# Patient Record
Sex: MALE | Race: WHITE | NOT HISPANIC OR LATINO | Employment: FULL TIME | URBAN - METROPOLITAN AREA
[De-identification: names, ages, dates, MRNs, and addresses within clinical notes are randomized per-mention and may not be internally consistent; named-entity substitution may affect disease eponyms.]

---

## 2017-03-24 ENCOUNTER — ANESTHESIA (OUTPATIENT)
Dept: SURGERY | Facility: HOSPITAL | Age: 52
End: 2017-03-24
Payer: COMMERCIAL

## 2017-03-24 ENCOUNTER — ANESTHESIA EVENT (OUTPATIENT)
Dept: SURGERY | Facility: HOSPITAL | Age: 52
End: 2017-03-24
Payer: COMMERCIAL

## 2017-03-24 ENCOUNTER — HOSPITAL ENCOUNTER (OUTPATIENT)
Facility: HOSPITAL | Age: 52
Discharge: HOME | End: 2017-03-24
Attending: ORTHOPAEDIC SURGERY | Admitting: ORTHOPAEDIC SURGERY
Payer: COMMERCIAL

## 2017-03-24 ENCOUNTER — SURGERY (OUTPATIENT)
Age: 52
End: 2017-03-24

## 2017-03-24 VITALS
SYSTOLIC BLOOD PRESSURE: 143 MMHG | OXYGEN SATURATION: 97 % | WEIGHT: 160 LBS | HEIGHT: 71 IN | DIASTOLIC BLOOD PRESSURE: 88 MMHG | TEMPERATURE: 98 F | BODY MASS INDEX: 22.4 KG/M2 | HEART RATE: 88 BPM | RESPIRATION RATE: 16 BRPM

## 2017-03-24 DIAGNOSIS — S62.102A LEFT WRIST FRACTURE: ICD-10-CM

## 2017-03-24 PROCEDURE — C1769 GUIDE WIRE: HCPCS | Performed by: ORTHOPAEDIC SURGERY

## 2017-03-24 PROCEDURE — 25000003 PHARM REV CODE 250: Performed by: NURSE ANESTHETIST, CERTIFIED REGISTERED

## 2017-03-24 PROCEDURE — C1713 ANCHOR/SCREW BN/BN,TIS/BN: HCPCS | Performed by: ORTHOPAEDIC SURGERY

## 2017-03-24 PROCEDURE — 71000015 HC POSTOP RECOV 1ST HR: Performed by: ORTHOPAEDIC SURGERY

## 2017-03-24 PROCEDURE — 36000709 HC OR TIME LEV III EA ADD 15 MIN: Performed by: ORTHOPAEDIC SURGERY

## 2017-03-24 PROCEDURE — 71000033 HC RECOVERY, INTIAL HOUR: Performed by: ORTHOPAEDIC SURGERY

## 2017-03-24 PROCEDURE — 25000003 PHARM REV CODE 250: Performed by: ANESTHESIOLOGY

## 2017-03-24 PROCEDURE — 27201423 OPTIME MED/SURG SUP & DEVICES STERILE SUPPLY: Performed by: ORTHOPAEDIC SURGERY

## 2017-03-24 PROCEDURE — 36000708 HC OR TIME LEV III 1ST 15 MIN: Performed by: ORTHOPAEDIC SURGERY

## 2017-03-24 PROCEDURE — 63600175 PHARM REV CODE 636 W HCPCS: Performed by: NURSE ANESTHETIST, CERTIFIED REGISTERED

## 2017-03-24 PROCEDURE — D9220A PRA ANESTHESIA: Mod: ANES,,, | Performed by: ANESTHESIOLOGY

## 2017-03-24 PROCEDURE — 37000009 HC ANESTHESIA EA ADD 15 MINS: Performed by: ORTHOPAEDIC SURGERY

## 2017-03-24 PROCEDURE — 99900104 DSU ONLY-NO CHARGE-EA ADD'L HR (STAT): Performed by: ORTHOPAEDIC SURGERY

## 2017-03-24 PROCEDURE — D9220A PRA ANESTHESIA: Mod: CRNA,,, | Performed by: NURSE ANESTHETIST, CERTIFIED REGISTERED

## 2017-03-24 PROCEDURE — 25000003 PHARM REV CODE 250

## 2017-03-24 PROCEDURE — 63600175 PHARM REV CODE 636 W HCPCS: Performed by: ORTHOPAEDIC SURGERY

## 2017-03-24 PROCEDURE — 99900103 DSU ONLY-NO CHARGE-INITIAL HR (STAT): Performed by: ORTHOPAEDIC SURGERY

## 2017-03-24 PROCEDURE — 71000039 HC RECOVERY, EACH ADD'L HOUR: Performed by: ORTHOPAEDIC SURGERY

## 2017-03-24 PROCEDURE — 63600175 PHARM REV CODE 636 W HCPCS: Performed by: ANESTHESIOLOGY

## 2017-03-24 PROCEDURE — 37000008 HC ANESTHESIA 1ST 15 MINUTES: Performed by: ORTHOPAEDIC SURGERY

## 2017-03-24 PROCEDURE — 25000003 PHARM REV CODE 250: Performed by: ORTHOPAEDIC SURGERY

## 2017-03-24 PROCEDURE — 27200651 HC AIRWAY, LMA: Performed by: NURSE ANESTHETIST, CERTIFIED REGISTERED

## 2017-03-24 DEVICE — SCREW LOCKING 2.4 X 20MM: Type: IMPLANTABLE DEVICE | Site: WRIST | Status: FUNCTIONAL

## 2017-03-24 DEVICE — IMPLANTABLE DEVICE: Type: IMPLANTABLE DEVICE | Site: WRIST | Status: FUNCTIONAL

## 2017-03-24 DEVICE — SCREW BONE LOCKING 2.4X24MM: Type: IMPLANTABLE DEVICE | Site: WRIST | Status: FUNCTIONAL

## 2017-03-24 DEVICE — SCREW LOCKING 2.4 X 14MM: Type: IMPLANTABLE DEVICE | Site: WRIST | Status: FUNCTIONAL

## 2017-03-24 DEVICE — SCREW CORTEX 2.7 X 16MM: Type: IMPLANTABLE DEVICE | Site: WRIST | Status: FUNCTIONAL

## 2017-03-24 DEVICE — SCREW STRDRV REC T8 2.4X24 SS: Type: IMPLANTABLE DEVICE | Site: WRIST | Status: FUNCTIONAL

## 2017-03-24 RX ORDER — MIDAZOLAM HYDROCHLORIDE 1 MG/ML
INJECTION, SOLUTION INTRAMUSCULAR; INTRAVENOUS
Status: DISCONTINUED | OUTPATIENT
Start: 2017-03-24 | End: 2017-03-24

## 2017-03-24 RX ORDER — CEFAZOLIN SODIUM 2 G/50ML
2 SOLUTION INTRAVENOUS
Status: COMPLETED | OUTPATIENT
Start: 2017-03-24 | End: 2017-03-24

## 2017-03-24 RX ORDER — KETAMINE HYDROCHLORIDE 100 MG/ML
INJECTION, SOLUTION INTRAMUSCULAR; INTRAVENOUS
Status: COMPLETED
Start: 2017-03-24 | End: 2017-03-24

## 2017-03-24 RX ORDER — HYDROCODONE BITARTRATE AND ACETAMINOPHEN 5; 325 MG/1; MG/1
1 TABLET ORAL EVERY 6 HOURS PRN
Status: ON HOLD | COMMUNITY
End: 2017-03-24 | Stop reason: HOSPADM

## 2017-03-24 RX ORDER — SODIUM CHLORIDE 0.9 % (FLUSH) 0.9 %
3 SYRINGE (ML) INJECTION
Status: DISCONTINUED | OUTPATIENT
Start: 2017-03-24 | End: 2017-03-24 | Stop reason: HOSPADM

## 2017-03-24 RX ORDER — PROPOFOL 10 MG/ML
VIAL (ML) INTRAVENOUS
Status: DISCONTINUED | OUTPATIENT
Start: 2017-03-24 | End: 2017-03-24

## 2017-03-24 RX ORDER — FENTANYL CITRATE 50 UG/ML
INJECTION, SOLUTION INTRAMUSCULAR; INTRAVENOUS
Status: DISCONTINUED | OUTPATIENT
Start: 2017-03-24 | End: 2017-03-24

## 2017-03-24 RX ORDER — FENTANYL CITRATE 50 UG/ML
25 INJECTION, SOLUTION INTRAMUSCULAR; INTRAVENOUS EVERY 5 MIN PRN
Status: COMPLETED | OUTPATIENT
Start: 2017-03-24 | End: 2017-03-24

## 2017-03-24 RX ORDER — KETOROLAC TROMETHAMINE 30 MG/ML
INJECTION, SOLUTION INTRAMUSCULAR; INTRAVENOUS
Status: DISCONTINUED | OUTPATIENT
Start: 2017-03-24 | End: 2017-03-24

## 2017-03-24 RX ORDER — SODIUM CHLORIDE, SODIUM LACTATE, POTASSIUM CHLORIDE, CALCIUM CHLORIDE 600; 310; 30; 20 MG/100ML; MG/100ML; MG/100ML; MG/100ML
INJECTION, SOLUTION INTRAVENOUS CONTINUOUS
Status: DISCONTINUED | OUTPATIENT
Start: 2017-03-24 | End: 2017-03-24 | Stop reason: HOSPADM

## 2017-03-24 RX ORDER — OXYCODONE AND ACETAMINOPHEN 5; 325 MG/1; MG/1
1-2 TABLET ORAL EVERY 6 HOURS PRN
Qty: 91 TABLET | Refills: 0 | Status: SHIPPED | OUTPATIENT
Start: 2017-03-24 | End: 2017-10-16

## 2017-03-24 RX ORDER — OXYCODONE AND ACETAMINOPHEN 5; 325 MG/1; MG/1
1 TABLET ORAL ONCE
Status: COMPLETED | OUTPATIENT
Start: 2017-03-24 | End: 2017-03-24

## 2017-03-24 RX ORDER — ONDANSETRON HYDROCHLORIDE 2 MG/ML
INJECTION, SOLUTION INTRAMUSCULAR; INTRAVENOUS
Status: DISCONTINUED | OUTPATIENT
Start: 2017-03-24 | End: 2017-03-24

## 2017-03-24 RX ORDER — LIDOCAINE HYDROCHLORIDE 10 MG/ML
1 INJECTION, SOLUTION EPIDURAL; INFILTRATION; INTRACAUDAL; PERINEURAL ONCE
Status: COMPLETED | OUTPATIENT
Start: 2017-03-24 | End: 2017-03-24

## 2017-03-24 RX ORDER — DEXAMETHASONE SODIUM PHOSPHATE 4 MG/ML
INJECTION, SOLUTION INTRA-ARTICULAR; INTRALESIONAL; INTRAMUSCULAR; INTRAVENOUS; SOFT TISSUE
Status: DISCONTINUED | OUTPATIENT
Start: 2017-03-24 | End: 2017-03-24

## 2017-03-24 RX ORDER — LIDOCAINE HCL/PF 100 MG/5ML
SYRINGE (ML) INTRAVENOUS
Status: DISCONTINUED | OUTPATIENT
Start: 2017-03-24 | End: 2017-03-24

## 2017-03-24 RX ORDER — CEFAZOLIN SODIUM 2 G/50ML
SOLUTION INTRAVENOUS
Status: DISCONTINUED
Start: 2017-03-24 | End: 2017-03-24 | Stop reason: HOSPADM

## 2017-03-24 RX ADMIN — MIDAZOLAM 2 MG: 1 INJECTION INTRAMUSCULAR; INTRAVENOUS at 02:03

## 2017-03-24 RX ADMIN — KETOROLAC TROMETHAMINE 30 MG: 30 INJECTION, SOLUTION INTRAMUSCULAR; INTRAVENOUS at 03:03

## 2017-03-24 RX ADMIN — SODIUM CHLORIDE, SODIUM LACTATE, POTASSIUM CHLORIDE, AND CALCIUM CHLORIDE: .6; .31; .03; .02 INJECTION, SOLUTION INTRAVENOUS at 02:03

## 2017-03-24 RX ADMIN — PROPOFOL 200 MG: 10 INJECTION, EMULSION INTRAVENOUS at 02:03

## 2017-03-24 RX ADMIN — OXYCODONE HYDROCHLORIDE AND ACETAMINOPHEN 1 TABLET: 5; 325 TABLET ORAL at 04:03

## 2017-03-24 RX ADMIN — KETAMINE HYDROCHLORIDE 25 MG: 100 INJECTION, SOLUTION, CONCENTRATE INTRAMUSCULAR; INTRAVENOUS at 02:03

## 2017-03-24 RX ADMIN — ONDANSETRON 4 MG: 2 INJECTION, SOLUTION INTRAMUSCULAR; INTRAVENOUS at 02:03

## 2017-03-24 RX ADMIN — FENTANYL CITRATE 100 MCG: 50 INJECTION INTRAMUSCULAR; INTRAVENOUS at 02:03

## 2017-03-24 RX ADMIN — FENTANYL CITRATE 25 MCG: 50 INJECTION INTRAMUSCULAR; INTRAVENOUS at 04:03

## 2017-03-24 RX ADMIN — FENTANYL CITRATE 50 MCG: 50 INJECTION INTRAMUSCULAR; INTRAVENOUS at 03:03

## 2017-03-24 RX ADMIN — LIDOCAINE HYDROCHLORIDE 10 MG: 10 INJECTION, SOLUTION EPIDURAL; INFILTRATION; INTRACAUDAL; PERINEURAL at 02:03

## 2017-03-24 RX ADMIN — DEXAMETHASONE SODIUM PHOSPHATE 4 MG: 4 INJECTION, SOLUTION INTRAMUSCULAR; INTRAVENOUS at 02:03

## 2017-03-24 RX ADMIN — LIDOCAINE HYDROCHLORIDE 100 MG: 20 INJECTION, SOLUTION INTRAVENOUS at 02:03

## 2017-03-24 RX ADMIN — CEFAZOLIN SODIUM 2 G: 2 SOLUTION INTRAVENOUS at 02:03

## 2017-03-24 NOTE — PLAN OF CARE
Informed Dr. Jiménez that patient has a rash in between fingers on left hand.  No new orders given.  Cassy Davis

## 2017-03-24 NOTE — ANESTHESIA PREPROCEDURE EVALUATION
03/24/2017  Anatoly Hatch III is a 51 y.o., male.    [facility] Anesthesia Evaluation    I have reviewed the Patient Summary Reports.    I have reviewed the Nursing Notes.   I have reviewed the Medications.     Review of Systems  Anesthesia Hx:  No previous Anesthesia  Denies Family Hx of Anesthesia complications.    Social:  Smoker, Alcohol Use    Hematology/Oncology:  Hematology Normal   Oncology Normal     EENT/Dental:EENT/Dental Normal   Cardiovascular:  Cardiovascular Normal     Pulmonary:  Pulmonary Normal Chronic smokers cough   Renal/:  Renal/ Normal     Hepatic/GI:  Hepatic/GI Normal    Neurological:  Neurology Normal    Endocrine:  Endocrine Normal        Physical Exam  General:  Well nourished    Airway/Jaw/Neck:  Airway Findings: Mouth Opening: Normal Tongue: Normal  General Airway Assessment: Adult  Mallampati: II  TM Distance: Normal, at least 6 cm       Chest/Lungs:  Chest/Lungs Findings: Rhonchi     Heart/Vascular:  Heart Findings: Rate: Normal  Rhythm: Regular Rhythm             Anesthesia Plan  Type of Anesthesia, risks & benefits discussed:  Anesthesia Type:  general  Patient's Preference:   Intra-op Monitoring Plan:   Intra-op Monitoring Plan Comments:   Post Op Pain Control Plan:   Post Op Pain Control Plan Comments:   Induction:   IV  Beta Blocker:  Patient is not currently on a Beta-Blocker (No further documentation required).       Informed Consent: Patient understands risks and agrees with Anesthesia plan.  Questions answered. Anesthesia consent signed with patient.  ASA Score: 2     Day of Surgery Review of History & Physical:    H&P update referred to the surgeon.         Ready For Surgery From Anesthesia Perspective.

## 2017-03-24 NOTE — OR NURSING
Informed Dr. Temple that patient drank coffee with cream and sugar at 0900 this morning, and that there isn't any current labs or EKG in chart.  No new orders given.  Cassy Davis

## 2017-03-24 NOTE — H&P
"Orthopaedic Surgery History and Physical    History of present illness:   Anatoly Hatch III is a 51 y.o. male who presents with LEFT wrist pain    Allergies:   Review of patient's allergies indicates:  No Known Allergies    Past medical history:   History reviewed. No pertinent past medical history.    Past surgical history:  History reviewed. No pertinent surgical history.    Social history:   Reviewed per EPIC history for tobacco or alcohol use     Medications:    Current Facility-Administered Medications:     cefazolin (ANCEF) 2 gram in dextrose 5% 50 mL IVPB (premix), 2 g, Intravenous, Once Pre-Op, Mika Jiémnez MD    lactated ringers infusion, , Intravenous, Continuous, Meir Temple MD, Last Rate: 10 mL/hr at 03/24/17 1422    sodium chloride 0.9% flush 3 mL, 3 mL, Intravenous, PRN, Meir Temple MD    Review of systems:  Denies chest pain, palpitations, shortness of breath.   Denies excessive thirst, urination or heat or cold intolerance.   Denies nausea, vomiting, melena or hematochezia.    Denies fever, chills, night sweats, weight loss.    Denies dysuria or hematuria.   Denies history of anxiety or depression.   Denies any skin abnormalities or rash.   Denies upper or lower extremity paresthesias or lightheadedness.    Denies cough, shortness of breath or hemoptysis.     Physical Exam:   Vitals:    03/24/17 0953 03/24/17 1425   BP:  (!) 143/89   BP Location:  Right arm   Patient Position:  Lying   BP Method:  Automatic   Pulse:  95   Resp:  18   Temp:  98.1 °F (36.7 °C)   TempSrc:  Oral   Weight: 72.6 kg (160 lb) 72.6 kg (160 lb)   Height: 5' 11" (1.803 m) 5' 11" (1.803 m)     No results for input(s): GLUCOSE, CALCIUM, PROT, NA, K, CO2, CL, BUN, CREATININE in the last 72 hours.    Invalid input(s):  ALBUMIN  No results for input(s): WBC, RBC, HGB, HCT, PLT in the last 72 hours.  No results for input(s): INR, APTT in the last 72 hours.    Invalid input(s): PT    Awake/alert/oriented " x3, No acute distress, Afebrile, Vital signs stable  Normocephalic, Atraumatic  Heart beating at normal rate  Good inspiratory effort with unlaboured breathing  Abdomen soft/nondistended/nontender    Imaging:  Radiographs demonstrate LEFT distal radius fracture    Assessment:   51 y.o. male with LEFT wrist fx      Plan:   To or    Mika Jiménez MD

## 2017-03-24 NOTE — PLAN OF CARE
Discharge instructions given to pt and girlfriend, who voice understanding.  Taking po fluids without nausea.  Pain 5/10 and tolerable per pt.  Dressing/soft cast to LUE, dry and intact

## 2017-03-24 NOTE — TRANSFER OF CARE
"Anesthesia Transfer of Care Note    Patient: Anatoly Hatch III    Procedure(s) Performed: Procedure(s) (LRB):  OPEN REDUCTION INTERNAL FIXATION-WRIST (Left)    Patient location: PACU    Anesthesia Type: general    Transport from OR: Transported from OR on 2-3 L/min O2 by NC with adequate spontaneous ventilation    Post pain: adequate analgesia    Post assessment: no apparent anesthetic complications and tolerated procedure well    Post vital signs: stable    Level of consciousness: awake, alert and oriented    Nausea/Vomiting: no nausea/vomiting    Complications: none          Last vitals:   Visit Vitals    BP (!) 143/89 (BP Location: Right arm, Patient Position: Lying, BP Method: Automatic)    Pulse 95    Temp 36.4 °C (97.6 °F) (Oral)    Resp 18    Ht 5' 11" (1.803 m)    Wt 72.6 kg (160 lb)    BMI 22.32 kg/m2     "

## 2017-03-24 NOTE — OP NOTE
Orthopaedic Surgery Operative Report      DATE OF PROCEDURE: 03/24/2017  PREOPERATIVE DIAGNOSIS: Unspecified fracture of the lower end of left radius, initial encounter for closed fracture [S52.502A]  POSTOPERATIVE DIAGNOSIS: Same.   PROCEDURE PERFORMED: ORIF of the LEFT distal radius  SURGEON: Surgeon(s) and Role:     * Mika Jiménez MD - Primary   ANESTHESIA: General endotracheal.   ESTIMATED BLOOD LOSS: 50cc.  IMPLANTS:     INDICATIONS FOR PROCEDURE: Anatoly Hatch III is a 51 y.o. male who sustained significant trauma to their LEFT wrist.This procedure, as well as, alternatives to this procedure was discussed at length with the patient. Risks and benefits were also discussed. Risks include but are not limited to bleeding, infection, numbness, scarring, damage to major neurovascular structures, limb length/rotation discrepancy, failure of hardware, need for further surgery, loss of function, myocardial infarction, deep venous thrombosis, pulmonary embolism, nonunion, malunion and death. Patient understood these well and consented for the procedure as described.     PROCEDURE IN DETAIL: The patient was identified in the preoperative holding area and site was marked. The patient was wheeled into the Operating Room and general endotracheal anesthesia was induced in the patient's hospital bed. The patient was then moved over to the operative table and placed into a supine position. A well-padded tourniquet was placed on the biceps. The operative extremity were then prepped and draped in the usual sterile fashion. A timeout was taken to confirm the patient, site, surgery, surgeon and administration of preoperative antibiotics. All agreed and we proceeded. Using the volar stephany approach the skin was incised.  Neurovascular structures were protected the.  Deep fascia was incised and the FPL was reflected ulnarly.  The PQ was incised and taken off the bone with a elevator.  A provision reduction with  k-wire fixation was done and verified with fluoro.  An appropriate sized plate was placed on the volar surface proximal to the watershed.  This was then transfixed via cortical fixation both proximally and distally. The remaining opportunities were drilled and filled with locking screws.  A live fluoro investigation showed appropriate lengths of all screws.  Final xrays were taken and saved.  The wound was irrigated with copious normal saline and the wound closed in the normal fashion.  Sterile dressings were applied and the limb was placed in a sugar tong splint.  Patient tolerated the procedure without complication and was wheeled into the PACU in stable condition.      PLAN FOR THE PATIENT: Remain [facility] of the [facility] in splint.  Return to clinic in 2 weeks for removal of sutures and evaluation of post-operative plan    Mika Jiménez M.D.  [facility]

## 2017-03-24 NOTE — DISCHARGE SUMMARY
[facility]  Discharge Summary      Patient Name: Anatoly Hatch III  MRN: [id number]  Admission Date: 3/24/2017  Hospital Length of Stay: 0 days  Discharge Date and Time:  03/24/2017 4:03 PM  Attending Physician: Mika Jiménez, *   Discharging Provider: Mika Jiménez MD  Primary Care Provider: Primary Doctor No     Procedure(s) (LRB):  OPEN REDUCTION INTERNAL FIXATION-WRIST (Left)     Hospital Course: Pt tolerated outpatient procedure without complication    Pending Diagnostic Studies:     Procedure Component Value Units Date/Time    FL Greater Than 1 Hour [[id number]] Resulted:  03/24/17 1420    Order Status:  Sent Lab Status:  In process Updated:  03/24/17 1420    X-Ray Wrist 2 View Left [[id number]] Resulted:  03/24/17 1420    Order Status:  Sent Lab Status:  In process Updated:  03/24/17 1420        Final Active Diagnoses:    Diagnosis Date Noted POA    PRINCIPAL PROBLEM:  Left wrist fracture [S62.102A] 03/24/2017 Yes      Problems Resolved During this Admission:    Diagnosis Date Noted Date Resolved POA      Discharged Condition: stable    Disposition: Home or Self Care    Follow Up:  Follow-up Information     Follow up with Mika Jiménez MD In 2 weeks.    Specialty:  Orthopedic Surgery    Why:  For wound re-check, cast placement    Contact information:    [address] [place] [id number] [phone]          Patient Instructions:     Diet general     Weight bearing restrictions (specify)   Order Comments: NON-weight bearing LUE in splint/sling     Call MD for:  persistent nausea and vomiting or diarrhea     Call MD for:  temperature >100.4     Call MD for:  severe uncontrolled pain     Call MD for:  redness, tenderness, or signs of infection (pain, swelling, redness, odor or green/yellow discharge around incision site)     Call MD for:  difficulty breathing or increased cough       Medications:  Reconciled Home Medications:   Current Discharge Medication  List      START taking these medications    Details   oxycodone-acetaminophen (PERCOCET) 5-325 mg per tablet Take 1-2 tablets by mouth every 6 (six) hours as needed for Pain.  Qty: 91 tablet, Refills: 0         STOP taking these medications       hydrocodone-acetaminophen 5-325mg (NORCO) 5-325 mg per tablet Comments:   Reason for Stopping:               Mika Jiménez MD  [facility]

## 2017-03-24 NOTE — IP AVS SNAPSHOT
[address] [place] [address]  Phone: [phone]           Patient Discharge Instructions     Our goal is to set you up for success. This packet includes information on your condition, medications, and your home care. It will help you to care for yourself so you don't get sicker and need to go back to the hospital.     Please ask your nurse if you have any questions.        There are many details to remember when preparing to leave the hospital. Here is what you will need to do:    1. Take your medicine. If you are prescribed medications, review your Medication List in the following pages. You may have new medications to  at the pharmacy and others that you'll need to stop taking. Review the instructions for how and when to take your medications. Talk with your doctor or nurses if you are unsure of what to do.     2. Go to your follow-up appointments. Specific follow-up information is listed in the following pages. Your may be contacted by a transition nurse or clinical provider about future appointments. Be sure we have all of the phone numbers to reach you, if needed. Please contact your provider's office if you are unable to make an appointment.     3. Watch for warning signs. Your doctor or nurse will give you detailed warning signs to watch for and when to call for assistance. These instructions may also include educational information about your condition. If you experience any of warning signs to your health, call your doctor.               ** Verify the list of medication(s) below is accurate and up to date. Carry this with you in case of emergency. If your medications have changed, please notify your healthcare provider.             Medication List      START taking these medications        Additional Info                      oxycodone-acetaminophen 5-325 mg per tablet   Commonly known as:  PERCOCET   Quantity:  91 tablet   Refills:  0   Dose:  1-2  tablet    Last time this was given:  1 tablet on 3/24/2017  4:25 PM   Instructions:  Take 1-2 tablets by mouth every 6 (six) hours as needed for Pain.     Begin Date    AM    Noon    PM    Bedtime         STOP taking these medications     hydrocodone-acetaminophen 5-325mg 5-325 mg per tablet   Commonly known as:  NORCO            Where to Get Your Medications      You can get these medications from any pharmacy     Bring a paper prescription for each of these medications     oxycodone-acetaminophen 5-325 mg per tablet                  Please bring to all follow up appointments:    1. A copy of your discharge instructions.  2. All medicines you are currently taking in their original bottles.  3. Identification and insurance card.    Please arrive 15 minutes ahead of scheduled appointment time.    Please call 24 hours in advance if you must reschedule your appointment and/or time.        Follow-up Information     Follow up with Mika Jiménez MD In 2 weeks.    Specialty:  Orthopedic Surgery    Why:  For wound re-check, cast placement    Contact information:    [address] [place] [address] [id number] [phone]          Discharge Instructions     Future Orders    Call MD for:  difficulty breathing or increased cough     Call MD for:  persistent nausea and vomiting or diarrhea     Call MD for:  redness, tenderness, or signs of infection (pain, swelling, redness, odor or green/yellow discharge around incision site)     Call MD for:  severe uncontrolled pain     Call MD for:  temperature >100.4     Diet general     Questions:    Total calories:      Fat restriction, if any:      Protein restriction, if any:      Na restriction, if any:      Fluid restriction:      Additional restrictions:      Weight bearing restrictions (specify)     Comments:    NON-weight bearing LUE in splint/sling        Discharge Instructions         Wrist Fracture, General  You have a broken bone (fracture) in your wrist. This may be a small  crack or chip in the bone. Or it may be a major break, with the broken parts pushed out of position. Wrist fractures are treated with a splint or cast. They take about 4 to 6 weeks to heal. Severe injuries may need surgery.    Home care  Follow these guidelines when caring for yourself at home:  · Keep your arm elevated to reduce pain and swelling. When sitting or lying down keep your arm above the level of your heart. You can do this by placing your arm on a pillow that rests on your chest or on a pillow at your side. This is most important during the first 2 days (48 hours) after the injury.  · Put an ice pack on the injured area. Do this for 20 minutes every 1 to 2 hours the first day for pain relief. You can make an ice pack by wrapping a plastic bag of ice cubes in a thin towel. As the ice melts, be careful that the cast or splint doesnt get wet. Continue using the ice pack 3 to 4 times a day for the next 2 days. Then use the ice pack as needed to ease pain and swelling.  · Keep the cast or splint completely dry at all times. Bathe with your cast or splint out of the water. Protect it with a large plastic bag, rubber-banded at the top end. If a fiberglass cast or splint gets wet, you can dry it with a hair dryer.  · You may use acetaminophen or ibuprofen to control pain, unless another pain medicine was prescribed. If you have chronic liver or kidney disease, talk with your health care provider before using these medicines. Also talk with your provider if youve had a stomach ulcer or GI bleeding.  · Dont put creams or objects under the cast if you have itching.  Follow-up care  Follow up with your health care provider in 1 week, or as advised. This is to make sure the bone is healing the way it should. If a splint was put on, it will be changed to a cast during your follow-up visit. A cast may need to be changed at 2 to 3 weeks, as the swelling goes down.  If X-rays were taken, a radiologist will look at  them. You will be told of any new findings that may affect your care.  When to seek medical advice  Call your health care provider right away if any of these occur:  · The plaster cast or splint becomes wet or soft  · The cast cracks  · Bad odor from the cast or wound fluid stains the cast  · The fiberglass cast or splint stays wet for more than 24 hours  · Tightness or pain under the cast or splint gets worse  · Fingers become swollen, cold, blue, numb, or tingly  · You cant move your fingers  · Skin around cast becomes red  Date Last Reviewed: 2/16/2015  © 6642-2609 [employer]. [address] [place] [address], [place] [address]. All rights reserved. This information is not intended as a substitute for professional medical care. Always follow your healthcare professional's instructions.      General Information:    1.  Do not drink alcoholic beverages including beer for 24 hours or as long as you are on pain medication..  2.  Do not drive a motor vehicle, operate machinery or power tools, or signs legal papers for 24 hours or as long as you are on pain medication.   3.  You may experience light-headedness, dizziness, and sleepiness following surgery. Please do not stay alone. A responsible adult should be with you for this 24 hour period.  4.  Go home and rest.    5. Progress slowly to a normal diet unless instructed.  Otherwise, begin with liquids such as soft drinks, then soup and crackers working up to solid foods. Drink plenty of nonalcoholic fluids.  6.  Certain anesthetics and pain medications produce nausea and vomiting in certain       individuals. If nausea becomes a problem at home, call you doctor.    7. A nurse will be calling you sometime after surgery. Do not be alarmed. This is our way of finding out how you are doing.    8. Several times every hour while you are awake, take 2-3 deep breaths and cough. If you had stomach surgery hold a pillow or rolled towel firmly against your stomach before  "you cough. This will help with any pain the cough might cause.  9. Several times every hour while you are awake, pump and flex your feet 5-6 times and do foot circles. This will help prevent blood clots.    10.Call your doctor for severe pain, bleeding, fever, or signs or symptoms of infection (pain, swelling, redness, foul odor, drainage).Discharge Instructions: After Your Surgery/Procedure  Youve just had surgery. During surgery you were given medicine called anesthesia to keep you relaxed and free of pain. After surgery you may have some pain or nausea. This is common. Here are some tips for feeling better and getting well after surgery.     Stay on schedule with your medication.   Going home  Your doctor or nurse will show you how to take care of yourself when you go home. He or she will also answer your questions. Have an adult family member or friend drive you home.      For your safety we recommend these precaution for the first 24 hours after your procedure:  · Do not drive or use heavy equipment.  · Do not make important decisions or sign legal papers.  · Do not drink alcohol.  · Have someone stay with you, if needed. He or she can watch for problems and help keep you safe.  · Your concentration, balance, coordination, and judgement may be impaired for many hours after anesthesia.  Use caution when ambulating or standing up.     · You may feel weak and "washed out" after anesthesia and surgery.      Subtle residual effects of general anesthesia or sedation with regional / local anesthesia can last more than 24 hours.  Rest for the remainder of the day or longer if your Doctor/Surgeon has advised you to do so.  Although you may feel normal within the first 24 hours, your reflexes and mental ability may be impaired without you realizing it.  You may feel dizzy, lightheaded or sleepy for 24 hours or longer.      Be sure to go to all follow-up visits with your doctor. And rest after your surgery for as long as " your doctor tells you to.  Coping with pain  If you have pain after surgery, pain medicine will help you feel better. Take it as told, before pain becomes severe. Also, ask your doctor or pharmacist about other ways to control pain. This might be with heat, ice, or relaxation. And follow any other instructions your surgeon or nurse gives you.  Tips for taking pain medicine  To get the best relief possible, remember these points:  · Pain medicines can upset your stomach. Taking them with a little food may help.  · Most pain relievers taken by mouth need at least 20 to 30 minutes to start to work.  · Taking medicine on a schedule can help you remember to take it. Try to time your medicine so that you can take it before starting an activity. This might be before you get dressed, go for a walk, or sit down for dinner.  · Constipation is a common side effect of pain medicines. Call your doctor before taking any medicines such as laxatives or stool softeners to help ease constipation. Also ask if you should skip any foods. Drinking lots of fluids and eating foods such as fruits and vegetables that are high in fiber can also help. Remember, do not take laxatives unless your surgeon has prescribed them.  · Drinking alcohol and taking pain medicine can cause dizziness and slow your breathing. It can even be deadly. Do not drink alcohol while taking pain medicine.  · Pain medicine can make you react more slowly to things. Do not drive or run machinery while taking pain medicine.  Your health care provider may tell you to take acetaminophen to help ease your pain. Ask him or her how much you are supposed to take each day. Acetaminophen or other pain relievers may interact with your prescription medicines or other over-the-counter (OTC) drugs. Some prescription medicines have acetaminophen and other ingredients. Using both prescription and OTC acetaminophen for pain can cause you to overdose. Read the labels on your OTC  medicines with care. This will help you to clearly know the list of ingredients, how much to take, and any warnings. It may also help you not take too much acetaminophen. If you have questions or do not understand the information, ask your pharmacist or health care provider to explain it to you before you take the OTC medicine.  Managing nausea  Some people have an upset stomach after surgery. This is often because of anesthesia, pain, or pain medicine, or the stress of surgery. These tips will help you handle nausea and eat healthy foods as you get better. If you were on a special food plan before surgery, ask your doctor if you should follow it while you get better. These tips may help:  · Do not push yourself to eat. Your body will tell you when to eat and how much.  · Start off with clear liquids and soup. They are easier to digest.  · Next try semi-solid foods, such as mashed potatoes, applesauce, and gelatin, as you feel ready.  · Slowly move to solid foods. Dont eat fatty, rich, or spicy foods at first.  · Do not force yourself to have 3 large meals a day. Instead eat smaller amounts more often.  · Take pain medicines with a small amount of solid food, such as crackers or toast, to avoid nausea.     Call your surgeon if  · You still have pain an hour after taking medicine. The medicine may not be strong enough.  · You feel too sleepy, dizzy, or groggy. The medicine may be too strong.  · You have side effects like nausea, vomiting, or skin changes, such as rash, itching, or hives.       If you have obstructive sleep apnea  You were given anesthesia medicine during surgery to keep you comfortable and free of pain. After surgery, you may have more apnea spells because of this medicine and other medicines you were given. The spells may last longer than usual.   At home:  · Keep using the continuous positive airway pressure (CPAP) device when you sleep. Unless your health care provider tells you not to, use it  "when you sleep, day or night. CPAP is a common device used to treat obstructive sleep apnea.  · Talk with your provider before taking any pain medicine, muscle relaxants, or sedatives. Your provider will tell you about the possible dangers of taking these medicines.  © 8839-3100 [employer]. [address] [place] [address]. All rights reserved. This information is not intended as a substitute for professional medical care. Always follow your healthcare professional's instructions.        Primary Diagnosis     Your primary diagnosis was:  Broken Wrist      Admission Information     Date & Time Provider Department CSN    3/24/2017  2:12 PM Mika Jiménez MD [facility] [id number]      Care Providers     Provider Role Specialty Primary office phone    Mika Jiménez MD Attending Provider Orthopedic Surgery [phone]    Mika Jiménez MD Surgeon  Orthopedic Surgery [phone]      Your Vitals Were     BP Pulse Temp Resp Height Weight    155/95 92 98.1 °F (36.7 °C) (Oral) 14 5' 11" (1.803 m) 72.6 kg (160 lb)    SpO2 BMI             97% 22.32 kg/m2         Recent Lab Values     No lab values to display.      Allergies as of 3/24/2017     No Known Allergies      [facility]     [facility] Nurse Care Line - 24/7 Assistance  Unless otherwise directed by your provider, please contact [facility], our nurse care line that is available for 24/7 assistance.     Registered nurses in the [facility] provide clinical advisement, health education, appointment booking, and other advisory services.  Call for this free service at [phone].        Advance Directives     An advance directive is a document which, in the event you are no longer able to make decisions for yourself, tells your healthcare team what kind of treatment you do or do not want to receive, or who you would like to make those decisions for you.  If you do not " currently have an advance directive, [facility] encourages you to create one.  For more information call:  [phone]), [phone] ([phone]),  or log on to [email or web address].        Smoking Cessation     If you would like to quit smoking:   You may be eligible for free services if you are a [place] resident and started smoking cigarettes before September 1, 1988.  Call the Smoking Cessation [employer] (SCT) toll free at (517) 936-8096 or (203) 552-5468.   Call 8-896-QUIT-NOW if you do not meet the above criteria.            Language Assistance Services     ATTENTION: Language assistance services are available, free of charge. Please call [phone].      ATENCIÓN: Si habla español, tiene a gonzalez disposición servicios gratuitos de asistencia lingüística. Llame al [phone].     CHÚ Ý: N?u b?n nói Ti?ng Vi?t, có các d?ch v? h? tr? ngôn ng? mi?n phí dành cho b?n. G?i s? [phone].        [facility] Sign-Up     Activating your [facility] account is as easy as 1-2-3!     1) Visit [email or web address], select Sign Up Now, enter this activation code and your date of birth, then select Next.  [email or web address]  Expires: 5/8/2017  5:04 PM      2) Create a username and password to use when you visit [facility] in the future and select a security question in case you lose your password and select Next.    3) Enter your e-mail address and click Sign Up!    Additional Information  If you have questions, please e-mail [email or web address] or call [phone] to talk to our [facility] staff. Remember, [facility] is NOT to be used for urgent needs. For medical emergencies, dial 911.          [facility] complies with applicable Federal civil rights laws and does not discriminate on the basis of race, color, national origin, age, disability, or sex.

## 2017-03-24 NOTE — DISCHARGE INSTRUCTIONS
Wrist Fracture, General  You have a broken bone (fracture) in your wrist. This may be a small crack or chip in the bone. Or it may be a major break, with the broken parts pushed out of position. Wrist fractures are treated with a splint or cast. They take about 4 to 6 weeks to heal. Severe injuries may need surgery.    Home care  Follow these guidelines when caring for yourself at home:  · Keep your arm elevated to reduce pain and swelling. When sitting or lying down keep your arm above the level of your heart. You can do this by placing your arm on a pillow that rests on your chest or on a pillow at your side. This is most important during the first 2 days (48 hours) after the injury.  · Put an ice pack on the injured area. Do this for 20 minutes every 1 to 2 hours the first day for pain relief. You can make an ice pack by wrapping a plastic bag of ice cubes in a thin towel. As the ice melts, be careful that the cast or splint doesnt get wet. Continue using the ice pack 3 to 4 times a day for the next 2 days. Then use the ice pack as needed to ease pain and swelling.  · Keep the cast or splint completely dry at all times. Bathe with your cast or splint out of the water. Protect it with a large plastic bag, rubber-banded at the top end. If a fiberglass cast or splint gets wet, you can dry it with a hair dryer.  · You may use acetaminophen or ibuprofen to control pain, unless another pain medicine was prescribed. If you have chronic liver or kidney disease, talk with your health care provider before using these medicines. Also talk with your provider if youve had a stomach ulcer or GI bleeding.  · Dont put creams or objects under the cast if you have itching.  Follow-up care  Follow up with your health care provider in 1 week, or as advised. This is to make sure the bone is healing the way it should. If a splint was put on, it will be changed to a cast during your follow-up visit. A cast may need to be changed  at 2 to 3 weeks, as the swelling goes down.  If X-rays were taken, a radiologist will look at them. You will be told of any new findings that may affect your care.  When to seek medical advice  Call your health care provider right away if any of these occur:  · The plaster cast or splint becomes wet or soft  · The cast cracks  · Bad odor from the cast or wound fluid stains the cast  · The fiberglass cast or splint stays wet for more than 24 hours  · Tightness or pain under the cast or splint gets worse  · Fingers become swollen, cold, blue, numb, or tingly  · You cant move your fingers  · Skin around cast becomes red  Date Last Reviewed: 2/16/2015  © 3387-2737 [employer]. [address] [place] [address], [place] [address]. All rights reserved. This information is not intended as a substitute for professional medical care. Always follow your healthcare professional's instructions.      General Information:    1.  Do not drink alcoholic beverages including beer for 24 hours or as long as you are on pain medication..  2.  Do not drive a motor vehicle, operate machinery or power tools, or signs legal papers for 24 hours or as long as you are on pain medication.   3.  You may experience light-headedness, dizziness, and sleepiness following surgery. Please do not stay alone. A responsible adult should be with you for this 24 hour period.  4.  Go home and rest.    5. Progress slowly to a normal diet unless instructed.  Otherwise, begin with liquids such as soft drinks, then soup and crackers working up to solid foods. Drink plenty of nonalcoholic fluids.  6.  Certain anesthetics and pain medications produce nausea and vomiting in certain       individuals. If nausea becomes a problem at home, call you doctor.    7. A nurse will be calling you sometime after surgery. Do not be alarmed. This is our way of finding out how you are doing.    8. Several times every hour while you are awake, take 2-3 deep breaths and  "cough. If you had stomach surgery hold a pillow or rolled towel firmly against your stomach before you cough. This will help with any pain the cough might cause.  9. Several times every hour while you are awake, pump and flex your feet 5-6 times and do foot circles. This will help prevent blood clots.    10.Call your doctor for severe pain, bleeding, fever, or signs or symptoms of infection (pain, swelling, redness, foul odor, drainage).Discharge Instructions: After Your Surgery/Procedure  Youve just had surgery. During surgery you were given medicine called anesthesia to keep you relaxed and free of pain. After surgery you may have some pain or nausea. This is common. Here are some tips for feeling better and getting well after surgery.     Stay on schedule with your medication.   Going home  Your doctor or nurse will show you how to take care of yourself when you go home. He or she will also answer your questions. Have an adult family member or friend drive you home.      For your safety we recommend these precaution for the first 24 hours after your procedure:  · Do not drive or use heavy equipment.  · Do not make important decisions or sign legal papers.  · Do not drink alcohol.  · Have someone stay with you, if needed. He or she can watch for problems and help keep you safe.  · Your concentration, balance, coordination, and judgement may be impaired for many hours after anesthesia.  Use caution when ambulating or standing up.     · You may feel weak and "washed out" after anesthesia and surgery.      Subtle residual effects of general anesthesia or sedation with regional / local anesthesia can last more than 24 hours.  Rest for the remainder of the day or longer if your Doctor/Surgeon has advised you to do so.  Although you may feel normal within the first 24 hours, your reflexes and mental ability may be impaired without you realizing it.  You may feel dizzy, lightheaded or sleepy for 24 hours or longer.  "     Be sure to go to all follow-up visits with your doctor. And rest after your surgery for as long as your doctor tells you to.  Coping with pain  If you have pain after surgery, pain medicine will help you feel better. Take it as told, before pain becomes severe. Also, ask your doctor or pharmacist about other ways to control pain. This might be with heat, ice, or relaxation. And follow any other instructions your surgeon or nurse gives you.  Tips for taking pain medicine  To get the best relief possible, remember these points:  · Pain medicines can upset your stomach. Taking them with a little food may help.  · Most pain relievers taken by mouth need at least 20 to 30 minutes to start to work.  · Taking medicine on a schedule can help you remember to take it. Try to time your medicine so that you can take it before starting an activity. This might be before you get dressed, go for a walk, or sit down for dinner.  · Constipation is a common side effect of pain medicines. Call your doctor before taking any medicines such as laxatives or stool softeners to help ease constipation. Also ask if you should skip any foods. Drinking lots of fluids and eating foods such as fruits and vegetables that are high in fiber can also help. Remember, do not take laxatives unless your surgeon has prescribed them.  · Drinking alcohol and taking pain medicine can cause dizziness and slow your breathing. It can even be deadly. Do not drink alcohol while taking pain medicine.  · Pain medicine can make you react more slowly to things. Do not drive or run machinery while taking pain medicine.  Your health care provider may tell you to take acetaminophen to help ease your pain. Ask him or her how much you are supposed to take each day. Acetaminophen or other pain relievers may interact with your prescription medicines or other over-the-counter (OTC) drugs. Some prescription medicines have acetaminophen and other ingredients. Using both  prescription and OTC acetaminophen for pain can cause you to overdose. Read the labels on your OTC medicines with care. This will help you to clearly know the list of ingredients, how much to take, and any warnings. It may also help you not take too much acetaminophen. If you have questions or do not understand the information, ask your pharmacist or health care provider to explain it to you before you take the OTC medicine.  Managing nausea  Some people have an upset stomach after surgery. This is often because of anesthesia, pain, or pain medicine, or the stress of surgery. These tips will help you handle nausea and eat healthy foods as you get better. If you were on a special food plan before surgery, ask your doctor if you should follow it while you get better. These tips may help:  · Do not push yourself to eat. Your body will tell you when to eat and how much.  · Start off with clear liquids and soup. They are easier to digest.  · Next try semi-solid foods, such as mashed potatoes, applesauce, and gelatin, as you feel ready.  · Slowly move to solid foods. Dont eat fatty, rich, or spicy foods at first.  · Do not force yourself to have 3 large meals a day. Instead eat smaller amounts more often.  · Take pain medicines with a small amount of solid food, such as crackers or toast, to avoid nausea.     Call your surgeon if  · You still have pain an hour after taking medicine. The medicine may not be strong enough.  · You feel too sleepy, dizzy, or groggy. The medicine may be too strong.  · You have side effects like nausea, vomiting, or skin changes, such as rash, itching, or hives.       If you have obstructive sleep apnea  You were given anesthesia medicine during surgery to keep you comfortable and free of pain. After surgery, you may have more apnea spells because of this medicine and other medicines you were given. The spells may last longer than usual.   At home:  · Keep using the continuous positive airway  pressure (CPAP) device when you sleep. Unless your health care provider tells you not to, use it when you sleep, day or night. CPAP is a common device used to treat obstructive sleep apnea.  · Talk with your provider before taking any pain medicine, muscle relaxants, or sedatives. Your provider will tell you about the possible dangers of taking these medicines.  © 2741-6884 [employer]. [address] [place] [address], [place] [address]. All rights reserved. This information is not intended as a substitute for professional medical care. Always follow your healthcare professional's instructions.

## 2017-03-25 NOTE — ANESTHESIA POSTPROCEDURE EVALUATION
"Anesthesia Post Evaluation    Patient: Anatoly Hatch III    Procedure(s) Performed: Procedure(s) (LRB):  OPEN REDUCTION INTERNAL FIXATION-WRIST (Left)    Final Anesthesia Type: general  Patient location during evaluation: PACU  Patient participation: Yes- Able to Participate  Level of consciousness: awake and alert  Post-procedure vital signs: reviewed and stable  Pain management: adequate  Airway patency: patent  PONV status at discharge: No PONV  Anesthetic complications: no      Cardiovascular status: blood pressure returned to baseline  Respiratory status: unassisted  Hydration status: euvolemic  Follow-up not needed.        Visit Vitals    BP (!) 143/88    Pulse 88    Temp 36.7 °C (98.1 °F) (Oral)    Resp 16    Ht 5' 11" (1.803 m)    Wt 72.6 kg (160 lb)    SpO2 97%    BMI 22.32 kg/m2       Pain/Andrez Score: Pain Assessment Performed: Yes (3/24/2017  5:45 PM)  Presence of Pain: complains of pain/discomfort (3/24/2017  5:45 PM)  Pain Rating Prior to Med Admin: 6 (3/24/2017  5:10 PM)  Andrez Score: 10 (3/24/2017  5:45 PM)      "

## 2017-09-25 ENCOUNTER — OFFICE VISIT (OUTPATIENT)
Dept: UROLOGY | Facility: CLINIC | Age: 52
End: 2017-09-25
Payer: COMMERCIAL

## 2017-09-25 ENCOUNTER — HOSPITAL ENCOUNTER (OUTPATIENT)
Dept: RADIOLOGY | Facility: HOSPITAL | Age: 52
Discharge: HOME | End: 2017-09-25
Attending: NURSE PRACTITIONER
Payer: COMMERCIAL

## 2017-09-25 VITALS
RESPIRATION RATE: 16 BRPM | BODY MASS INDEX: 20.68 KG/M2 | HEART RATE: 100 BPM | DIASTOLIC BLOOD PRESSURE: 82 MMHG | HEIGHT: 71 IN | WEIGHT: 147.69 LBS | SYSTOLIC BLOOD PRESSURE: 120 MMHG

## 2017-09-25 DIAGNOSIS — N28.89 LEFT RENAL MASS: ICD-10-CM

## 2017-09-25 DIAGNOSIS — N28.89 LEFT RENAL MASS: Primary | ICD-10-CM

## 2017-09-25 PROCEDURE — 99204 OFFICE O/P NEW MOD 45 MIN: CPT | Mod: S$GLB,,, | Performed by: NURSE PRACTITIONER

## 2017-09-25 PROCEDURE — 74178 CT ABD&PLV WO CNTR FLWD CNTR: CPT | Mod: TC

## 2017-09-25 PROCEDURE — 3008F BODY MASS INDEX DOCD: CPT | Mod: S$GLB,,, | Performed by: NURSE PRACTITIONER

## 2017-09-25 PROCEDURE — 25500020 PHARM REV CODE 255

## 2017-09-25 PROCEDURE — 99999 PR PBB SHADOW E&M-EST. PATIENT-LVL III: CPT | Mod: PBBFAC,,, | Performed by: NURSE PRACTITIONER

## 2017-09-25 PROCEDURE — 74178 CT ABD&PLV WO CNTR FLWD CNTR: CPT | Mod: 26,,, | Performed by: RADIOLOGY

## 2017-09-25 RX ADMIN — IOHEXOL 75 ML: 350 INJECTION, SOLUTION INTRAVENOUS at 02:09

## 2017-09-25 NOTE — PROGRESS NOTES
[facility] Note  Staff: BERNIE Denton    Referring provider and please cc:  PCP: None on File    Chief Complaint: [facility] follow-up: Left renal mass    Subjective:        HPI: Anatoly Hatch III is a 51 y.o. male new patient presents today to Urology clinic for follow-up from recent hospital visit to [facility].  Pt went to [facility] on 09/21/17 with c/o acute lower abdominal pain which was related to found ruptured appendix and required an emergency appendectomy.  Upon reviewing imaging that ER MD ordered the following was found on imaging results that needed further Urology evaluation:    Abdomen and Pelvis with contrast CT at [facility]:  IMPRESSION:  Indeterminate left renal cortical hypodensity.  Possibility exists that this could be related to a calyceal diverticulum.  Solid renal neoplasm such as renal cell carcinoma cannot be excluded on basis of this exam alone.      Renal Ultrasound done on 09/22/17 at [facility]:  IMPRESSION:  1.  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  2.  Normal appearing right kidney and bladder.    BUN/Cr done at [facility] on 09/21/17: 7/0.72    *Today pt still recovering from recent appendectomy, was discharged from hospital over the weekend.  He is still doing clear liquid diet due to nausea/vomiting issues and having hard time keeping solids down.  He denies any left flank pain, or problems with his normal urinary habits.  He denies dysuria, hematuria, or incomplete bladder emptying.    REVIEW OF SYSTEMS:  Review of Systems   Constitutional: Negative for chills, diaphoresis, fever and weight loss.   HENT: Negative for congestion, hearing loss, nosebleeds and sore throat.    Eyes: Negative for blurred  vision and pain.   Respiratory: Negative for cough and wheezing.    Cardiovascular: Negative for chest pain, palpitations and leg swelling.   Gastrointestinal: Negative for abdominal pain, heartburn, nausea and vomiting.   Genitourinary: Negative for dysuria, flank pain, frequency, hematuria and urgency.        Left renal mass   Musculoskeletal: Negative for back pain, joint pain, myalgias and neck pain.   Skin: Negative for itching and rash.   Neurological: Negative for dizziness, tremors, sensory change, seizures, loss of consciousness, weakness and headaches.   Endo/Heme/Allergies: Does not bruise/bleed easily.   Psychiatric/Behavioral: Negative for depression and suicidal ideas. The patient is not nervous/anxious.      Physical Exam    PMHx:  History reviewed. No pertinent past medical history.  Kidney stones: No  Cataracts? None    PSHx:  Past Surgical History:   Procedure Laterality Date    APPENDECTOMY  09/21/2017    done at [facility] by Dr. Vasquez     Stents/Valves/Foreign Bodies: No  Cardiac Evaluation: No    Screening Studies  Colonoscopy: Never had one done    Fam Hx:   malignancies: No    kidney stones: No     Soc Hx:  +tobacco, 1ppd x 30 years  +alcohol-4 whiskey drinks daily  Lives in [place]  Single  Occupation: Commercial     Allergies:  Review of patient's allergies indicates no known allergies.    Medications: reviewed   Anticoagulation: No    Objective:     Vitals:    09/25/17 1042   BP: 120/82   Pulse: 100   Resp: 16     General:WDWN in NAD  Eyes: PERRLA, normal conjunctiva  Respiratory: no increased work on breathing, clear to auscultation  Cardiovascular: regular rate and rhythm. No obvious extremity edema.  GI: palpation of masses. No tenderness. No hepatosplenomegaly to palpation.  Musculoskeletal: normal range of motion of bilateral upper extremities. Normal muscle strength and tone.  Skin: no obvious rashes or lesions. No tightening of skin noted.  Neurologic: CN grossly  normal. Normal sensation.   Psychiatric: awake, alert and oriented x 3. Mood and affect normal. Cooperative.    LABS REVIEW:  UA today: Pt was unable to give urine specimen    Assessment:       1. Left renal mass          Plan:     Consulted and discussed with one of Urologists-Dr. Antoine Estrada today in reference to imaging and findings with patient.  Imaging from [facility] reviewed with Dr. Antoine Estrada which we both agree the following needs to be done for further evaluation r/t the left renal mass:    -CT Urogram with and without contrast  -Serum Cr to be done prior to study.    Findings on imaging explained to the patient today and explained to him the need for additional imaging.  Pt verbalized understanding.     F/u with Dr. Estrada after CT Urogram and lab performed.    [facility]: Pending    JEREMIAH FlowersC

## 2017-09-28 ENCOUNTER — OFFICE VISIT (OUTPATIENT)
Dept: SURGERY | Facility: CLINIC | Age: 52
End: 2017-09-28
Payer: COMMERCIAL

## 2017-09-28 VITALS
WEIGHT: 147.69 LBS | HEIGHT: 71 IN | BODY MASS INDEX: 20.68 KG/M2 | DIASTOLIC BLOOD PRESSURE: 82 MMHG | SYSTOLIC BLOOD PRESSURE: 120 MMHG

## 2017-09-28 DIAGNOSIS — K35.80 ACUTE APPENDICITIS, UNSPECIFIED ACUTE APPENDICITIS TYPE: Primary | ICD-10-CM

## 2017-09-28 PROCEDURE — 99024 POSTOP FOLLOW-UP VISIT: CPT | Mod: ,,, | Performed by: SURGERY

## 2017-09-28 RX ORDER — AMOXICILLIN AND CLAVULANATE POTASSIUM 875; 125 MG/1; MG/1
TABLET, FILM COATED ORAL
Refills: 0 | COMMUNITY
Start: 2017-09-23 | End: 2018-01-25

## 2017-09-28 NOTE — PROGRESS NOTES
Subjective:       Patient ID: Anatoly Hatch III is a 51 y.o. male.    Chief Complaint: Post-op Evaluation (FU DOS 9/21/17 L/S Appy)      HPI:  Anatoly Hatch III is here for post-op. Patient has no systemic complaints. Post operative   pain is under control.  Tolerating diet, no nausea/vomiting.  Having normal bowel movements.    FERNANDA - serous      Objective:      Physical Exam   Constitutional: He is oriented to person, place, and time. He is cooperative. No distress.   Abdominal: Soft. He exhibits no distension. There is no tenderness. There is no rebound and no guarding.   Neurological: He is oriented to person, place, and time.   Skin:   Incisions are clean, dry and intact  There is no evidence of infection, hematoma or seroma        Assessment/Plan:   Acute appendicitis, unspecified acute appendicitis type      Path - ok  FERNANDA removed  RTO next week for staple removal by nurse      Return for F/U - As needed.

## 2017-10-03 ENCOUNTER — DOCUMENTATION ONLY (OUTPATIENT)
Dept: SURGERY | Facility: CLINIC | Age: 52
End: 2017-10-03

## 2017-10-03 NOTE — PROGRESS NOTES
Per Dr. Pedro nathan, all staples removed without complication. Incision healing, clean, dry. Patient did not have any further questions or concerns

## 2017-10-14 NOTE — PROGRESS NOTES
[facility] Urology:    Anatoly Hatch III is a 51 y.o. male who presents for evaluation of left renal mass    He recently presented to [facility] on 9/21/17 with acute lower abdominal pain, found to be from a ruptured appendix, and underwent emergent appendectomy and washout of intra-abdominal abscess with Dr. Vasquez (he was recently seen in follow-up on 9/28/17 for FERNANDA drain removal, and subsequently on 10/3/17 for staple removal).    His contrasted CT scan of the abdomen and pelvis at the time of presentation however noted a 19 x 22 mm hypodense lesion in the left mid renal cortex of indeterminate etiology.  Hypodense tract coursing the renal sinuses noted possibly indicating that this could reflect, at least in part, a calyceal diverticulum.  Negative for hydronephrosis.  Dilation of right ureter occurs proximal to the iliac vessel crossing.  Solid renal neoplasm such as RCC cannot include on the basis of this exam alone.  He did have a follow-up renal ultrasound on 9/22/17 demonstrating a 2.4 x 2.3 x 1.8 cm hypoechoic mixed solid and cystic lesion within the interpolar aspect of the left kidney.  There is color flow seen within this lesion concerning for a solid renal neoplasm such as RCC. There is an adjacent 10 x 6 x 6 mm cyst.  No stones or hydronephrosis are seen.  This is most consistent with a Bosniak 3 lesion.  Creatinine on 9/21/17 was 0.81 (eGFR 114).    He saw VAHE De Dios on 9/25/17, who ordered a CT urogram for further evaluation of this lesion, which demonstrated:  Symmetric nephrograms are noted. There is a mass involving the lower pole left renal cortex which measures approximately 2.6 x 2 x 2.3 cm in greatest transaxial and cephalocaudal dimensions. This appears septated on the postcontrast series. It is difficult to appreciate on the precontrast phase secondary to attenuation similar to that of the kidney, measuring approximately 30 Hounsfield units. It generally measures below 40  Hounsfield units on the contrast phase and ranges between 33 and 48 Hounsfield units on the delayed phase. No additional renal mass is identified. No stones are identified. No hydroureteronephrosis is identified.  There is prostatomegaly. Seminal vesicles are unremarkable. The urinary bladder is mildly well-distended. No focal bladder wall thickening is identified.  No abdominal or pelvic lymphadenopathy is seen. The aorta is non-aneurysmal. There is mild atherosclerosis within the aorta.  1.  Approximately 2.6 cm left lower pole renal cortical mass which demonstrates a lobular/septated appearance and no definitive contrast enhancement/solid components, most consistent with a complex cyst. Notably, this was difficult to evaluate secondary to its small size. Recommend a six-month followup examination, preferably a renal mass protocol MRI for surveillance.    On personal review of imaging, left renal lesion appears largely cystic.  Not well-circumscribed.  Initial view has appearance of large irregular calyceal diverticulum, however no excretion of contrast into it on delayed phase imaging.  Excretory phase does distinctly separate collecting system from this lesion which does extend towards the collecting system.  It is mildly heterogeneous with borderline enhancement more consistent with a Bosniak 3 renal cyst    The patient has no family history of genitourinary malignancy.  He denies flank pain, hematuria.  He is a one pack per day lifetime smoker.  He does duct. Work  He has had left wrist surgery  Urinalysis dipstick today is negative.      Past Medical History:   Diagnosis Date    Abdominal abscess 09/21/2017    Appendicitis 09/21/2017    Hepatitis 2005       Past Surgical History:   Procedure Laterality Date    APPENDECTOMY  09/21/2017    done at [facility] by Dr. Vasquez- [place]       History reviewed. No pertinent family history.    Social History     Social History    Marital status: Single     Spouse name: N/A  "   Number of children: N/A    Years of education: N/A     Occupational History    Not on file.     Social History Main Topics    Smoking status: Current Every Day Smoker     Packs/day: 1.00     Years: 30.00     Types: Cigarettes    Smokeless tobacco: Never Used    Alcohol use Yes      Comment: 4 glasses of whiskey daily    Drug use: No    Sexual activity: Not on file     Other Topics Concern    Not on file     Social History Narrative    No narrative on file       Review of patient's allergies indicates:  No Known Allergies    Medications Reviewed: see MAR    ROS:    As noted above in history of present illness, otherwise negative ×10 systems reviewed.     PHYSICAL EXAM:    Vitals:    10/16/17 1042   BP: (!) 128/90   Pulse: 72   Resp: 18     Body mass index is 22.14 kg/m². Weight: 72 kg (158 lb 11.7 oz) Height: 5' 11" (180.3 cm)       General: Alert, cooperative, no distress, appears stated age  Head: Normocephalic, without obvious abnormality, atraumatic  Neck: no masses, no thyromegaly, no lymphadenopathy  Eyes: PERRL, conjunctiva/corneas clear  Lungs: Respirations unlabored, normal effort, no accessory muscle use  CV: Warm and well perfused extremities  Abdomen: Soft, non-tender, no CVA tenderness, no hepatosplenomegaly, no hernia, well healed RLQ scar  Extremities: Extremities normal, atraumatic, no cyanosis or edema  Skin: Normal color, texture, and turgor, no rashes or lesions  Psych: Appropriate, well oriented, normal affect, normal mood  Neuro: Non-focal      Assessment/Diagnosis:    1. Complex renal cyst  MRI Abdomen W WO Contrast    Basic metabolic panel    POCT URINE DIPSTICK WITHOUT MICROSCOPE    left       Plans:   At this time his renal lesion does appear more consistent with a complex renal cyst, likely a Bosniak 3 cyst based on appearance.  We did discuss that on imaging he does not have a truly solid enhancing mass, and that a solid enhancing renal mass represents malignancy in 80% of " cases.  We did discuss that true enhancement is greater than 20 Hounsfield units, and he did have borderline enhancement up to 18 Hounsfield units and otherwise largely cystic appearing lesion, more consistent with a Bosniak 3 cyst.  We did discuss that these have a 40-50% chance of malignancy.  Given irregularity of lesion extension towards collecting system partial surgical resection may be challenging, and also may be necessary at this time.  We did discuss all options, conservative and surgical, for management of renal lesions and small renal masses less than 4 cm.  Given the complex cystic appearance of this lesion at this time, ultimately recommended observation with close follow-up imaging.  At this time, we'll reimage in 6 months with a renal mass protocol MRI.  If findings are consistent, can screen with ultrasound, repeating cross-sectional imaging as indicated or at least every 1-2 years.  All questions the patient had were answered, and he was agreeable to treatment plan.  RTC 6 months with MRI.  Will need BMP prior to MRI in 6 months

## 2017-10-16 ENCOUNTER — OFFICE VISIT (OUTPATIENT)
Dept: UROLOGY | Facility: CLINIC | Age: 52
End: 2017-10-16
Payer: COMMERCIAL

## 2017-10-16 VITALS
DIASTOLIC BLOOD PRESSURE: 90 MMHG | HEART RATE: 72 BPM | RESPIRATION RATE: 18 BRPM | WEIGHT: 158.75 LBS | BODY MASS INDEX: 22.23 KG/M2 | SYSTOLIC BLOOD PRESSURE: 128 MMHG | HEIGHT: 71 IN

## 2017-10-16 DIAGNOSIS — N28.1 COMPLEX RENAL CYST: Primary | ICD-10-CM

## 2017-10-16 PROCEDURE — 99214 OFFICE O/P EST MOD 30 MIN: CPT | Mod: S$GLB,,, | Performed by: UROLOGY

## 2017-10-16 PROCEDURE — 99999 PR PBB SHADOW E&M-EST. PATIENT-LVL III: CPT | Mod: PBBFAC,,, | Performed by: UROLOGY

## 2018-01-11 ENCOUNTER — TELEPHONE (OUTPATIENT)
Dept: SURGERY | Facility: CLINIC | Age: 53
End: 2018-01-11

## 2018-01-11 DIAGNOSIS — K65.1 INTRA-ABDOMINAL ABSCESS: Primary | ICD-10-CM

## 2018-01-25 ENCOUNTER — OFFICE VISIT (OUTPATIENT)
Dept: SURGERY | Facility: CLINIC | Age: 53
End: 2018-01-25
Payer: COMMERCIAL

## 2018-01-25 VITALS
HEIGHT: 71 IN | BODY MASS INDEX: 22.23 KG/M2 | SYSTOLIC BLOOD PRESSURE: 128 MMHG | DIASTOLIC BLOOD PRESSURE: 90 MMHG | WEIGHT: 158.75 LBS

## 2018-01-25 DIAGNOSIS — K65.1 INTRA-ABDOMINAL ABSCESS: Primary | ICD-10-CM

## 2018-01-25 PROCEDURE — 99213 OFFICE O/P EST LOW 20 MIN: CPT | Mod: ,,, | Performed by: SURGERY

## 2018-01-25 NOTE — PROGRESS NOTES
Subjective:       Patient ID: Anatoly Hatch III is a 52 y.o. male.    Chief Complaint: Other ([facility] FU Diverticulitis)      HPI:  Patient completed his course of IV antibiotics. Is PICC was removed.    He feels completely well. The nagging abdominal discomfort he had previously is completely gone. He is eating a normal diet with no nausea or vomiting. His bowels are working normally.    It turns out he did not take any of the oral antibiotics after his initial surgery as prescribed. This is probably why these small abscesses occurred.    F/U CT - thefluid collections are completely resolved with 3 focal small air collections.    Review of Systems   Constitutional: Negative for appetite change, chills, fever and unexpected weight change.   HENT: Negative for hearing loss, rhinorrhea, sore throat and voice change.    Eyes: Negative for photophobia and visual disturbance.   Respiratory: Negative for cough, choking and shortness of breath.    Cardiovascular: Negative for chest pain, palpitations and leg swelling.   Gastrointestinal: Negative for abdominal pain, blood in stool, constipation, diarrhea, nausea and vomiting.   Endocrine: Negative for cold intolerance, heat intolerance, polydipsia and polyuria.   Musculoskeletal: Negative for arthralgias, back pain, joint swelling and neck stiffness.   Skin: Negative for color change, pallor and rash.   Neurological: Negative for dizziness, seizures, syncope and headaches.   Hematological: Negative for adenopathy. Does not bruise/bleed easily.   Psychiatric/Behavioral: Negative for agitation, behavioral problems and confusion.       Objective:      Physical Exam   Constitutional: He appears well-developed and well-nourished.  Non-toxic appearance. No distress.   HENT:   Head: Normocephalic and atraumatic. Head is without abrasion and without laceration.   Right Ear: External ear normal.   Left Ear: External ear normal.   Nose: Nose normal.   Mouth/Throat: Oropharynx  is clear and moist.   Eyes: EOM are normal. Pupils are equal, round, and reactive to light.   Neck: Trachea normal. No tracheal deviation and normal range of motion present. No thyroid mass and no thyromegaly present.   Cardiovascular: Normal rate and regular rhythm.    Pulmonary/Chest: Effort normal. No accessory muscle usage. No tachypnea. No respiratory distress.   Abdominal: Soft. Normal appearance and bowel sounds are normal. He exhibits no distension and no mass. There is no hepatosplenomegaly. There is no tenderness. There is no tenderness at McBurney's point and negative Robbins's sign. No hernia.   Lymphadenopathy:     He has no cervical adenopathy.     He has no axillary adenopathy.        Right: No inguinal adenopathy present.        Left: No inguinal adenopathy present.   Neurological: He is alert. Coordination and gait normal.   Skin: Skin is warm. Laceration (mulitple small superficial abrasions on hands) noted.   Psychiatric: He has a normal mood and affect. His speech is normal and behavior is normal.       Assessment/Plan:   Intra-abdominal abscess      CT reviewed  Process appears to be completely resolved    Follow-up for F/U - As needed.

## 2018-02-25 LAB
BUN SERPL-MCNC: 7 MG/DL (ref 8–20)
CALCIUM SERPL-MCNC: 8.5 MG/DL (ref 7.7–10.4)
CHLORIDE: 100 MMOL/L (ref 98–110)
CO2 SERPL-SCNC: 27.3 MMOL/L (ref 22.8–31.6)
CREATININE: 0.66 MG/DL (ref 0.6–1.4)
GLUCOSE: 108 MG/DL (ref 70–99)
HCT VFR BLD AUTO: 41.5 % (ref 39–55)
HGB BLD-MCNC: 13.9 G/DL (ref 14–16)
MCH RBC QN AUTO: 32 PG (ref 25–35)
MCHC RBC AUTO-ENTMCNC: 33.5 G/DL (ref 31–36)
MCV RBC AUTO: 95.6 FL (ref 80–100)
NUCLEATED RBCS: 0 %
PLATELET # BLD AUTO: 259 K/UL (ref 140–440)
POTASSIUM SERPL-SCNC: 3.9 MMOL/L (ref 3.5–5)
RBC # BLD AUTO: 4.34 M/UL (ref 4.3–5.9)
SODIUM: 134 MMOL/L (ref 134–144)
WBC # BLD AUTO: 14.7 K/UL (ref 5–10)

## 2018-02-26 LAB
BASOPHILS NFR BLD: 0 K/UL (ref 0–0.2)
BASOPHILS NFR BLD: 0.3 %
BUN SERPL-MCNC: 8 MG/DL (ref 8–20)
CALCIUM SERPL-MCNC: 8.7 MG/DL (ref 7.7–10.4)
CHLORIDE: 101 MMOL/L (ref 98–110)
CO2 SERPL-SCNC: 28.3 MMOL/L (ref 22.8–31.6)
CREATININE: 0.73 MG/DL (ref 0.6–1.4)
EOSINOPHIL NFR BLD: 0.1 K/UL (ref 0–0.7)
EOSINOPHIL NFR BLD: 0.9 %
ERYTHROCYTE [DISTWIDTH] IN BLOOD BY AUTOMATED COUNT: 13.2 % (ref 11.7–14.9)
GLUCOSE: 92 MG/DL (ref 70–99)
GRAN #: 6.2 K/UL (ref 1.4–6.5)
GRAN%: 64.4 %
HCT VFR BLD AUTO: 40.5 % (ref 39–55)
HGB BLD-MCNC: 13.5 G/DL (ref 14–16)
IMMATURE GRANS (ABS): 0 K/UL (ref 0–1)
IMMATURE GRANULOCYTES: 0.3 %
LYMPH #: 2.4 K/UL (ref 1.2–3.4)
LYMPH%: 25.2 %
MCH RBC QN AUTO: 32.3 PG (ref 25–35)
MCHC RBC AUTO-ENTMCNC: 33.3 G/DL (ref 31–36)
MCV RBC AUTO: 96.9 FL (ref 80–100)
MONO #: 0.9 K/UL (ref 0.1–0.6)
MONO%: 8.9 %
NUCLEATED RBCS: 0 %
PLATELET # BLD AUTO: 237 K/UL (ref 140–440)
PMV BLD AUTO: 11 FL (ref 8.8–12.7)
POTASSIUM SERPL-SCNC: 3.9 MMOL/L (ref 3.5–5)
RBC # BLD AUTO: 4.18 M/UL (ref 4.3–5.9)
SODIUM: 137 MMOL/L (ref 134–144)
WBC # BLD AUTO: 9.6 K/UL (ref 5–10)

## 2018-02-27 LAB
BASOPHILS NFR BLD: 0 K/UL (ref 0–0.2)
BASOPHILS NFR BLD: 0.3 %
BUN SERPL-MCNC: 9 MG/DL (ref 8–20)
CALCIUM SERPL-MCNC: 8.6 MG/DL (ref 7.7–10.4)
CHLORIDE: 99 MMOL/L (ref 98–110)
CO2 SERPL-SCNC: 25.9 MMOL/L (ref 22.8–31.6)
CREATININE: 0.75 MG/DL (ref 0.6–1.4)
EOSINOPHIL NFR BLD: 0.1 K/UL (ref 0–0.7)
EOSINOPHIL NFR BLD: 1.4 %
ERYTHROCYTE [DISTWIDTH] IN BLOOD BY AUTOMATED COUNT: 12.9 % (ref 11.7–14.9)
GLUCOSE: 99 MG/DL (ref 70–99)
GRAN #: 4 K/UL (ref 1.4–6.5)
GRAN%: 61.1 %
HCT VFR BLD AUTO: 38.8 % (ref 39–55)
HGB BLD-MCNC: 13.4 G/DL (ref 14–16)
IMMATURE GRANS (ABS): 0 K/UL (ref 0–1)
IMMATURE GRANULOCYTES: 0.3 %
LYMPH #: 1.6 K/UL (ref 1.2–3.4)
LYMPH%: 25.1 %
MCH RBC QN AUTO: 32.8 PG (ref 25–35)
MCHC RBC AUTO-ENTMCNC: 34.5 G/DL (ref 31–36)
MCV RBC AUTO: 95.1 FL (ref 80–100)
MONO #: 0.8 K/UL (ref 0.1–0.6)
MONO%: 11.8 %
NUCLEATED RBCS: 0 %
PLATELET # BLD AUTO: 251 K/UL (ref 140–440)
PMV BLD AUTO: 10.9 FL (ref 8.8–12.7)
POTASSIUM SERPL-SCNC: 3.4 MMOL/L (ref 3.5–5)
RBC # BLD AUTO: 4.08 M/UL (ref 4.3–5.9)
SODIUM: 134 MMOL/L (ref 134–144)
WBC # BLD AUTO: 6.5 K/UL (ref 5–10)

## 2018-02-28 LAB
BASOPHILS NFR BLD: 0 K/UL (ref 0–0.2)
BASOPHILS NFR BLD: 0.2 %
BUN SERPL-MCNC: 7 MG/DL (ref 8–20)
CALCIUM SERPL-MCNC: 8.5 MG/DL (ref 7.7–10.4)
CHLORIDE: 101 MMOL/L (ref 98–110)
CO2 SERPL-SCNC: 27.5 MMOL/L (ref 22.8–31.6)
CREATININE: 0.72 MG/DL (ref 0.6–1.4)
EOSINOPHIL NFR BLD: 0.3 K/UL (ref 0–0.7)
EOSINOPHIL NFR BLD: 3.3 %
ERYTHROCYTE [DISTWIDTH] IN BLOOD BY AUTOMATED COUNT: 12.7 % (ref 11.7–14.9)
GLUCOSE: 95 MG/DL (ref 70–99)
GRAN #: 5.2 K/UL (ref 1.4–6.5)
GRAN%: 59.5 %
HCT VFR BLD AUTO: 38.4 % (ref 39–55)
HGB BLD-MCNC: 13.1 G/DL (ref 14–16)
IMMATURE GRANS (ABS): 0 K/UL (ref 0–1)
IMMATURE GRANULOCYTES: 0.2 %
LYMPH #: 2.4 K/UL (ref 1.2–3.4)
LYMPH%: 28 %
MCH RBC QN AUTO: 32.5 PG (ref 25–35)
MCHC RBC AUTO-ENTMCNC: 34.1 G/DL (ref 31–36)
MCV RBC AUTO: 95.3 FL (ref 80–100)
MONO #: 0.8 K/UL (ref 0.1–0.6)
MONO%: 8.8 %
NUCLEATED RBCS: 0 %
PLATELET # BLD AUTO: 248 K/UL (ref 140–440)
PMV BLD AUTO: 10.8 FL (ref 8.8–12.7)
POTASSIUM SERPL-SCNC: 3.3 MMOL/L (ref 3.5–5)
RBC # BLD AUTO: 4.03 M/UL (ref 4.3–5.9)
SODIUM: 135 MMOL/L (ref 134–144)
WBC # BLD AUTO: 8.7 K/UL (ref 5–10)

## 2018-03-01 LAB
BUN SERPL-MCNC: 6 MG/DL (ref 8–20)
CALCIUM SERPL-MCNC: 9.2 MG/DL (ref 7.7–10.4)
CHLORIDE: 103 MMOL/L (ref 98–110)
CO2 SERPL-SCNC: 25.4 MMOL/L (ref 22.8–31.6)
CREATININE: 0.76 MG/DL (ref 0.6–1.4)
GLUCOSE: 143 MG/DL (ref 70–99)
HCT VFR BLD AUTO: 43.8 % (ref 39–55)
HGB BLD-MCNC: 15 G/DL (ref 14–16)
MAGNESIUM SERPL-MCNC: 2 MG/DL (ref 1.5–2.6)
MCH RBC QN AUTO: 32.4 PG (ref 25–35)
MCHC RBC AUTO-ENTMCNC: 34.2 G/DL (ref 31–36)
MCV RBC AUTO: 94.6 FL (ref 80–100)
NUCLEATED RBCS: 0 %
PLATELET # BLD AUTO: 297 K/UL (ref 140–440)
POTASSIUM SERPL-SCNC: 3.5 MMOL/L (ref 3.5–5)
RBC # BLD AUTO: 4.63 M/UL (ref 4.3–5.9)
SODIUM: 137 MMOL/L (ref 134–144)
WBC # BLD AUTO: 8.4 K/UL (ref 5–10)

## 2018-03-08 ENCOUNTER — OFFICE VISIT (OUTPATIENT)
Dept: SURGERY | Facility: CLINIC | Age: 53
End: 2018-03-08
Payer: COMMERCIAL

## 2018-03-08 VITALS
WEIGHT: 158 LBS | DIASTOLIC BLOOD PRESSURE: 90 MMHG | HEIGHT: 71 IN | BODY MASS INDEX: 22.12 KG/M2 | SYSTOLIC BLOOD PRESSURE: 128 MMHG

## 2018-03-08 DIAGNOSIS — R19.8 PERFORATED VISCUS: Primary | ICD-10-CM

## 2018-03-08 PROCEDURE — 99024 POSTOP FOLLOW-UP VISIT: CPT | Mod: ,,, | Performed by: SURGERY

## 2018-03-12 NOTE — PROGRESS NOTES
Subjective:       Patient ID: Anatoly Hatch III is a 52 y.o. male.    Chief Complaint: Post-op Evaluation (Sm. Bowel R/S w/drainage of abscess - FERNANDA Drain - 2/25/18)      HPI:  Patient presents for postop visit. He presented with free intra-abdominal air and small bowel obstruction. He was found to have small bowel perforation due to small bowel obstruction. Surgery included washout of the abdomen and resection of small bowel in 2 places. He is doing well. He is tolerating diet. Pain controlled.    Review of Systems   Constitutional: Negative for appetite change, chills, fever and unexpected weight change.   HENT: Negative for hearing loss, rhinorrhea, sore throat and voice change.    Eyes: Negative for photophobia and visual disturbance.   Respiratory: Negative for cough, choking and shortness of breath.    Cardiovascular: Negative for chest pain, palpitations and leg swelling.   Gastrointestinal: Negative for abdominal pain, blood in stool, constipation, diarrhea, nausea and vomiting.   Endocrine: Negative for cold intolerance, heat intolerance, polydipsia and polyuria.   Musculoskeletal: Negative for arthralgias, back pain, joint swelling and neck stiffness.   Skin: Negative for color change, pallor and rash.   Neurological: Negative for dizziness, seizures, syncope and headaches.   Hematological: Negative for adenopathy. Does not bruise/bleed easily.   Psychiatric/Behavioral: Negative for agitation, behavioral problems and confusion.       Objective:      Physical Exam   Constitutional: He is oriented to person, place, and time. He is cooperative. No distress.   Pulmonary/Chest: Effort normal. No respiratory distress.   Abdominal: Soft. He exhibits no distension. There is no tenderness. There is no rebound and no guarding.   Neurological: He is alert and oriented to person, place, and time.   Skin:   Incision is clean, dry and intact  There is no evidence of infection, hematoma or seroma         Assessment/Plan:   Perforated viscus      Status post laparotomy with small bowel resection. Doing well. Staples removed today. Continue no heavy lifting for 4 more weeks.   Follow-up if symptoms worsen or fail to improve.

## 2018-04-09 ENCOUNTER — HOSPITAL ENCOUNTER (OUTPATIENT)
Dept: RADIOLOGY | Facility: HOSPITAL | Age: 53
Discharge: HOME | End: 2018-04-09
Attending: UROLOGY
Payer: COMMERCIAL

## 2018-04-09 DIAGNOSIS — N28.1 COMPLEX RENAL CYST: ICD-10-CM

## 2018-04-09 PROCEDURE — 74183 MRI ABD W/O CNTR FLWD CNTR: CPT | Mod: TC

## 2018-04-09 PROCEDURE — 25500020 PHARM REV CODE 255: Performed by: UROLOGY

## 2018-04-09 PROCEDURE — A9585 GADOBUTROL INJECTION: HCPCS | Performed by: UROLOGY

## 2018-04-09 PROCEDURE — 74183 MRI ABD W/O CNTR FLWD CNTR: CPT | Mod: 26,,, | Performed by: RADIOLOGY

## 2018-04-09 RX ORDER — SODIUM CHLORIDE 9 MG/ML
INJECTION, SOLUTION INTRAVENOUS
Status: DISPENSED
Start: 2018-04-09 | End: 2018-04-09

## 2018-04-09 RX ORDER — GADOBUTROL 604.72 MG/ML
7 INJECTION INTRAVENOUS
Status: COMPLETED | OUTPATIENT
Start: 2018-04-09 | End: 2018-04-09

## 2018-04-09 RX ORDER — GADOBUTROL 604.72 MG/ML
INJECTION INTRAVENOUS
Status: DISPENSED
Start: 2018-04-09 | End: 2018-04-09

## 2018-04-09 RX ADMIN — GADOBUTROL 7 ML: 604.72 INJECTION INTRAVENOUS at 08:04

## 2018-05-13 NOTE — PROGRESS NOTES
[facility] Urology Progress Note    Anatoly Hatch III is a 52 y.o. male who presents for evaluation of left renal mass    He recently presented to [facility] on 9/21/17 with acute lower abdominal pain, found to be from a ruptured appendix, and underwent emergent appendectomy and washout of intra-abdominal abscess with Dr. Vasquez   His contrasted CT scan of the abdomen and pelvis at the time of presentation however noted a 19 x 22 mm hypodense lesion in the left mid renal cortex of indeterminate etiology.  Hypodense tract coursing the renal sinuses noted possibly indicating that this could reflect, at least in part, a calyceal diverticulum.  Negative for hydronephrosis.  Dilation of right ureter occurs proximal to the iliac vessel crossing.  Solid renal neoplasm such as RCC cannot include on the basis of this exam alone.  He did have a follow-up renal ultrasound on 9/22/17 demonstrating a 2.4 x 2.3 x 1.8 cm hypoechoic mixed solid and cystic lesion within the interpolar aspect of the left kidney.  There is color flow seen within this lesion concerning for a solid renal neoplasm such as RCC. There is an adjacent 10 x 6 x 6 mm cyst.  No stones or hydronephrosis are seen.  This is most consistent with a Bosniak 3 lesion.  Creatinine on 9/21/17 was 0.81 (eGFR 114).     He saw VAHE De Dios on 9/25/17, who ordered a CT urogram for further evaluation of this lesion, which demonstrated:  Symmetric nephrograms are noted. There is a mass involving the lower pole left renal cortex which measures approximately 2.6 x 2 x 2.3 cm in greatest transaxial and cephalocaudal dimensions. This appears septated on the postcontrast series. It is difficult to appreciate on the precontrast phase secondary to attenuation similar to that of the kidney, measuring approximately 30 Hounsfield units. It generally measures below 40 Hounsfield units on the contrast phase and ranges between 33 and 48 Hounsfield units on the delayed  phase. No additional renal mass is identified. No stones are identified. No hydroureteronephrosis is identified.  There is prostatomegaly. Seminal vesicles are unremarkable. The urinary bladder is mildly well-distended. No focal bladder wall thickening is identified.  No abdominal or pelvic lymphadenopathy is seen. The aorta is non-aneurysmal. There is mild atherosclerosis within the aorta.  1.  Approximately 2.6 cm left lower pole renal cortical mass which demonstrates a lobular/septated appearance and no definitive contrast enhancement/solid components, most consistent with a complex cyst. Notably, this was difficult to evaluate secondary to its small size. Recommend a six-month followup examination, preferably a renal mass protocol MRI for surveillance.   On personal review of imaging, left renal lesion appears largely cystic.  Not well-circumscribed.  Initial view has appearance of large irregular calyceal diverticulum, however no excretion of contrast into it on delayed phase imaging.  Excretory phase does distinctly separate collecting system from this lesion which does extend towards the collecting system.  It is mildly heterogeneous with borderline enhancement more consistent with a Bosniak 2-3 renal cyst   The patient has no family history of genitourinary malignancy.  He denies flank pain, hematuria. He is a one pack per day lifetime smoker.    In the interim he had diverticulitis with abscesses in January 2018 treated with PICC Iv abx noted to resolve on f/u with Dr Vasquez 1/25/18  He then presented with free intra-abdominal air and small bowel obstruction. He was found to have small bowel perforation due to small bowel obstruction. Surgery included washout of the abdomen and resection of small bowel in 2 places 2/25/18.  Noted to be doing well in follow up with Dr Schulte on 3/8/18.    He did have renal mass protocol MRI abdomen on 4/9/18 confirming complex cyst, but without any enhancement, more  "consistent with Bosniak 2 cyst  Within the left kidney, there is a T2 hyperintense and T1 hypointense, septated cyst with multiple thin septations.  It measures approximately 2.7 cm in greatest cephalocaudal dimension and approximately 2.8 x 1.8 cm in greatest transaxial dimensions.  This is stable when compared to the prior study allowing for differences in technique/modality.  No enhancing components are identified.  No additional renal mass is present.  Approximately 2.8 cm multi-septated cyst within the lower lateral left kidney demonstrates thin septations and no appreciable enhancement, most consistent with a mildly complex (i.e.  Bosniak 2) cyst.  This is stable in size compared to the 09/25/2017 CT urogram allowing for technical differences.    No interim flank pain, hematuria, dysuria      ROS: A comprehensive 10 system review was performed and is negative except as noted above in HPI    PHYSICAL EXAM:    Vitals:    05/14/18 1055   BP: 116/76   Pulse: 85   Resp: 18     Body mass index is 20.92 kg/m². Weight: 68 kg (150 lb) Height: 5' 11" (180.3 cm)       General: Alert, cooperative, no distress, appears stated age   Head: Normocephalic, without obvious abnormality, atraumatic   Eyes: PERRL, conjunctiva/corneas clear   Lungs: Respirations unlabored   Heart: Warm and well perfused   Abdomen: soft NT ND, well healing midline incision  Extremities: Extremities normal, atraumatic, no cyanosis or edema   Skin: Skin color, texture, turgor normal, no rashes or lesions   Psych: Appropriate   Neurologic: Non-focal       ASSESSMENT   1. Renal cyst, left  US Retroperitoneal Limited   2. Prostate cancer screening  PSA, SCREENING       Plan    Without any noted MRI enhancement, nor previous CT significant enhancement, most likely represents benign complex cyst and can be followed annually with ultrasound and cross sectional imaging can be repeated as dictated by changes on screening ultrasounds if present.  RTC 1 year " with RADAMES  Will also get PSA prior to RTC to start annual CaP screening

## 2018-05-14 ENCOUNTER — OFFICE VISIT (OUTPATIENT)
Dept: UROLOGY | Facility: CLINIC | Age: 53
End: 2018-05-14
Payer: COMMERCIAL

## 2018-05-14 VITALS
RESPIRATION RATE: 18 BRPM | HEIGHT: 71 IN | DIASTOLIC BLOOD PRESSURE: 76 MMHG | BODY MASS INDEX: 21 KG/M2 | WEIGHT: 150 LBS | SYSTOLIC BLOOD PRESSURE: 116 MMHG | HEART RATE: 85 BPM

## 2018-05-14 DIAGNOSIS — N28.1 RENAL CYST, LEFT: Primary | ICD-10-CM

## 2018-05-14 DIAGNOSIS — Z12.5 PROSTATE CANCER SCREENING: ICD-10-CM

## 2018-05-14 PROCEDURE — 99213 OFFICE O/P EST LOW 20 MIN: CPT | Mod: S$GLB,,, | Performed by: UROLOGY

## 2018-05-14 PROCEDURE — 3008F BODY MASS INDEX DOCD: CPT | Mod: CPTII,S$GLB,, | Performed by: UROLOGY

## 2018-05-14 PROCEDURE — 99999 PR PBB SHADOW E&M-EST. PATIENT-LVL III: CPT | Mod: PBBFAC,,, | Performed by: UROLOGY

## 2021-04-05 ENCOUNTER — HOSPITAL ENCOUNTER (OUTPATIENT)
Facility: HOSPITAL | Age: 56
Discharge: HOME | End: 2021-04-06
Attending: EMERGENCY MEDICINE | Admitting: INTERNAL MEDICINE
Payer: COMMERCIAL

## 2021-04-05 DIAGNOSIS — R10.9 ABDOMINAL PAIN, UNSPECIFIED ABDOMINAL LOCATION: Primary | ICD-10-CM

## 2021-04-05 DIAGNOSIS — R07.9 CHEST PAIN: ICD-10-CM

## 2021-04-05 LAB
ALBUMIN SERPL BCP-MCNC: 4 G/DL (ref 3.5–5.2)
ALP SERPL-CCNC: 64 U/L (ref 55–135)
ALT SERPL W/O P-5'-P-CCNC: 139 U/L (ref 10–44)
ANION GAP SERPL CALC-SCNC: 10 MMOL/L (ref 8–16)
AST SERPL-CCNC: 93 U/L (ref 10–40)
BASOPHILS # BLD AUTO: 0.08 K/UL (ref 0–0.2)
BASOPHILS NFR BLD: 0.9 % (ref 0–1.9)
BILIRUB SERPL-MCNC: 1.2 MG/DL (ref 0.1–1)
BILIRUB UR QL STRIP: NEGATIVE
BUN SERPL-MCNC: 17 MG/DL (ref 6–20)
CALCIUM SERPL-MCNC: 9.5 MG/DL (ref 8.7–10.5)
CHLORIDE SERPL-SCNC: 106 MMOL/L (ref 95–110)
CLARITY UR: CLEAR
CO2 SERPL-SCNC: 24 MMOL/L (ref 23–29)
COLOR UR: YELLOW
CREAT SERPL-MCNC: 0.6 MG/DL (ref 0.5–1.4)
CREAT SERPL-MCNC: 0.7 MG/DL (ref 0.5–1.4)
DIFFERENTIAL METHOD: ABNORMAL
EOSINOPHIL # BLD AUTO: 0.1 K/UL (ref 0–0.5)
EOSINOPHIL NFR BLD: 0.9 % (ref 0–8)
ERYTHROCYTE [DISTWIDTH] IN BLOOD BY AUTOMATED COUNT: 12.8 % (ref 11.5–14.5)
EST. GFR  (AFRICAN AMERICAN): >60 ML/MIN/1.73 M^2
EST. GFR  (NON AFRICAN AMERICAN): >60 ML/MIN/1.73 M^2
GLUCOSE SERPL-MCNC: 106 MG/DL (ref 70–110)
GLUCOSE UR QL STRIP: NEGATIVE
HCT VFR BLD AUTO: 44.8 % (ref 40–54)
HGB BLD-MCNC: 15.1 G/DL (ref 14–18)
HGB UR QL STRIP: NEGATIVE
IMM GRANULOCYTES # BLD AUTO: 0.02 K/UL (ref 0–0.04)
IMM GRANULOCYTES NFR BLD AUTO: 0.2 % (ref 0–0.5)
KETONES UR QL STRIP: NEGATIVE
LEUKOCYTE ESTERASE UR QL STRIP: NEGATIVE
LIPASE SERPL-CCNC: 27 U/L (ref 4–60)
LYMPHOCYTES # BLD AUTO: 4.1 K/UL (ref 1–4.8)
LYMPHOCYTES NFR BLD: 44.9 % (ref 18–48)
MCH RBC QN AUTO: 32.3 PG (ref 27–31)
MCHC RBC AUTO-ENTMCNC: 33.7 G/DL (ref 32–36)
MCV RBC AUTO: 96 FL (ref 82–98)
MONOCYTES # BLD AUTO: 0.7 K/UL (ref 0.3–1)
MONOCYTES NFR BLD: 8 % (ref 4–15)
NEUTROPHILS # BLD AUTO: 4.1 K/UL (ref 1.8–7.7)
NEUTROPHILS NFR BLD: 45.1 % (ref 38–73)
NITRITE UR QL STRIP: NEGATIVE
NRBC BLD-RTO: 0 /100 WBC
PH UR STRIP: 6 [PH] (ref 5–8)
PLATELET # BLD AUTO: 233 K/UL (ref 150–450)
PMV BLD AUTO: 10.9 FL (ref 9.2–12.9)
POTASSIUM SERPL-SCNC: 4.2 MMOL/L (ref 3.5–5.1)
PROT SERPL-MCNC: 7.9 G/DL (ref 6–8.4)
PROT UR QL STRIP: NEGATIVE
RBC # BLD AUTO: 4.68 M/UL (ref 4.6–6.2)
SAMPLE: NORMAL
SARS-COV-2 RDRP RESP QL NAA+PROBE: NEGATIVE
SODIUM SERPL-SCNC: 140 MMOL/L (ref 136–145)
SP GR UR STRIP: 1.02 (ref 1–1.03)
TROPONIN I SERPL DL<=0.01 NG/ML-MCNC: <0.03 NG/ML
URN SPEC COLLECT METH UR: ABNORMAL
UROBILINOGEN UR STRIP-ACNC: ABNORMAL EU/DL
WBC # BLD AUTO: 9.05 K/UL (ref 3.9–12.7)

## 2021-04-05 PROCEDURE — 83690 ASSAY OF LIPASE: CPT | Performed by: EMERGENCY MEDICINE

## 2021-04-05 PROCEDURE — 84484 ASSAY OF TROPONIN QUANT: CPT | Performed by: NURSE PRACTITIONER

## 2021-04-05 PROCEDURE — 96365 THER/PROPH/DIAG IV INF INIT: CPT | Mod: 59

## 2021-04-05 PROCEDURE — G0378 HOSPITAL OBSERVATION PER HR: HCPCS

## 2021-04-05 PROCEDURE — 36415 COLL VENOUS BLD VENIPUNCTURE: CPT | Performed by: NURSE PRACTITIONER

## 2021-04-05 PROCEDURE — U0002 COVID-19 LAB TEST NON-CDC: HCPCS | Performed by: EMERGENCY MEDICINE

## 2021-04-05 PROCEDURE — 80053 COMPREHEN METABOLIC PANEL: CPT | Performed by: EMERGENCY MEDICINE

## 2021-04-05 PROCEDURE — 36415 COLL VENOUS BLD VENIPUNCTURE: CPT | Performed by: EMERGENCY MEDICINE

## 2021-04-05 PROCEDURE — 63600175 PHARM REV CODE 636 W HCPCS: Performed by: EMERGENCY MEDICINE

## 2021-04-05 PROCEDURE — 99285 EMERGENCY DEPT VISIT HI MDM: CPT | Mod: 25

## 2021-04-05 PROCEDURE — 85025 COMPLETE CBC W/AUTO DIFF WBC: CPT | Performed by: EMERGENCY MEDICINE

## 2021-04-05 PROCEDURE — 25500020 PHARM REV CODE 255: Performed by: EMERGENCY MEDICINE

## 2021-04-05 PROCEDURE — 81003 URINALYSIS AUTO W/O SCOPE: CPT | Performed by: EMERGENCY MEDICINE

## 2021-04-05 PROCEDURE — 87040 BLOOD CULTURE FOR BACTERIA: CPT | Performed by: EMERGENCY MEDICINE

## 2021-04-05 RX ORDER — HYDROCODONE BITARTRATE AND ACETAMINOPHEN 5; 325 MG/1; MG/1
1 TABLET ORAL EVERY 6 HOURS PRN
Status: DISCONTINUED | OUTPATIENT
Start: 2021-04-05 | End: 2021-04-06 | Stop reason: HOSPADM

## 2021-04-05 RX ORDER — LORAZEPAM 2 MG/ML
2 INJECTION INTRAMUSCULAR
Status: DISCONTINUED | OUTPATIENT
Start: 2021-04-05 | End: 2021-04-06 | Stop reason: HOSPADM

## 2021-04-05 RX ORDER — LANOLIN ALCOHOL/MO/W.PET/CERES
800 CREAM (GRAM) TOPICAL
Status: DISCONTINUED | OUTPATIENT
Start: 2021-04-05 | End: 2021-04-06 | Stop reason: HOSPADM

## 2021-04-05 RX ORDER — LORAZEPAM 2 MG/ML
1 INJECTION INTRAMUSCULAR
Status: DISCONTINUED | OUTPATIENT
Start: 2021-04-05 | End: 2021-04-06 | Stop reason: HOSPADM

## 2021-04-05 RX ORDER — ONDANSETRON 2 MG/ML
4 INJECTION INTRAMUSCULAR; INTRAVENOUS EVERY 6 HOURS PRN
Status: DISCONTINUED | OUTPATIENT
Start: 2021-04-05 | End: 2021-04-06 | Stop reason: HOSPADM

## 2021-04-05 RX ORDER — SODIUM CHLORIDE 0.9 % (FLUSH) 0.9 %
10 SYRINGE (ML) INJECTION
Status: DISCONTINUED | OUTPATIENT
Start: 2021-04-05 | End: 2021-04-06 | Stop reason: HOSPADM

## 2021-04-05 RX ORDER — AMOXICILLIN 250 MG
1 CAPSULE ORAL 2 TIMES DAILY PRN
Status: DISCONTINUED | OUTPATIENT
Start: 2021-04-05 | End: 2021-04-06 | Stop reason: HOSPADM

## 2021-04-05 RX ORDER — MORPHINE SULFATE 2 MG/ML
2 INJECTION, SOLUTION INTRAMUSCULAR; INTRAVENOUS EVERY 4 HOURS PRN
Status: DISCONTINUED | OUTPATIENT
Start: 2021-04-05 | End: 2021-04-06 | Stop reason: HOSPADM

## 2021-04-05 RX ORDER — ACETAMINOPHEN 325 MG/1
650 TABLET ORAL EVERY 4 HOURS PRN
Status: DISCONTINUED | OUTPATIENT
Start: 2021-04-05 | End: 2021-04-06 | Stop reason: HOSPADM

## 2021-04-05 RX ORDER — LORAZEPAM 2 MG/ML
0.5 INJECTION INTRAMUSCULAR
Status: DISCONTINUED | OUTPATIENT
Start: 2021-04-05 | End: 2021-04-06 | Stop reason: HOSPADM

## 2021-04-05 RX ADMIN — IOHEXOL 100 ML: 350 INJECTION, SOLUTION INTRAVENOUS at 02:04

## 2021-04-05 RX ADMIN — PIPERACILLIN AND TAZOBACTAM 4.5 G: 4; .5 INJECTION, POWDER, LYOPHILIZED, FOR SOLUTION INTRAVENOUS; PARENTERAL at 05:04

## 2021-04-06 VITALS
DIASTOLIC BLOOD PRESSURE: 84 MMHG | BODY MASS INDEX: 21.7 KG/M2 | TEMPERATURE: 98 F | WEIGHT: 155 LBS | HEART RATE: 76 BPM | OXYGEN SATURATION: 96 % | SYSTOLIC BLOOD PRESSURE: 123 MMHG | RESPIRATION RATE: 18 BRPM | HEIGHT: 71 IN

## 2021-04-06 PROBLEM — N28.89 KIDNEY MASS: Status: ACTIVE | Noted: 2021-04-06

## 2021-04-06 PROBLEM — R10.9 ABDOMINAL PAIN: Status: RESOLVED | Noted: 2021-04-05 | Resolved: 2021-04-06

## 2021-04-06 PROBLEM — S62.102A LEFT WRIST FRACTURE: Status: RESOLVED | Noted: 2017-03-24 | Resolved: 2021-04-06

## 2021-04-06 LAB
ALBUMIN SERPL BCP-MCNC: 3.7 G/DL (ref 3.5–5.2)
ALP SERPL-CCNC: 63 U/L (ref 55–135)
ALT SERPL W/O P-5'-P-CCNC: 124 U/L (ref 10–44)
ANION GAP SERPL CALC-SCNC: 11 MMOL/L (ref 8–16)
AST SERPL-CCNC: 77 U/L (ref 10–40)
BASOPHILS # BLD AUTO: 0.05 K/UL (ref 0–0.2)
BASOPHILS NFR BLD: 0.6 % (ref 0–1.9)
BILIRUB SERPL-MCNC: 1.7 MG/DL (ref 0.1–1)
BUN SERPL-MCNC: 17 MG/DL (ref 6–20)
CALCIUM SERPL-MCNC: 9.3 MG/DL (ref 8.7–10.5)
CHLORIDE SERPL-SCNC: 105 MMOL/L (ref 95–110)
CO2 SERPL-SCNC: 26 MMOL/L (ref 23–29)
CREAT SERPL-MCNC: 0.8 MG/DL (ref 0.5–1.4)
DIFFERENTIAL METHOD: ABNORMAL
EOSINOPHIL # BLD AUTO: 0.3 K/UL (ref 0–0.5)
EOSINOPHIL NFR BLD: 3.8 % (ref 0–8)
ERYTHROCYTE [DISTWIDTH] IN BLOOD BY AUTOMATED COUNT: 12.6 % (ref 11.5–14.5)
EST. GFR  (AFRICAN AMERICAN): >60 ML/MIN/1.73 M^2
EST. GFR  (NON AFRICAN AMERICAN): >60 ML/MIN/1.73 M^2
GLUCOSE SERPL-MCNC: 104 MG/DL (ref 70–110)
HCT VFR BLD AUTO: 44.5 % (ref 40–54)
HGB BLD-MCNC: 15.2 G/DL (ref 14–18)
IMM GRANULOCYTES # BLD AUTO: 0.02 K/UL (ref 0–0.04)
IMM GRANULOCYTES NFR BLD AUTO: 0.3 % (ref 0–0.5)
LYMPHOCYTES # BLD AUTO: 3.4 K/UL (ref 1–4.8)
LYMPHOCYTES NFR BLD: 42.8 % (ref 18–48)
MAGNESIUM SERPL-MCNC: 1.9 MG/DL (ref 1.6–2.6)
MCH RBC QN AUTO: 32.3 PG (ref 27–31)
MCHC RBC AUTO-ENTMCNC: 34.2 G/DL (ref 32–36)
MCV RBC AUTO: 95 FL (ref 82–98)
MONOCYTES # BLD AUTO: 0.8 K/UL (ref 0.3–1)
MONOCYTES NFR BLD: 9.5 % (ref 4–15)
NEUTROPHILS # BLD AUTO: 3.4 K/UL (ref 1.8–7.7)
NEUTROPHILS NFR BLD: 43 % (ref 38–73)
NRBC BLD-RTO: 0 /100 WBC
PHOSPHATE SERPL-MCNC: 4.2 MG/DL (ref 2.7–4.5)
PLATELET # BLD AUTO: 228 K/UL (ref 150–450)
PMV BLD AUTO: 10.8 FL (ref 9.2–12.9)
POTASSIUM SERPL-SCNC: 3.8 MMOL/L (ref 3.5–5.1)
PROT SERPL-MCNC: 7.5 G/DL (ref 6–8.4)
RBC # BLD AUTO: 4.7 M/UL (ref 4.6–6.2)
SODIUM SERPL-SCNC: 142 MMOL/L (ref 136–145)
TROPONIN I SERPL DL<=0.01 NG/ML-MCNC: <0.03 NG/ML
TROPONIN I SERPL DL<=0.01 NG/ML-MCNC: <0.03 NG/ML
WBC # BLD AUTO: 7.97 K/UL (ref 3.9–12.7)

## 2021-04-06 PROCEDURE — 85025 COMPLETE CBC W/AUTO DIFF WBC: CPT | Performed by: NURSE PRACTITIONER

## 2021-04-06 PROCEDURE — 84484 ASSAY OF TROPONIN QUANT: CPT | Mod: 91 | Performed by: NURSE PRACTITIONER

## 2021-04-06 PROCEDURE — 84100 ASSAY OF PHOSPHORUS: CPT | Performed by: NURSE PRACTITIONER

## 2021-04-06 PROCEDURE — 36415 COLL VENOUS BLD VENIPUNCTURE: CPT | Performed by: NURSE PRACTITIONER

## 2021-04-06 PROCEDURE — 63600175 PHARM REV CODE 636 W HCPCS: Performed by: NURSE PRACTITIONER

## 2021-04-06 PROCEDURE — G0378 HOSPITAL OBSERVATION PER HR: HCPCS

## 2021-04-06 PROCEDURE — 96376 TX/PRO/DX INJ SAME DRUG ADON: CPT

## 2021-04-06 PROCEDURE — 83735 ASSAY OF MAGNESIUM: CPT | Performed by: NURSE PRACTITIONER

## 2021-04-06 PROCEDURE — 80053 COMPREHEN METABOLIC PANEL: CPT | Performed by: NURSE PRACTITIONER

## 2021-04-06 PROCEDURE — 25000003 PHARM REV CODE 250: Performed by: NURSE PRACTITIONER

## 2021-04-06 RX ORDER — AMOXICILLIN AND CLAVULANATE POTASSIUM 875; 125 MG/1; MG/1
1 TABLET, FILM COATED ORAL EVERY 12 HOURS
Qty: 14 TABLET | Refills: 0 | Status: SHIPPED | OUTPATIENT
Start: 2021-04-06 | End: 2021-04-13

## 2021-04-06 RX ADMIN — PIPERACILLIN AND TAZOBACTAM 3.38 G: 3; .375 INJECTION, POWDER, LYOPHILIZED, FOR SOLUTION INTRAVENOUS; PARENTERAL at 01:04

## 2021-04-06 RX ADMIN — PIPERACILLIN AND TAZOBACTAM 3.38 G: 3; .375 INJECTION, POWDER, LYOPHILIZED, FOR SOLUTION INTRAVENOUS; PARENTERAL at 08:04

## 2021-04-10 LAB
BACTERIA BLD CULT: NORMAL
BACTERIA BLD CULT: NORMAL

## 2021-08-04 ENCOUNTER — HOSPITAL ENCOUNTER (INPATIENT)
Facility: HOSPITAL | Age: 56
LOS: 2 days | Discharge: HOME | DRG: 282 | End: 2021-08-06
Attending: EMERGENCY MEDICINE | Admitting: INTERNAL MEDICINE
Payer: COMMERCIAL

## 2021-08-04 DIAGNOSIS — I10 HYPERTENSION, UNSPECIFIED TYPE: ICD-10-CM

## 2021-08-04 DIAGNOSIS — I21.4 NSTEMI (NON-ST ELEVATED MYOCARDIAL INFARCTION): ICD-10-CM

## 2021-08-04 DIAGNOSIS — I20.0 UNSTABLE ANGINA PECTORIS: Primary | ICD-10-CM

## 2021-08-04 DIAGNOSIS — R07.9 CHEST PAIN: ICD-10-CM

## 2021-08-04 DIAGNOSIS — Z01.818 PRE-OP TESTING: ICD-10-CM

## 2021-08-04 DIAGNOSIS — I25.10 CAD (CORONARY ARTERY DISEASE): ICD-10-CM

## 2021-08-04 LAB
ALBUMIN SERPL BCP-MCNC: 4 G/DL (ref 3.5–5.2)
ALP SERPL-CCNC: 63 U/L (ref 55–135)
ALT SERPL W/O P-5'-P-CCNC: 187 U/L (ref 10–44)
ANION GAP SERPL CALC-SCNC: 12 MMOL/L (ref 8–16)
AST SERPL-CCNC: 148 U/L (ref 10–40)
BASOPHILS # BLD AUTO: 0.06 K/UL (ref 0–0.2)
BASOPHILS NFR BLD: 0.8 % (ref 0–1.9)
BILIRUB SERPL-MCNC: 1.4 MG/DL (ref 0.1–1)
BNP SERPL-MCNC: 59 PG/ML (ref 0–99)
BUN SERPL-MCNC: 15 MG/DL (ref 6–20)
CALCIUM SERPL-MCNC: 9.2 MG/DL (ref 8.7–10.5)
CHLORIDE SERPL-SCNC: 102 MMOL/L (ref 95–110)
CO2 SERPL-SCNC: 24 MMOL/L (ref 23–29)
CREAT SERPL-MCNC: 1.1 MG/DL (ref 0.5–1.4)
DIFFERENTIAL METHOD: ABNORMAL
EOSINOPHIL # BLD AUTO: 0.1 K/UL (ref 0–0.5)
EOSINOPHIL NFR BLD: 1.1 % (ref 0–8)
ERYTHROCYTE [DISTWIDTH] IN BLOOD BY AUTOMATED COUNT: 12.7 % (ref 11.5–14.5)
EST. GFR  (AFRICAN AMERICAN): >60 ML/MIN/1.73 M^2
EST. GFR  (NON AFRICAN AMERICAN): >60 ML/MIN/1.73 M^2
GLUCOSE SERPL-MCNC: 117 MG/DL (ref 70–110)
HCT VFR BLD AUTO: 44.4 % (ref 40–54)
HGB BLD-MCNC: 15.3 G/DL (ref 14–18)
IMM GRANULOCYTES # BLD AUTO: 0.01 K/UL (ref 0–0.04)
IMM GRANULOCYTES NFR BLD AUTO: 0.1 % (ref 0–0.5)
INR PPP: 1
LYMPHOCYTES # BLD AUTO: 3.3 K/UL (ref 1–4.8)
LYMPHOCYTES NFR BLD: 46.8 % (ref 18–48)
MCH RBC QN AUTO: 32.2 PG (ref 27–31)
MCHC RBC AUTO-ENTMCNC: 34.5 G/DL (ref 32–36)
MCV RBC AUTO: 94 FL (ref 82–98)
MONOCYTES # BLD AUTO: 1 K/UL (ref 0.3–1)
MONOCYTES NFR BLD: 13.6 % (ref 4–15)
NEUTROPHILS # BLD AUTO: 2.7 K/UL (ref 1.8–7.7)
NEUTROPHILS NFR BLD: 37.6 % (ref 38–73)
NRBC BLD-RTO: 0 /100 WBC
PLATELET # BLD AUTO: 222 K/UL (ref 150–450)
PMV BLD AUTO: 11 FL (ref 9.2–12.9)
POTASSIUM SERPL-SCNC: 3.7 MMOL/L (ref 3.5–5.1)
PROT SERPL-MCNC: 8 G/DL (ref 6–8.4)
PROTHROMBIN TIME: 13 SEC (ref 11.8–14.3)
RBC # BLD AUTO: 4.75 M/UL (ref 4.6–6.2)
SARS-COV-2 RDRP RESP QL NAA+PROBE: NEGATIVE
SODIUM SERPL-SCNC: 138 MMOL/L (ref 136–145)
TROPONIN I SERPL DL<=0.01 NG/ML-MCNC: 0.86 NG/ML
TROPONIN I SERPL DL<=0.01 NG/ML-MCNC: 1.11 NG/ML
WBC # BLD AUTO: 7.08 K/UL (ref 3.9–12.7)

## 2021-08-04 PROCEDURE — 93010 ELECTROCARDIOGRAM REPORT: CPT | Mod: ,,, | Performed by: GENERAL PRACTICE

## 2021-08-04 PROCEDURE — 99291 CRITICAL CARE FIRST HOUR: CPT

## 2021-08-04 PROCEDURE — 99223 PR INITIAL HOSPITAL CARE,LEVL III: ICD-10-PCS | Mod: ,,, | Performed by: INTERNAL MEDICINE

## 2021-08-04 PROCEDURE — 36415 COLL VENOUS BLD VENIPUNCTURE: CPT | Performed by: INTERNAL MEDICINE

## 2021-08-04 PROCEDURE — 25000003 PHARM REV CODE 250: Performed by: EMERGENCY MEDICINE

## 2021-08-04 PROCEDURE — 85610 PROTHROMBIN TIME: CPT | Performed by: NURSE PRACTITIONER

## 2021-08-04 PROCEDURE — 25000003 PHARM REV CODE 250: Performed by: STUDENT IN AN ORGANIZED HEALTH CARE EDUCATION/TRAINING PROGRAM

## 2021-08-04 PROCEDURE — 83880 ASSAY OF NATRIURETIC PEPTIDE: CPT | Performed by: NURSE PRACTITIONER

## 2021-08-04 PROCEDURE — 85025 COMPLETE CBC W/AUTO DIFF WBC: CPT | Performed by: NURSE PRACTITIONER

## 2021-08-04 PROCEDURE — 99223 1ST HOSP IP/OBS HIGH 75: CPT | Mod: ,,, | Performed by: INTERNAL MEDICINE

## 2021-08-04 PROCEDURE — U0002 COVID-19 LAB TEST NON-CDC: HCPCS | Performed by: NURSE PRACTITIONER

## 2021-08-04 PROCEDURE — 84484 ASSAY OF TROPONIN QUANT: CPT | Mod: 91 | Performed by: INTERNAL MEDICINE

## 2021-08-04 PROCEDURE — 94761 N-INVAS EAR/PLS OXIMETRY MLT: CPT

## 2021-08-04 PROCEDURE — 80053 COMPREHEN METABOLIC PANEL: CPT | Performed by: NURSE PRACTITIONER

## 2021-08-04 PROCEDURE — 96374 THER/PROPH/DIAG INJ IV PUSH: CPT

## 2021-08-04 PROCEDURE — 84484 ASSAY OF TROPONIN QUANT: CPT | Performed by: NURSE PRACTITIONER

## 2021-08-04 PROCEDURE — 63600175 PHARM REV CODE 636 W HCPCS: Performed by: EMERGENCY MEDICINE

## 2021-08-04 PROCEDURE — 93010 EKG 12-LEAD: ICD-10-PCS | Mod: ,,, | Performed by: GENERAL PRACTICE

## 2021-08-04 PROCEDURE — 93005 ELECTROCARDIOGRAM TRACING: CPT | Performed by: GENERAL PRACTICE

## 2021-08-04 PROCEDURE — 21000000 HC CCU ICU ROOM CHARGE

## 2021-08-04 PROCEDURE — 25000003 PHARM REV CODE 250: Performed by: NURSE PRACTITIONER

## 2021-08-04 RX ORDER — ASPIRIN 325 MG
325 TABLET ORAL
Status: COMPLETED | OUTPATIENT
Start: 2021-08-04 | End: 2021-08-04

## 2021-08-04 RX ORDER — ACETAMINOPHEN 325 MG/1
650 TABLET ORAL EVERY 8 HOURS PRN
Status: DISCONTINUED | OUTPATIENT
Start: 2021-08-04 | End: 2021-08-05

## 2021-08-04 RX ORDER — ONDANSETRON 2 MG/ML
4 INJECTION INTRAMUSCULAR; INTRAVENOUS EVERY 8 HOURS PRN
Status: DISCONTINUED | OUTPATIENT
Start: 2021-08-04 | End: 2021-08-05

## 2021-08-04 RX ORDER — DIPHENHYDRAMINE HCL 25 MG
50 CAPSULE ORAL
Status: DISCONTINUED | OUTPATIENT
Start: 2021-08-04 | End: 2021-08-06 | Stop reason: HOSPADM

## 2021-08-04 RX ORDER — HYDROCODONE BITARTRATE AND ACETAMINOPHEN 5; 325 MG/1; MG/1
1 TABLET ORAL EVERY 4 HOURS PRN
Status: DISCONTINUED | OUTPATIENT
Start: 2021-08-04 | End: 2021-08-05

## 2021-08-04 RX ORDER — METOPROLOL TARTRATE 1 MG/ML
5 INJECTION, SOLUTION INTRAVENOUS
Status: COMPLETED | OUTPATIENT
Start: 2021-08-04 | End: 2021-08-04

## 2021-08-04 RX ORDER — NITROGLYCERIN 20 MG/100ML
0-400 INJECTION INTRAVENOUS CONTINUOUS
Status: DISCONTINUED | OUTPATIENT
Start: 2021-08-04 | End: 2021-08-06 | Stop reason: HOSPADM

## 2021-08-04 RX ORDER — ENOXAPARIN SODIUM 100 MG/ML
1 INJECTION SUBCUTANEOUS
Status: COMPLETED | OUTPATIENT
Start: 2021-08-04 | End: 2021-08-04

## 2021-08-04 RX ORDER — TALC
6 POWDER (GRAM) TOPICAL NIGHTLY PRN
Status: DISCONTINUED | OUTPATIENT
Start: 2021-08-04 | End: 2021-08-06 | Stop reason: HOSPADM

## 2021-08-04 RX ORDER — JNJ-78436735 50000000000 [PFU]/.5ML
0.5 SUSPENSION INTRAMUSCULAR ONCE
Status: ON HOLD | COMMUNITY
End: 2021-08-06 | Stop reason: HOSPADM

## 2021-08-04 RX ORDER — HEPARIN SODIUM 5000 [USP'U]/ML
5000 INJECTION, SOLUTION INTRAVENOUS; SUBCUTANEOUS ONCE
Status: COMPLETED | OUTPATIENT
Start: 2021-08-05 | End: 2021-08-05

## 2021-08-04 RX ADMIN — ASPIRIN 325 MG ORAL TABLET 325 MG: 325 PILL ORAL at 05:08

## 2021-08-04 RX ADMIN — NITROGLYCERIN 1 INCH: 20 OINTMENT TOPICAL at 05:08

## 2021-08-04 RX ADMIN — METOPROLOL TARTRATE 5 MG: 5 INJECTION, SOLUTION INTRAVENOUS at 05:08

## 2021-08-04 RX ADMIN — ENOXAPARIN SODIUM 70 MG: 80 INJECTION SUBCUTANEOUS at 08:08

## 2021-08-04 RX ADMIN — NITROGLYCERIN 5 MCG/MIN: 20 INJECTION INTRAVENOUS at 08:08

## 2021-08-05 LAB
ANION GAP SERPL CALC-SCNC: 9 MMOL/L (ref 8–16)
BASOPHILS # BLD AUTO: 0.06 K/UL (ref 0–0.2)
BASOPHILS # BLD AUTO: 0.08 K/UL (ref 0–0.2)
BASOPHILS NFR BLD: 0.7 % (ref 0–1.9)
BASOPHILS NFR BLD: 1 % (ref 0–1.9)
BUN SERPL-MCNC: 19 MG/DL (ref 6–20)
CALCIUM SERPL-MCNC: 8.9 MG/DL (ref 8.7–10.5)
CHLORIDE SERPL-SCNC: 105 MMOL/L (ref 95–110)
CO2 SERPL-SCNC: 24 MMOL/L (ref 23–29)
CREAT SERPL-MCNC: 0.9 MG/DL (ref 0.5–1.4)
DIFFERENTIAL METHOD: ABNORMAL
DIFFERENTIAL METHOD: ABNORMAL
EOSINOPHIL # BLD AUTO: 0.2 K/UL (ref 0–0.5)
EOSINOPHIL # BLD AUTO: 0.2 K/UL (ref 0–0.5)
EOSINOPHIL NFR BLD: 1.9 % (ref 0–8)
EOSINOPHIL NFR BLD: 3.1 % (ref 0–8)
ERYTHROCYTE [DISTWIDTH] IN BLOOD BY AUTOMATED COUNT: 12.7 % (ref 11.5–14.5)
ERYTHROCYTE [DISTWIDTH] IN BLOOD BY AUTOMATED COUNT: 12.8 % (ref 11.5–14.5)
EST. GFR  (AFRICAN AMERICAN): >60 ML/MIN/1.73 M^2
EST. GFR  (NON AFRICAN AMERICAN): >60 ML/MIN/1.73 M^2
GLUCOSE SERPL-MCNC: 106 MG/DL (ref 70–110)
HCT VFR BLD AUTO: 40.2 % (ref 40–54)
HCT VFR BLD AUTO: 42.2 % (ref 40–54)
HGB BLD-MCNC: 13.5 G/DL (ref 14–18)
HGB BLD-MCNC: 14.2 G/DL (ref 14–18)
IMM GRANULOCYTES # BLD AUTO: 0.01 K/UL (ref 0–0.04)
IMM GRANULOCYTES # BLD AUTO: 0.02 K/UL (ref 0–0.04)
IMM GRANULOCYTES NFR BLD AUTO: 0.1 % (ref 0–0.5)
IMM GRANULOCYTES NFR BLD AUTO: 0.2 % (ref 0–0.5)
LYMPHOCYTES # BLD AUTO: 4.4 K/UL (ref 1–4.8)
LYMPHOCYTES # BLD AUTO: 4.5 K/UL (ref 1–4.8)
LYMPHOCYTES NFR BLD: 50.5 % (ref 18–48)
LYMPHOCYTES NFR BLD: 57.8 % (ref 18–48)
MCH RBC QN AUTO: 31.9 PG (ref 27–31)
MCH RBC QN AUTO: 31.9 PG (ref 27–31)
MCHC RBC AUTO-ENTMCNC: 33.6 G/DL (ref 32–36)
MCHC RBC AUTO-ENTMCNC: 33.6 G/DL (ref 32–36)
MCV RBC AUTO: 95 FL (ref 82–98)
MCV RBC AUTO: 95 FL (ref 82–98)
MONOCYTES # BLD AUTO: 0.9 K/UL (ref 0.3–1)
MONOCYTES # BLD AUTO: 0.9 K/UL (ref 0.3–1)
MONOCYTES NFR BLD: 10.5 % (ref 4–15)
MONOCYTES NFR BLD: 11.5 % (ref 4–15)
NEUTROPHILS # BLD AUTO: 2.1 K/UL (ref 1.8–7.7)
NEUTROPHILS # BLD AUTO: 3.2 K/UL (ref 1.8–7.7)
NEUTROPHILS NFR BLD: 26.5 % (ref 38–73)
NEUTROPHILS NFR BLD: 36.2 % (ref 38–73)
NRBC BLD-RTO: 0 /100 WBC
NRBC BLD-RTO: 0 /100 WBC
PLATELET # BLD AUTO: 197 K/UL (ref 150–450)
PLATELET # BLD AUTO: 214 K/UL (ref 150–450)
PMV BLD AUTO: 11.2 FL (ref 9.2–12.9)
PMV BLD AUTO: 11.4 FL (ref 9.2–12.9)
POC ACTIVATED CLOTTING TIME K: 197 SEC (ref 74–137)
POC ACTIVATED CLOTTING TIME K: 268 SEC (ref 74–137)
POTASSIUM SERPL-SCNC: 3.8 MMOL/L (ref 3.5–5.1)
RBC # BLD AUTO: 4.23 M/UL (ref 4.6–6.2)
RBC # BLD AUTO: 4.45 M/UL (ref 4.6–6.2)
SAMPLE: ABNORMAL
SAMPLE: ABNORMAL
SODIUM SERPL-SCNC: 138 MMOL/L (ref 136–145)
TROPONIN I SERPL DL<=0.01 NG/ML-MCNC: 1.06 NG/ML
WBC # BLD AUTO: 7.75 K/UL (ref 3.9–12.7)
WBC # BLD AUTO: 8.79 K/UL (ref 3.9–12.7)

## 2021-08-05 PROCEDURE — 99152 MOD SED SAME PHYS/QHP 5/>YRS: CPT | Mod: ,,, | Performed by: INTERNAL MEDICINE

## 2021-08-05 PROCEDURE — 93458 PR CATH PLACE/CORON ANGIO, IMG SUPER/INTERP,W LEFT HEART VENTRICULOGRAPHY: ICD-10-PCS | Mod: 26,,, | Performed by: INTERNAL MEDICINE

## 2021-08-05 PROCEDURE — 93010 EKG 12-LEAD: ICD-10-PCS | Mod: ,,, | Performed by: INTERNAL MEDICINE

## 2021-08-05 PROCEDURE — 99152 MOD SED SAME PHYS/QHP 5/>YRS: CPT | Performed by: INTERNAL MEDICINE

## 2021-08-05 PROCEDURE — C1887 CATHETER, GUIDING: HCPCS | Performed by: INTERNAL MEDICINE

## 2021-08-05 PROCEDURE — 36415 COLL VENOUS BLD VENIPUNCTURE: CPT | Performed by: STUDENT IN AN ORGANIZED HEALTH CARE EDUCATION/TRAINING PROGRAM

## 2021-08-05 PROCEDURE — 25000003 PHARM REV CODE 250: Performed by: INTERNAL MEDICINE

## 2021-08-05 PROCEDURE — 85025 COMPLETE CBC W/AUTO DIFF WBC: CPT | Performed by: INTERNAL MEDICINE

## 2021-08-05 PROCEDURE — 99232 SBSQ HOSP IP/OBS MODERATE 35: CPT | Mod: ,,, | Performed by: INTERNAL MEDICINE

## 2021-08-05 PROCEDURE — 99900035 HC TECH TIME PER 15 MIN (STAT)

## 2021-08-05 PROCEDURE — 85025 COMPLETE CBC W/AUTO DIFF WBC: CPT | Mod: 91 | Performed by: STUDENT IN AN ORGANIZED HEALTH CARE EDUCATION/TRAINING PROGRAM

## 2021-08-05 PROCEDURE — C1769 GUIDE WIRE: HCPCS | Performed by: INTERNAL MEDICINE

## 2021-08-05 PROCEDURE — 21000000 HC CCU ICU ROOM CHARGE

## 2021-08-05 PROCEDURE — 93458 L HRT ARTERY/VENTRICLE ANGIO: CPT | Performed by: INTERNAL MEDICINE

## 2021-08-05 PROCEDURE — 94761 N-INVAS EAR/PLS OXIMETRY MLT: CPT

## 2021-08-05 PROCEDURE — 27800903 OPTIME MED/SURG SUP & DEVICES OTHER IMPLANTS: Performed by: INTERNAL MEDICINE

## 2021-08-05 PROCEDURE — 36415 COLL VENOUS BLD VENIPUNCTURE: CPT | Performed by: INTERNAL MEDICINE

## 2021-08-05 PROCEDURE — 93010 ELECTROCARDIOGRAM REPORT: CPT | Mod: ,,, | Performed by: INTERNAL MEDICINE

## 2021-08-05 PROCEDURE — C1760 CLOSURE DEV, VASC: HCPCS | Performed by: INTERNAL MEDICINE

## 2021-08-05 PROCEDURE — 99232 PR SUBSEQUENT HOSPITAL CARE,LEVL II: ICD-10-PCS | Mod: ,,, | Performed by: INTERNAL MEDICINE

## 2021-08-05 PROCEDURE — 93005 ELECTROCARDIOGRAM TRACING: CPT | Performed by: INTERNAL MEDICINE

## 2021-08-05 PROCEDURE — 93458 L HRT ARTERY/VENTRICLE ANGIO: CPT | Mod: 26,,, | Performed by: INTERNAL MEDICINE

## 2021-08-05 PROCEDURE — 99152 PR MOD CONSCIOUS SEDATION, SAME PHYS, 5+ YRS, FIRST 15 MIN: ICD-10-PCS | Mod: ,,, | Performed by: INTERNAL MEDICINE

## 2021-08-05 PROCEDURE — 84484 ASSAY OF TROPONIN QUANT: CPT | Performed by: INTERNAL MEDICINE

## 2021-08-05 PROCEDURE — 99153 MOD SED SAME PHYS/QHP EA: CPT | Performed by: INTERNAL MEDICINE

## 2021-08-05 PROCEDURE — 63600175 PHARM REV CODE 636 W HCPCS: Performed by: INTERNAL MEDICINE

## 2021-08-05 PROCEDURE — 80048 BASIC METABOLIC PNL TOTAL CA: CPT | Performed by: INTERNAL MEDICINE

## 2021-08-05 PROCEDURE — C1894 INTRO/SHEATH, NON-LASER: HCPCS | Performed by: INTERNAL MEDICINE

## 2021-08-05 DEVICE — ANGIO-SEAL VIP VASCULAR CLOSURE DEVICE
Type: IMPLANTABLE DEVICE | Site: GROIN | Status: FUNCTIONAL
Brand: ANGIO-SEAL

## 2021-08-05 RX ORDER — ONDANSETRON 4 MG/1
8 TABLET, ORALLY DISINTEGRATING ORAL EVERY 8 HOURS PRN
Status: DISCONTINUED | OUTPATIENT
Start: 2021-08-05 | End: 2021-08-06 | Stop reason: HOSPADM

## 2021-08-05 RX ORDER — MIDAZOLAM HYDROCHLORIDE 1 MG/ML
INJECTION INTRAMUSCULAR; INTRAVENOUS
Status: DISCONTINUED | OUTPATIENT
Start: 2021-08-05 | End: 2021-08-06 | Stop reason: HOSPADM

## 2021-08-05 RX ORDER — ONDANSETRON 2 MG/ML
4 INJECTION INTRAMUSCULAR; INTRAVENOUS EVERY 8 HOURS PRN
Status: DISCONTINUED | OUTPATIENT
Start: 2021-08-05 | End: 2021-08-06 | Stop reason: HOSPADM

## 2021-08-05 RX ORDER — CLOPIDOGREL BISULFATE 75 MG/1
75 TABLET ORAL DAILY
Status: DISCONTINUED | OUTPATIENT
Start: 2021-08-06 | End: 2021-08-06 | Stop reason: HOSPADM

## 2021-08-05 RX ORDER — FENTANYL CITRATE 50 UG/ML
INJECTION, SOLUTION INTRAMUSCULAR; INTRAVENOUS
Status: DISCONTINUED | OUTPATIENT
Start: 2021-08-05 | End: 2021-08-06 | Stop reason: HOSPADM

## 2021-08-05 RX ORDER — HEPARIN SODIUM 10000 [USP'U]/ML
INJECTION, SOLUTION INTRAVENOUS; SUBCUTANEOUS
Status: DISCONTINUED | OUTPATIENT
Start: 2021-08-05 | End: 2021-08-06 | Stop reason: HOSPADM

## 2021-08-05 RX ORDER — LIDOCAINE HYDROCHLORIDE 10 MG/ML
INJECTION, SOLUTION EPIDURAL; INFILTRATION; INTRACAUDAL; PERINEURAL
Status: DISCONTINUED | OUTPATIENT
Start: 2021-08-05 | End: 2021-08-06 | Stop reason: HOSPADM

## 2021-08-05 RX ORDER — NITROGLYCERIN 0.4 MG/1
0.4 TABLET SUBLINGUAL EVERY 5 MIN PRN
Status: DISCONTINUED | OUTPATIENT
Start: 2021-08-05 | End: 2021-08-06 | Stop reason: HOSPADM

## 2021-08-05 RX ORDER — ASPIRIN 325 MG
325 TABLET ORAL DAILY
Status: DISCONTINUED | OUTPATIENT
Start: 2021-08-05 | End: 2021-08-06 | Stop reason: HOSPADM

## 2021-08-05 RX ORDER — CLOPIDOGREL BISULFATE 75 MG/1
TABLET ORAL
Status: DISCONTINUED | OUTPATIENT
Start: 2021-08-05 | End: 2021-08-06 | Stop reason: HOSPADM

## 2021-08-05 RX ORDER — ACETAMINOPHEN 325 MG/1
650 TABLET ORAL EVERY 4 HOURS PRN
Status: DISCONTINUED | OUTPATIENT
Start: 2021-08-05 | End: 2021-08-06 | Stop reason: HOSPADM

## 2021-08-05 RX ORDER — HYDROCODONE BITARTRATE AND ACETAMINOPHEN 5; 325 MG/1; MG/1
1 TABLET ORAL EVERY 4 HOURS PRN
Status: DISCONTINUED | OUTPATIENT
Start: 2021-08-05 | End: 2021-08-06 | Stop reason: HOSPADM

## 2021-08-05 RX ORDER — SODIUM CHLORIDE 450 MG/100ML
INJECTION, SOLUTION INTRAVENOUS CONTINUOUS
Status: ACTIVE | OUTPATIENT
Start: 2021-08-05 | End: 2021-08-05

## 2021-08-05 RX ADMIN — ASPIRIN 325 MG ORAL TABLET 325 MG: 325 PILL ORAL at 01:08

## 2021-08-05 RX ADMIN — SODIUM CHLORIDE: 0.45 INJECTION, SOLUTION INTRAVENOUS at 09:08

## 2021-08-05 RX ADMIN — HEPARIN SODIUM 5000 UNITS: 5000 INJECTION INTRAVENOUS; SUBCUTANEOUS at 06:08

## 2021-08-06 VITALS
SYSTOLIC BLOOD PRESSURE: 110 MMHG | RESPIRATION RATE: 20 BRPM | WEIGHT: 157.63 LBS | HEART RATE: 63 BPM | OXYGEN SATURATION: 95 % | TEMPERATURE: 98 F | HEIGHT: 71 IN | DIASTOLIC BLOOD PRESSURE: 74 MMHG | BODY MASS INDEX: 22.07 KG/M2

## 2021-08-06 LAB
ANION GAP SERPL CALC-SCNC: 6 MMOL/L (ref 8–16)
BASOPHILS # BLD AUTO: 0.04 K/UL (ref 0–0.2)
BASOPHILS NFR BLD: 0.6 % (ref 0–1.9)
BUN SERPL-MCNC: 13 MG/DL (ref 6–20)
CALCIUM SERPL-MCNC: 8.8 MG/DL (ref 8.7–10.5)
CHLORIDE SERPL-SCNC: 102 MMOL/L (ref 95–110)
CO2 SERPL-SCNC: 24 MMOL/L (ref 23–29)
CREAT SERPL-MCNC: 0.7 MG/DL (ref 0.5–1.4)
DIFFERENTIAL METHOD: ABNORMAL
EOSINOPHIL # BLD AUTO: 0.1 K/UL (ref 0–0.5)
EOSINOPHIL NFR BLD: 1.8 % (ref 0–8)
ERYTHROCYTE [DISTWIDTH] IN BLOOD BY AUTOMATED COUNT: 12.6 % (ref 11.5–14.5)
EST. GFR  (AFRICAN AMERICAN): >60 ML/MIN/1.73 M^2
EST. GFR  (NON AFRICAN AMERICAN): >60 ML/MIN/1.73 M^2
GLUCOSE SERPL-MCNC: 98 MG/DL (ref 70–110)
HCT VFR BLD AUTO: 42.7 % (ref 40–54)
HGB BLD-MCNC: 14.4 G/DL (ref 14–18)
IMM GRANULOCYTES # BLD AUTO: 0.01 K/UL (ref 0–0.04)
IMM GRANULOCYTES NFR BLD AUTO: 0.1 % (ref 0–0.5)
LYMPHOCYTES # BLD AUTO: 3.8 K/UL (ref 1–4.8)
LYMPHOCYTES NFR BLD: 52.3 % (ref 18–48)
MAGNESIUM SERPL-MCNC: 2 MG/DL (ref 1.6–2.6)
MCH RBC QN AUTO: 32.4 PG (ref 27–31)
MCHC RBC AUTO-ENTMCNC: 33.7 G/DL (ref 32–36)
MCV RBC AUTO: 96 FL (ref 82–98)
MONOCYTES # BLD AUTO: 0.7 K/UL (ref 0.3–1)
MONOCYTES NFR BLD: 10 % (ref 4–15)
NEUTROPHILS # BLD AUTO: 2.5 K/UL (ref 1.8–7.7)
NEUTROPHILS NFR BLD: 35.2 % (ref 38–73)
NRBC BLD-RTO: 0 /100 WBC
PLATELET # BLD AUTO: 205 K/UL (ref 150–450)
PMV BLD AUTO: 11.3 FL (ref 9.2–12.9)
POTASSIUM SERPL-SCNC: 3.9 MMOL/L (ref 3.5–5.1)
RBC # BLD AUTO: 4.45 M/UL (ref 4.6–6.2)
SODIUM SERPL-SCNC: 132 MMOL/L (ref 136–145)
WBC # BLD AUTO: 7.21 K/UL (ref 3.9–12.7)

## 2021-08-06 PROCEDURE — 36415 COLL VENOUS BLD VENIPUNCTURE: CPT | Performed by: INTERNAL MEDICINE

## 2021-08-06 PROCEDURE — 25000003 PHARM REV CODE 250: Performed by: INTERNAL MEDICINE

## 2021-08-06 PROCEDURE — 83735 ASSAY OF MAGNESIUM: CPT | Performed by: STUDENT IN AN ORGANIZED HEALTH CARE EDUCATION/TRAINING PROGRAM

## 2021-08-06 PROCEDURE — 25000003 PHARM REV CODE 250: Performed by: NURSE PRACTITIONER

## 2021-08-06 PROCEDURE — 99233 SBSQ HOSP IP/OBS HIGH 50: CPT | Mod: ,,, | Performed by: INTERNAL MEDICINE

## 2021-08-06 PROCEDURE — 85025 COMPLETE CBC W/AUTO DIFF WBC: CPT | Performed by: INTERNAL MEDICINE

## 2021-08-06 PROCEDURE — 80048 BASIC METABOLIC PNL TOTAL CA: CPT | Performed by: INTERNAL MEDICINE

## 2021-08-06 PROCEDURE — 99233 PR SUBSEQUENT HOSPITAL CARE,LEVL III: ICD-10-PCS | Mod: ,,, | Performed by: INTERNAL MEDICINE

## 2021-08-06 RX ORDER — METOPROLOL SUCCINATE 25 MG/1
12.5 TABLET, EXTENDED RELEASE ORAL DAILY
Qty: 45 TABLET | Refills: 0 | Status: SHIPPED | OUTPATIENT
Start: 2021-08-07 | End: 2021-09-08 | Stop reason: SDUPTHER

## 2021-08-06 RX ORDER — ISOSORBIDE MONONITRATE 30 MG/1
30 TABLET, EXTENDED RELEASE ORAL DAILY
Qty: 90 TABLET | Refills: 0 | Status: SHIPPED | OUTPATIENT
Start: 2021-08-06 | End: 2021-09-08 | Stop reason: SDUPTHER

## 2021-08-06 RX ORDER — CLOPIDOGREL BISULFATE 75 MG/1
75 TABLET ORAL DAILY
Qty: 90 TABLET | Refills: 0 | Status: SHIPPED | OUTPATIENT
Start: 2021-08-06 | End: 2021-09-08 | Stop reason: SDUPTHER

## 2021-08-06 RX ORDER — ASPIRIN 81 MG/1
81 TABLET ORAL DAILY
Qty: 90 TABLET | Refills: 0 | Status: SHIPPED | OUTPATIENT
Start: 2021-08-06 | End: 2021-09-08 | Stop reason: SDUPTHER

## 2021-08-06 RX ORDER — ISOSORBIDE MONONITRATE 30 MG/1
30 TABLET, EXTENDED RELEASE ORAL DAILY
Status: DISCONTINUED | OUTPATIENT
Start: 2021-08-06 | End: 2021-08-06 | Stop reason: HOSPADM

## 2021-08-06 RX ADMIN — CLOPIDOGREL BISULFATE 75 MG: 75 TABLET, FILM COATED ORAL at 08:08

## 2021-08-06 RX ADMIN — ASPIRIN 325 MG ORAL TABLET 325 MG: 325 PILL ORAL at 08:08

## 2021-08-06 RX ADMIN — METOPROLOL SUCCINATE 12.5 MG: 25 TABLET, EXTENDED RELEASE ORAL at 11:08

## 2021-08-24 LAB — CATH EF QUANTITATIVE: 60 %

## 2021-09-08 ENCOUNTER — OFFICE VISIT (OUTPATIENT)
Dept: CARDIOLOGY | Facility: CLINIC | Age: 56
End: 2021-09-08
Payer: COMMERCIAL

## 2021-09-08 ENCOUNTER — TELEPHONE (OUTPATIENT)
Dept: CARDIOLOGY | Facility: CLINIC | Age: 56
End: 2021-09-08

## 2021-09-08 VITALS
WEIGHT: 159 LBS | SYSTOLIC BLOOD PRESSURE: 130 MMHG | OXYGEN SATURATION: 97 % | HEART RATE: 78 BPM | HEIGHT: 71 IN | BODY MASS INDEX: 22.26 KG/M2 | DIASTOLIC BLOOD PRESSURE: 84 MMHG

## 2021-09-08 DIAGNOSIS — B18.2 HEP C W/O COMA, CHRONIC: ICD-10-CM

## 2021-09-08 DIAGNOSIS — F17.200 SMOKER: ICD-10-CM

## 2021-09-08 DIAGNOSIS — Z78.9 DAILY CONSUMPTION OF ALCOHOL: ICD-10-CM

## 2021-09-08 DIAGNOSIS — I25.10 CORONARY ARTERY DISEASE WITHOUT ANGINA PECTORIS, UNSPECIFIED VESSEL OR LESION TYPE, UNSPECIFIED WHETHER NATIVE OR TRANSPLANTED HEART: ICD-10-CM

## 2021-09-08 DIAGNOSIS — I21.4 NSTEMI (NON-ST ELEVATED MYOCARDIAL INFARCTION): Primary | ICD-10-CM

## 2021-09-08 PROCEDURE — 99214 OFFICE O/P EST MOD 30 MIN: CPT | Mod: S$GLB,,, | Performed by: INTERNAL MEDICINE

## 2021-09-08 PROCEDURE — 93005 ELECTROCARDIOGRAM TRACING: CPT | Mod: S$GLB,,, | Performed by: NURSE PRACTITIONER

## 2021-09-08 PROCEDURE — 93010 ELECTROCARDIOGRAM REPORT: CPT | Mod: S$GLB,,, | Performed by: GENERAL PRACTICE

## 2021-09-08 PROCEDURE — 93005 EKG 12-LEAD: ICD-10-PCS | Mod: S$GLB,,, | Performed by: NURSE PRACTITIONER

## 2021-09-08 PROCEDURE — 99214 PR OFFICE/OUTPT VISIT, EST, LEVL IV, 30-39 MIN: ICD-10-PCS | Mod: S$GLB,,, | Performed by: INTERNAL MEDICINE

## 2021-09-08 PROCEDURE — 93010 EKG 12-LEAD: ICD-10-PCS | Mod: S$GLB,,, | Performed by: GENERAL PRACTICE

## 2021-09-08 RX ORDER — METOPROLOL SUCCINATE 25 MG/1
25 TABLET, EXTENDED RELEASE ORAL DAILY
Qty: 90 TABLET | Refills: 3 | Status: SHIPPED | OUTPATIENT
Start: 2021-09-08 | End: 2021-09-29 | Stop reason: SDUPTHER

## 2021-09-08 RX ORDER — ISOSORBIDE MONONITRATE 30 MG/1
30 TABLET, EXTENDED RELEASE ORAL DAILY
Qty: 90 TABLET | Refills: 3 | Status: SHIPPED | OUTPATIENT
Start: 2021-09-08 | End: 2021-09-29 | Stop reason: SDUPTHER

## 2021-09-08 RX ORDER — CLOPIDOGREL BISULFATE 75 MG/1
75 TABLET ORAL DAILY
Qty: 90 TABLET | Refills: 3 | Status: SHIPPED | OUTPATIENT
Start: 2021-09-08 | End: 2021-09-29 | Stop reason: SDUPTHER

## 2021-09-08 RX ORDER — ASPIRIN 81 MG/1
81 TABLET ORAL DAILY
Qty: 90 TABLET | Refills: 3 | Status: SHIPPED | OUTPATIENT
Start: 2021-09-08 | End: 2021-09-29 | Stop reason: SDUPTHER

## 2021-09-09 ENCOUNTER — TELEPHONE (OUTPATIENT)
Dept: FAMILY MEDICINE | Facility: CLINIC | Age: 56
End: 2021-09-09

## 2021-09-10 ENCOUNTER — LAB VISIT (OUTPATIENT)
Dept: LAB | Facility: HOSPITAL | Age: 56
End: 2021-09-10
Attending: NURSE PRACTITIONER
Payer: COMMERCIAL

## 2021-09-10 ENCOUNTER — HOSPITAL ENCOUNTER (OUTPATIENT)
Dept: CARDIOLOGY | Facility: CLINIC | Age: 56
Discharge: HOME | End: 2021-09-10
Attending: NURSE PRACTITIONER
Payer: COMMERCIAL

## 2021-09-10 ENCOUNTER — OFFICE VISIT (OUTPATIENT)
Dept: FAMILY MEDICINE | Facility: CLINIC | Age: 56
End: 2021-09-10
Payer: COMMERCIAL

## 2021-09-10 VITALS
BODY MASS INDEX: 22.07 KG/M2 | HEIGHT: 71 IN | SYSTOLIC BLOOD PRESSURE: 118 MMHG | HEART RATE: 87 BPM | WEIGHT: 157.63 LBS | DIASTOLIC BLOOD PRESSURE: 84 MMHG | OXYGEN SATURATION: 97 % | RESPIRATION RATE: 18 BRPM

## 2021-09-10 VITALS — BODY MASS INDEX: 22.26 KG/M2 | HEIGHT: 71 IN | WEIGHT: 159 LBS

## 2021-09-10 DIAGNOSIS — I21.4 NSTEMI (NON-ST ELEVATED MYOCARDIAL INFARCTION): ICD-10-CM

## 2021-09-10 DIAGNOSIS — F10.90 CHRONIC ALCOHOL USE: ICD-10-CM

## 2021-09-10 DIAGNOSIS — I25.10 CORONARY ARTERY DISEASE INVOLVING NATIVE HEART, ANGINA PRESENCE UNSPECIFIED, UNSPECIFIED VESSEL OR LESION TYPE: Primary | ICD-10-CM

## 2021-09-10 DIAGNOSIS — Z12.11 ENCOUNTER FOR SCREENING COLONOSCOPY: ICD-10-CM

## 2021-09-10 DIAGNOSIS — N28.89 KIDNEY MASS: ICD-10-CM

## 2021-09-10 DIAGNOSIS — Z79.899 ON STATIN THERAPY: ICD-10-CM

## 2021-09-10 DIAGNOSIS — F17.200 TOBACCO USE DISORDER, SEVERE, DEPENDENCE: ICD-10-CM

## 2021-09-10 DIAGNOSIS — B18.2 CHRONIC HEPATITIS C WITHOUT HEPATIC COMA: ICD-10-CM

## 2021-09-10 PROCEDURE — 93306 ECHO (CUPID ONLY): ICD-10-PCS | Mod: S$GLB,,, | Performed by: INTERNAL MEDICINE

## 2021-09-10 PROCEDURE — 85027 COMPLETE CBC AUTOMATED: CPT | Performed by: NURSE PRACTITIONER

## 2021-09-10 PROCEDURE — 99999 PR PBB SHADOW E&M-EST. PATIENT-LVL V: ICD-10-PCS | Mod: PBBFAC,,, | Performed by: FAMILY MEDICINE

## 2021-09-10 PROCEDURE — 36415 COLL VENOUS BLD VENIPUNCTURE: CPT | Mod: PO | Performed by: NURSE PRACTITIONER

## 2021-09-10 PROCEDURE — 99999 PR PBB SHADOW E&M-EST. PATIENT-LVL V: CPT | Mod: PBBFAC,,, | Performed by: FAMILY MEDICINE

## 2021-09-10 PROCEDURE — 80053 COMPREHEN METABOLIC PANEL: CPT | Performed by: NURSE PRACTITIONER

## 2021-09-10 PROCEDURE — 99204 PR OFFICE/OUTPT VISIT, NEW, LEVL IV, 45-59 MIN: ICD-10-PCS | Mod: S$GLB,,, | Performed by: FAMILY MEDICINE

## 2021-09-10 PROCEDURE — 99204 OFFICE O/P NEW MOD 45 MIN: CPT | Mod: S$GLB,,, | Performed by: FAMILY MEDICINE

## 2021-09-10 PROCEDURE — 80061 LIPID PANEL: CPT | Performed by: NURSE PRACTITIONER

## 2021-09-10 PROCEDURE — 93306 TTE W/DOPPLER COMPLETE: CPT | Mod: S$GLB,,, | Performed by: INTERNAL MEDICINE

## 2021-09-10 RX ORDER — ROSUVASTATIN CALCIUM 40 MG/1
40 TABLET, COATED ORAL NIGHTLY
Qty: 90 TABLET | Refills: 3 | Status: SHIPPED | OUTPATIENT
Start: 2021-09-10 | End: 2022-09-10

## 2021-09-10 RX ORDER — UBIDECARENONE 30 MG
30 CAPSULE ORAL DAILY
Qty: 30 CAPSULE | Refills: 11 | Status: SHIPPED | OUTPATIENT
Start: 2021-09-10 | End: 2022-11-09

## 2021-09-11 LAB
ALBUMIN SERPL BCP-MCNC: 3.8 G/DL (ref 3.5–5.2)
ALP SERPL-CCNC: 66 U/L (ref 55–135)
ALT SERPL W/O P-5'-P-CCNC: 121 U/L (ref 10–44)
ANION GAP SERPL CALC-SCNC: 12 MMOL/L (ref 8–16)
AST SERPL-CCNC: 94 U/L (ref 10–40)
BILIRUB SERPL-MCNC: 1 MG/DL (ref 0.1–1)
BUN SERPL-MCNC: 14 MG/DL (ref 6–20)
CALCIUM SERPL-MCNC: 9.7 MG/DL (ref 8.7–10.5)
CHLORIDE SERPL-SCNC: 105 MMOL/L (ref 95–110)
CHOLEST SERPL-MCNC: 153 MG/DL (ref 120–199)
CHOLEST/HDLC SERPL: 3.1 {RATIO} (ref 2–5)
CO2 SERPL-SCNC: 22 MMOL/L (ref 23–29)
CREAT SERPL-MCNC: 0.8 MG/DL (ref 0.5–1.4)
ERYTHROCYTE [DISTWIDTH] IN BLOOD BY AUTOMATED COUNT: 13 % (ref 11.5–14.5)
EST. GFR  (AFRICAN AMERICAN): >60 ML/MIN/1.73 M^2
EST. GFR  (NON AFRICAN AMERICAN): >60 ML/MIN/1.73 M^2
GLUCOSE SERPL-MCNC: 88 MG/DL (ref 70–110)
HCT VFR BLD AUTO: 44.6 % (ref 40–54)
HDLC SERPL-MCNC: 49 MG/DL (ref 40–75)
HDLC SERPL: 32 % (ref 20–50)
HGB BLD-MCNC: 14.8 G/DL (ref 14–18)
LDLC SERPL CALC-MCNC: 85.2 MG/DL (ref 63–159)
MCH RBC QN AUTO: 32 PG (ref 27–31)
MCHC RBC AUTO-ENTMCNC: 33.2 G/DL (ref 32–36)
MCV RBC AUTO: 97 FL (ref 82–98)
NONHDLC SERPL-MCNC: 104 MG/DL
PLATELET # BLD AUTO: 208 K/UL (ref 150–450)
PMV BLD AUTO: 11.2 FL (ref 9.2–12.9)
POTASSIUM SERPL-SCNC: 4 MMOL/L (ref 3.5–5.1)
PROT SERPL-MCNC: 7.9 G/DL (ref 6–8.4)
RBC # BLD AUTO: 4.62 M/UL (ref 4.6–6.2)
SODIUM SERPL-SCNC: 139 MMOL/L (ref 136–145)
TRIGL SERPL-MCNC: 94 MG/DL (ref 30–150)
WBC # BLD AUTO: 7.97 K/UL (ref 3.9–12.7)

## 2021-09-14 ENCOUNTER — OFFICE VISIT (OUTPATIENT)
Dept: FAMILY MEDICINE | Facility: CLINIC | Age: 56
End: 2021-09-14
Payer: COMMERCIAL

## 2021-09-14 VITALS
HEART RATE: 88 BPM | WEIGHT: 156 LBS | BODY MASS INDEX: 21.84 KG/M2 | DIASTOLIC BLOOD PRESSURE: 75 MMHG | OXYGEN SATURATION: 98 % | SYSTOLIC BLOOD PRESSURE: 114 MMHG | HEIGHT: 71 IN | TEMPERATURE: 98 F

## 2021-09-14 DIAGNOSIS — M54.2 NECK PAIN, MUSCULOSKELETAL: Primary | ICD-10-CM

## 2021-09-14 DIAGNOSIS — M62.838 MUSCLE SPASM: ICD-10-CM

## 2021-09-14 PROCEDURE — 99214 OFFICE O/P EST MOD 30 MIN: CPT | Mod: 25,S$GLB,, | Performed by: NURSE PRACTITIONER

## 2021-09-14 PROCEDURE — 99214 PR OFFICE/OUTPT VISIT, EST, LEVL IV, 30-39 MIN: ICD-10-PCS | Mod: 25,S$GLB,, | Performed by: NURSE PRACTITIONER

## 2021-09-14 PROCEDURE — 99999 PR PBB SHADOW E&M-EST. PATIENT-LVL IV: CPT | Mod: PBBFAC,,, | Performed by: NURSE PRACTITIONER

## 2021-09-14 PROCEDURE — 96372 PR INJECTION,THERAP/PROPH/DIAG2ST, IM OR SUBCUT: ICD-10-PCS | Mod: S$GLB,,, | Performed by: NURSE PRACTITIONER

## 2021-09-14 PROCEDURE — 96372 THER/PROPH/DIAG INJ SC/IM: CPT | Mod: S$GLB,,, | Performed by: NURSE PRACTITIONER

## 2021-09-14 PROCEDURE — 99999 PR PBB SHADOW E&M-EST. PATIENT-LVL IV: ICD-10-PCS | Mod: PBBFAC,,, | Performed by: NURSE PRACTITIONER

## 2021-09-14 RX ORDER — DEXAMETHASONE SODIUM PHOSPHATE 4 MG/ML
8 INJECTION, SOLUTION INTRA-ARTICULAR; INTRALESIONAL; INTRAMUSCULAR; INTRAVENOUS; SOFT TISSUE
Status: COMPLETED | OUTPATIENT
Start: 2021-09-14 | End: 2021-09-14

## 2021-09-14 RX ORDER — TRAMADOL HYDROCHLORIDE 50 MG/1
50 TABLET ORAL EVERY 6 HOURS PRN
Qty: 20 TABLET | Refills: 0 | Status: SHIPPED | OUTPATIENT
Start: 2021-09-14 | End: 2021-09-21

## 2021-09-14 RX ORDER — METHYLPREDNISOLONE 4 MG/1
TABLET ORAL
Qty: 1 PACKAGE | Refills: 0 | Status: SHIPPED | OUTPATIENT
Start: 2021-09-15 | End: 2021-10-05

## 2021-09-14 RX ORDER — DEXAMETHASONE SODIUM PHOSPHATE 4 MG/ML
4 INJECTION, SOLUTION INTRA-ARTICULAR; INTRALESIONAL; INTRAMUSCULAR; INTRAVENOUS; SOFT TISSUE
Status: DISCONTINUED | OUTPATIENT
Start: 2021-09-14 | End: 2021-09-14

## 2021-09-14 RX ORDER — TIZANIDINE 4 MG/1
4 TABLET ORAL EVERY 6 HOURS PRN
Qty: 30 TABLET | Refills: 0 | Status: SHIPPED | OUTPATIENT
Start: 2021-09-14 | End: 2021-09-24

## 2021-09-14 RX ADMIN — DEXAMETHASONE SODIUM PHOSPHATE 8 MG: 4 INJECTION, SOLUTION INTRA-ARTICULAR; INTRALESIONAL; INTRAMUSCULAR; INTRAVENOUS; SOFT TISSUE at 09:09

## 2021-09-18 ENCOUNTER — HOSPITAL ENCOUNTER (OUTPATIENT)
Dept: RADIOLOGY | Facility: HOSPITAL | Age: 56
Discharge: HOME | End: 2021-09-18
Attending: FAMILY MEDICINE
Payer: COMMERCIAL

## 2021-09-18 DIAGNOSIS — N28.89 KIDNEY MASS: ICD-10-CM

## 2021-09-18 PROCEDURE — 76770 US EXAM ABDO BACK WALL COMP: CPT | Mod: TC

## 2021-09-20 ENCOUNTER — PATIENT MESSAGE (OUTPATIENT)
Dept: FAMILY MEDICINE | Facility: CLINIC | Age: 56
End: 2021-09-20

## 2021-09-20 DIAGNOSIS — N28.89 KIDNEY MASS: Primary | ICD-10-CM

## 2021-09-20 DIAGNOSIS — Q61.9 CYSTIC KIDNEY DISEASE, UNSPECIFIED: ICD-10-CM

## 2021-09-21 ENCOUNTER — TELEPHONE (OUTPATIENT)
Dept: FAMILY MEDICINE | Facility: CLINIC | Age: 56
End: 2021-09-21

## 2021-09-22 LAB
AORTIC ROOT ANNULUS: 4.2 CM
AORTIC VALVE CUSP SEPERATION: 2.2 CM
AV INDEX (PROSTH): 0.94
AV MEAN GRADIENT: 3 MMHG
AV PEAK GRADIENT: 5 MMHG
AV VALVE AREA: 3.9 CM2
AV VELOCITY RATIO: 0.94
BSA FOR ECHO PROCEDURE: 1.9 M2
CV ECHO LV RWT: 0.48 CM
DOP CALC AO PEAK VEL: 1.09 M/S
DOP CALC AO VTI: 25.9 CM
DOP CALC LVOT AREA: 4.2 CM2
DOP CALC LVOT DIAMETER: 2.3 CM
DOP CALC LVOT PEAK VEL: 1.02 M/S
DOP CALC LVOT STROKE VOLUME: 100.91 CM3
DOP CALCLVOT PEAK VEL VTI: 24.3 CM
E WAVE DECELERATION TIME: 306 MS
E/A RATIO: 0.96
E/E' RATIO: 6.48 M/S
ECHO LV POSTERIOR WALL: 0.97 CM (ref 0.6–1.1)
EJECTION FRACTION: 60 %
FRACTIONAL SHORTENING: 32 % (ref 28–44)
INTERVENTRICULAR SEPTUM: 1.18 CM (ref 0.6–1.1)
IVRT: 116 MS
LA MAJOR: 3.2 CM
LEFT ATRIUM SIZE: 3.6 CM
LEFT INTERNAL DIMENSION IN SYSTOLE: 2.73 CM (ref 2.1–4)
LEFT VENTRICLE MASS INDEX: 74 G/M2
LEFT VENTRICULAR INTERNAL DIMENSION IN DIASTOLE: 4.02 CM (ref 3.5–6)
LEFT VENTRICULAR MASS: 141.97 G
LV LATERAL E/E' RATIO: 4.86 M/S
LV SEPTAL E/E' RATIO: 9.71 M/S
MV PEAK A VEL: 0.71 M/S
MV PEAK E VEL: 0.68 M/S
PISA TR MAX VEL: 1.98 M/S
RIGHT VENTRICULAR END-DIASTOLIC DIMENSION: 2.52 CM
TDI LATERAL: 0.14 M/S
TDI SEPTAL: 0.07 M/S
TDI: 0.11 M/S
TR MAX PG: 16 MMHG

## 2021-09-28 NOTE — TELEPHONE ENCOUNTER
----- Message from Simon Pablo sent at 9/28/2021  3:57 PM CDT -----  Regarding: medications  aspirin (ECOTRIN) 81 MG EC tablet 90 tablet 3 9/8/2021 12/7/2021 No  Sig - Route: Take 1 tablet (81 mg total) by mouth once daily. - Oral    clopidogreL (PLAVIX) 75 mg tablet 90 tablet 3 9/8/2021 12/7/2021 No  Sig - Route: Take 1 tablet (75 mg total) by mouth once daily. - Oral    isosorbide mononitrate (IMDUR) 30 MG 24 hr tablet 90 tablet 3 9/8/2021 12/7/2021 No  Sig - Route: Take 1 tablet (30 mg total) by mouth once daily. - Oral      metoprolol succinate (TOPROL-XL) 25 MG 24 hr tablet 90 tablet 3 9/8/2021 12/7/2021 No  Sig - Route: Take 1 tablet (25 mg total) by mouth once daily. - Oral    >>>pt said pharmacy said they dont have these medications     [id number] - [place], [place] - [facility] [facility]

## 2021-09-29 RX ORDER — ISOSORBIDE MONONITRATE 30 MG/1
30 TABLET, EXTENDED RELEASE ORAL DAILY
Qty: 90 TABLET | Refills: 3 | Status: SHIPPED | OUTPATIENT
Start: 2021-09-29 | End: 2021-11-09 | Stop reason: SDUPTHER

## 2021-09-29 RX ORDER — ASPIRIN 81 MG/1
81 TABLET ORAL DAILY
Qty: 90 TABLET | Refills: 3 | Status: SHIPPED | OUTPATIENT
Start: 2021-09-29 | End: 2021-11-09 | Stop reason: SDUPTHER

## 2021-09-29 RX ORDER — METOPROLOL SUCCINATE 25 MG/1
25 TABLET, EXTENDED RELEASE ORAL DAILY
Qty: 90 TABLET | Refills: 3 | Status: SHIPPED | OUTPATIENT
Start: 2021-09-29 | End: 2022-02-15 | Stop reason: SDUPTHER

## 2021-09-29 RX ORDER — CLOPIDOGREL BISULFATE 75 MG/1
75 TABLET ORAL DAILY
Qty: 90 TABLET | Refills: 3 | Status: SHIPPED | OUTPATIENT
Start: 2021-09-29 | End: 2022-02-15 | Stop reason: SDUPTHER

## 2021-10-11 ENCOUNTER — PATIENT OUTREACH (OUTPATIENT)
Dept: ADMINISTRATIVE | Facility: OTHER | Age: 56
End: 2021-10-11

## 2021-10-13 ENCOUNTER — TELEPHONE (OUTPATIENT)
Dept: UROLOGY | Facility: CLINIC | Age: 56
End: 2021-10-13

## 2021-11-09 RX ORDER — ASPIRIN 81 MG/1
81 TABLET ORAL DAILY
Qty: 90 TABLET | Refills: 3 | Status: SHIPPED | OUTPATIENT
Start: 2021-11-09 | End: 2022-06-08 | Stop reason: SDUPTHER

## 2021-11-09 RX ORDER — ISOSORBIDE MONONITRATE 30 MG/1
30 TABLET, EXTENDED RELEASE ORAL DAILY
Qty: 90 TABLET | Refills: 3 | Status: SHIPPED | OUTPATIENT
Start: 2021-11-09 | End: 2022-02-15 | Stop reason: SDUPTHER

## 2021-11-09 NOTE — TELEPHONE ENCOUNTER
----- Message from Bari Cardona sent at 11/9/2021  3:05 PM CST -----  Regarding: refill  Contact: Patient  Type:  RX Refill Request    Who Called:  Patient   Refill or New Rx:  Refill  RX Name and Strength:  isosorbide 30mg and asprin  How is the patient currently taking it? (ex. 1XDay):  as needed  Is this a 30 day or 90 day RX:  30day  Preferred Pharmacy with phone number:    [facility] #[id number] [address] & [address] [address]  Phone: [phone] Fax: [phone]    Local or Mail Order:  Local  Ordering Provider:  Ariella Noriega  Best Call Back Number:  [phone] (home)

## 2021-11-11 ENCOUNTER — OFFICE VISIT (OUTPATIENT)
Dept: FAMILY MEDICINE | Facility: CLINIC | Age: 56
End: 2021-11-11
Payer: COMMERCIAL

## 2021-11-11 ENCOUNTER — LAB VISIT (OUTPATIENT)
Dept: LAB | Facility: HOSPITAL | Age: 56
End: 2021-11-11
Attending: FAMILY MEDICINE
Payer: COMMERCIAL

## 2021-11-11 VITALS
DIASTOLIC BLOOD PRESSURE: 84 MMHG | BODY MASS INDEX: 22.07 KG/M2 | WEIGHT: 157.63 LBS | OXYGEN SATURATION: 96 % | HEART RATE: 88 BPM | SYSTOLIC BLOOD PRESSURE: 120 MMHG | HEIGHT: 71 IN

## 2021-11-11 DIAGNOSIS — Z11.59 NEED FOR HEPATITIS C SCREENING TEST: ICD-10-CM

## 2021-11-11 DIAGNOSIS — Z12.2 SCREENING FOR LUNG CANCER: Primary | ICD-10-CM

## 2021-11-11 DIAGNOSIS — Z11.4 SCREENING FOR HIV (HUMAN IMMUNODEFICIENCY VIRUS): ICD-10-CM

## 2021-11-11 DIAGNOSIS — R76.8 HEPATITIS C ANTIBODY TEST POSITIVE: ICD-10-CM

## 2021-11-11 DIAGNOSIS — Z12.11 COLON CANCER SCREENING: ICD-10-CM

## 2021-11-11 DIAGNOSIS — N28.89 KIDNEY MASS: ICD-10-CM

## 2021-11-11 DIAGNOSIS — F17.200 TOBACCO USE DISORDER, SEVERE, DEPENDENCE: ICD-10-CM

## 2021-11-11 PROCEDURE — 99214 OFFICE O/P EST MOD 30 MIN: CPT | Mod: S$GLB,,, | Performed by: FAMILY MEDICINE

## 2021-11-11 PROCEDURE — 99999 PR PBB SHADOW E&M-EST. PATIENT-LVL V: CPT | Mod: PBBFAC,,, | Performed by: FAMILY MEDICINE

## 2021-11-11 PROCEDURE — 86803 HEPATITIS C AB TEST: CPT | Performed by: FAMILY MEDICINE

## 2021-11-11 PROCEDURE — 99214 PR OFFICE/OUTPT VISIT, EST, LEVL IV, 30-39 MIN: ICD-10-PCS | Mod: S$GLB,,, | Performed by: FAMILY MEDICINE

## 2021-11-11 PROCEDURE — 99999 PR PBB SHADOW E&M-EST. PATIENT-LVL V: ICD-10-PCS | Mod: PBBFAC,,, | Performed by: FAMILY MEDICINE

## 2021-11-11 PROCEDURE — 87389 HIV-1 AG W/HIV-1&-2 AB AG IA: CPT | Performed by: FAMILY MEDICINE

## 2021-11-11 PROCEDURE — 36415 COLL VENOUS BLD VENIPUNCTURE: CPT | Mod: PO | Performed by: FAMILY MEDICINE

## 2021-11-11 RX ORDER — IBUPROFEN 200 MG
1 TABLET ORAL DAILY
Qty: 90 PATCH | Refills: 1 | Status: SHIPPED | OUTPATIENT
Start: 2021-11-11 | End: 2022-05-10

## 2021-11-12 ENCOUNTER — TELEPHONE (OUTPATIENT)
Dept: UROLOGY | Facility: CLINIC | Age: 56
End: 2021-11-12
Payer: COMMERCIAL

## 2021-11-12 LAB
HCV AB SERPL QL IA: POSITIVE
HIV 1+2 AB+HIV1 P24 AG SERPL QL IA: NEGATIVE

## 2021-11-15 ENCOUNTER — HOSPITAL ENCOUNTER (OUTPATIENT)
Dept: RADIOLOGY | Facility: HOSPITAL | Age: 56
Discharge: HOME | End: 2021-11-15
Attending: FAMILY MEDICINE
Payer: COMMERCIAL

## 2021-11-15 DIAGNOSIS — Z12.2 SCREENING FOR LUNG CANCER: ICD-10-CM

## 2021-11-15 PROCEDURE — 71271 CT THORAX LUNG CANCER SCR C-: CPT | Mod: TC

## 2021-11-15 PROCEDURE — 71271 CT THORAX LUNG CANCER SCR C-: CPT | Mod: 26,,, | Performed by: RADIOLOGY

## 2021-11-15 PROCEDURE — 71271 CT CHEST LUNG SCREENING LOW DOSE: ICD-10-PCS | Mod: 26,,, | Performed by: RADIOLOGY

## 2021-11-22 ENCOUNTER — LAB VISIT (OUTPATIENT)
Dept: LAB | Facility: HOSPITAL | Age: 56
End: 2021-11-22
Attending: FAMILY MEDICINE
Payer: COMMERCIAL

## 2021-11-22 ENCOUNTER — TELEPHONE (OUTPATIENT)
Dept: FAMILY MEDICINE | Facility: CLINIC | Age: 56
End: 2021-11-22
Payer: COMMERCIAL

## 2021-11-22 DIAGNOSIS — R76.8 HEPATITIS C ANTIBODY TEST POSITIVE: ICD-10-CM

## 2021-11-22 PROCEDURE — 87522 HEPATITIS C REVRS TRNSCRPJ: CPT | Performed by: FAMILY MEDICINE

## 2021-11-22 PROCEDURE — 36415 COLL VENOUS BLD VENIPUNCTURE: CPT | Mod: PO | Performed by: FAMILY MEDICINE

## 2021-11-26 ENCOUNTER — PATIENT MESSAGE (OUTPATIENT)
Dept: FAMILY MEDICINE | Facility: CLINIC | Age: 56
End: 2021-11-26
Payer: COMMERCIAL

## 2021-11-26 ENCOUNTER — TELEPHONE (OUTPATIENT)
Dept: HEPATOLOGY | Facility: CLINIC | Age: 56
End: 2021-11-26
Payer: COMMERCIAL

## 2021-11-26 DIAGNOSIS — R76.8 HEPATITIS C ANTIBODY TEST POSITIVE: Primary | ICD-10-CM

## 2021-11-26 DIAGNOSIS — B18.2 CHRONIC ACTIVE HEPATITIS C: ICD-10-CM

## 2021-11-26 LAB — HEPATITIS C VIRUS (HCV) RNA DETECTION/QUANTIFICATION RT-PCR: ABNORMAL IU/ML

## 2021-11-30 ENCOUNTER — PATIENT OUTREACH (OUTPATIENT)
Dept: ADMINISTRATIVE | Facility: OTHER | Age: 56
End: 2021-11-30
Payer: COMMERCIAL

## 2021-12-01 ENCOUNTER — OFFICE VISIT (OUTPATIENT)
Dept: UROLOGY | Facility: CLINIC | Age: 56
End: 2021-12-01
Payer: COMMERCIAL

## 2021-12-01 VITALS
WEIGHT: 157.63 LBS | HEART RATE: 83 BPM | SYSTOLIC BLOOD PRESSURE: 133 MMHG | RESPIRATION RATE: 18 BRPM | DIASTOLIC BLOOD PRESSURE: 86 MMHG | BODY MASS INDEX: 22.07 KG/M2 | HEIGHT: 71 IN

## 2021-12-01 DIAGNOSIS — B18.2 CHRONIC HEPATITIS C WITHOUT HEPATIC COMA: ICD-10-CM

## 2021-12-01 DIAGNOSIS — N28.89 KIDNEY MASS: Primary | ICD-10-CM

## 2021-12-01 DIAGNOSIS — I25.10 CORONARY ARTERY DISEASE INVOLVING NATIVE HEART, UNSPECIFIED VESSEL OR LESION TYPE, UNSPECIFIED WHETHER ANGINA PRESENT: ICD-10-CM

## 2021-12-01 DIAGNOSIS — Q61.9 CYSTIC KIDNEY DISEASE, UNSPECIFIED: ICD-10-CM

## 2021-12-01 DIAGNOSIS — F17.200 TOBACCO USE DISORDER, SEVERE, DEPENDENCE: ICD-10-CM

## 2021-12-01 DIAGNOSIS — N28.1 CYST OF KIDNEY, ACQUIRED: ICD-10-CM

## 2021-12-01 DIAGNOSIS — F10.90 CHRONIC ALCOHOL USE: ICD-10-CM

## 2021-12-01 PROCEDURE — 99999 PR PBB SHADOW E&M-EST. PATIENT-LVL IV: ICD-10-PCS | Mod: PBBFAC,,, | Performed by: STUDENT IN AN ORGANIZED HEALTH CARE EDUCATION/TRAINING PROGRAM

## 2021-12-01 PROCEDURE — 99203 PR OFFICE/OUTPT VISIT, NEW, LEVL III, 30-44 MIN: ICD-10-PCS | Mod: S$GLB,,, | Performed by: STUDENT IN AN ORGANIZED HEALTH CARE EDUCATION/TRAINING PROGRAM

## 2021-12-01 PROCEDURE — 99203 OFFICE O/P NEW LOW 30 MIN: CPT | Mod: S$GLB,,, | Performed by: STUDENT IN AN ORGANIZED HEALTH CARE EDUCATION/TRAINING PROGRAM

## 2021-12-01 PROCEDURE — 99999 PR PBB SHADOW E&M-EST. PATIENT-LVL IV: CPT | Mod: PBBFAC,,, | Performed by: STUDENT IN AN ORGANIZED HEALTH CARE EDUCATION/TRAINING PROGRAM

## 2021-12-03 ENCOUNTER — HOSPITAL ENCOUNTER (OUTPATIENT)
Dept: RADIOLOGY | Facility: HOSPITAL | Age: 56
Discharge: HOME | End: 2021-12-03
Attending: STUDENT IN AN ORGANIZED HEALTH CARE EDUCATION/TRAINING PROGRAM
Payer: COMMERCIAL

## 2021-12-03 ENCOUNTER — TELEPHONE (OUTPATIENT)
Dept: UROLOGY | Facility: CLINIC | Age: 56
End: 2021-12-03
Payer: COMMERCIAL

## 2021-12-03 DIAGNOSIS — N28.1 CYST OF KIDNEY, ACQUIRED: ICD-10-CM

## 2021-12-03 PROCEDURE — 25500020 PHARM REV CODE 255

## 2021-12-03 PROCEDURE — 74170 CT ABD WO CNTRST FLWD CNTRST: CPT | Mod: 26,,, | Performed by: RADIOLOGY

## 2021-12-03 PROCEDURE — 74170 CT ABDOMEN W WO CONTRAST: ICD-10-PCS | Mod: 26,,, | Performed by: RADIOLOGY

## 2021-12-03 PROCEDURE — 74170 CT ABD WO CNTRST FLWD CNTRST: CPT | Mod: TC

## 2021-12-03 RX ADMIN — IOHEXOL 75 ML: 350 INJECTION, SOLUTION INTRAVENOUS at 05:12

## 2021-12-07 DIAGNOSIS — N28.1 CYST OF KIDNEY, ACQUIRED: Primary | ICD-10-CM

## 2021-12-08 ENCOUNTER — PATIENT OUTREACH (OUTPATIENT)
Dept: EMERGENCY MEDICINE | Facility: HOSPITAL | Age: 56
End: 2021-12-08
Payer: COMMERCIAL

## 2021-12-08 ENCOUNTER — HOSPITAL ENCOUNTER (EMERGENCY)
Facility: HOSPITAL | Age: 56
Discharge: HOME | End: 2021-12-08
Attending: EMERGENCY MEDICINE
Payer: COMMERCIAL

## 2021-12-08 VITALS
OXYGEN SATURATION: 99 % | DIASTOLIC BLOOD PRESSURE: 93 MMHG | SYSTOLIC BLOOD PRESSURE: 142 MMHG | HEART RATE: 63 BPM | RESPIRATION RATE: 21 BRPM | TEMPERATURE: 98 F | BODY MASS INDEX: 22.32 KG/M2 | WEIGHT: 160 LBS

## 2021-12-08 DIAGNOSIS — R74.8 ELEVATED LIVER ENZYMES: ICD-10-CM

## 2021-12-08 DIAGNOSIS — F10.10 ALCOHOL ABUSE: ICD-10-CM

## 2021-12-08 DIAGNOSIS — R42 LIGHTHEADED: Primary | ICD-10-CM

## 2021-12-08 LAB
ALBUMIN SERPL BCP-MCNC: 3.9 G/DL (ref 3.5–5.2)
ALP SERPL-CCNC: 63 U/L (ref 55–135)
ALT SERPL W/O P-5'-P-CCNC: 314 U/L (ref 10–44)
ANION GAP SERPL CALC-SCNC: 7 MMOL/L (ref 8–16)
AST SERPL-CCNC: 236 U/L (ref 10–40)
BASOPHILS # BLD AUTO: 0.07 K/UL (ref 0–0.2)
BASOPHILS NFR BLD: 0.8 % (ref 0–1.9)
BILIRUB SERPL-MCNC: 0.9 MG/DL (ref 0.1–1)
BNP SERPL-MCNC: 50 PG/ML (ref 0–99)
BUN SERPL-MCNC: 18 MG/DL (ref 6–20)
CALCIUM SERPL-MCNC: 9.3 MG/DL (ref 8.7–10.5)
CHLORIDE SERPL-SCNC: 104 MMOL/L (ref 95–110)
CO2 SERPL-SCNC: 24 MMOL/L (ref 23–29)
CREAT SERPL-MCNC: 0.7 MG/DL (ref 0.5–1.4)
DIFFERENTIAL METHOD: ABNORMAL
EOSINOPHIL # BLD AUTO: 0.2 K/UL (ref 0–0.5)
EOSINOPHIL NFR BLD: 2 % (ref 0–8)
ERYTHROCYTE [DISTWIDTH] IN BLOOD BY AUTOMATED COUNT: 12.5 % (ref 11.5–14.5)
EST. GFR  (AFRICAN AMERICAN): >60 ML/MIN/1.73 M^2
EST. GFR  (NON AFRICAN AMERICAN): >60 ML/MIN/1.73 M^2
GLUCOSE SERPL-MCNC: 101 MG/DL (ref 70–110)
HCT VFR BLD AUTO: 44.9 % (ref 40–54)
HGB BLD-MCNC: 15.2 G/DL (ref 14–18)
IMM GRANULOCYTES # BLD AUTO: 0.01 K/UL (ref 0–0.04)
IMM GRANULOCYTES NFR BLD AUTO: 0.1 % (ref 0–0.5)
LYMPHOCYTES # BLD AUTO: 3.7 K/UL (ref 1–4.8)
LYMPHOCYTES NFR BLD: 43.9 % (ref 18–48)
MAGNESIUM SERPL-MCNC: 2 MG/DL (ref 1.6–2.6)
MCH RBC QN AUTO: 32.4 PG (ref 27–31)
MCHC RBC AUTO-ENTMCNC: 33.9 G/DL (ref 32–36)
MCV RBC AUTO: 96 FL (ref 82–98)
MONOCYTES # BLD AUTO: 0.8 K/UL (ref 0.3–1)
MONOCYTES NFR BLD: 10 % (ref 4–15)
NEUTROPHILS # BLD AUTO: 3.6 K/UL (ref 1.8–7.7)
NEUTROPHILS NFR BLD: 43.2 % (ref 38–73)
NRBC BLD-RTO: 0 /100 WBC
PLATELET # BLD AUTO: 216 K/UL (ref 150–450)
PMV BLD AUTO: 10.8 FL (ref 9.2–12.9)
POTASSIUM SERPL-SCNC: 3.9 MMOL/L (ref 3.5–5.1)
PROT SERPL-MCNC: 8 G/DL (ref 6–8.4)
RBC # BLD AUTO: 4.69 M/UL (ref 4.6–6.2)
SODIUM SERPL-SCNC: 135 MMOL/L (ref 136–145)
TROPONIN I SERPL DL<=0.01 NG/ML-MCNC: <0.03 NG/ML
TROPONIN I SERPL DL<=0.01 NG/ML-MCNC: <0.03 NG/ML
WBC # BLD AUTO: 8.36 K/UL (ref 3.9–12.7)

## 2021-12-08 PROCEDURE — 83880 ASSAY OF NATRIURETIC PEPTIDE: CPT | Performed by: NURSE PRACTITIONER

## 2021-12-08 PROCEDURE — 93010 EKG 12-LEAD: ICD-10-PCS | Mod: 76,,, | Performed by: INTERNAL MEDICINE

## 2021-12-08 PROCEDURE — 93010 ELECTROCARDIOGRAM REPORT: CPT | Mod: 76,,, | Performed by: INTERNAL MEDICINE

## 2021-12-08 PROCEDURE — 80053 COMPREHEN METABOLIC PANEL: CPT | Performed by: NURSE PRACTITIONER

## 2021-12-08 PROCEDURE — 84484 ASSAY OF TROPONIN QUANT: CPT | Performed by: NURSE PRACTITIONER

## 2021-12-08 PROCEDURE — 93005 ELECTROCARDIOGRAM TRACING: CPT | Performed by: INTERNAL MEDICINE

## 2021-12-08 PROCEDURE — 63600175 PHARM REV CODE 636 W HCPCS: Performed by: EMERGENCY MEDICINE

## 2021-12-08 PROCEDURE — 96360 HYDRATION IV INFUSION INIT: CPT

## 2021-12-08 PROCEDURE — 36415 COLL VENOUS BLD VENIPUNCTURE: CPT | Performed by: NURSE PRACTITIONER

## 2021-12-08 PROCEDURE — 83735 ASSAY OF MAGNESIUM: CPT | Performed by: NURSE PRACTITIONER

## 2021-12-08 PROCEDURE — 96361 HYDRATE IV INFUSION ADD-ON: CPT

## 2021-12-08 PROCEDURE — 93010 ELECTROCARDIOGRAM REPORT: CPT | Mod: ,,, | Performed by: INTERNAL MEDICINE

## 2021-12-08 PROCEDURE — 99284 EMERGENCY DEPT VISIT MOD MDM: CPT | Mod: 25

## 2021-12-08 PROCEDURE — 85025 COMPLETE CBC W/AUTO DIFF WBC: CPT | Performed by: NURSE PRACTITIONER

## 2021-12-08 RX ADMIN — SODIUM CHLORIDE, SODIUM LACTATE, POTASSIUM CHLORIDE, AND CALCIUM CHLORIDE 1000 ML: .6; .31; .03; .02 INJECTION, SOLUTION INTRAVENOUS at 02:12

## 2021-12-09 ENCOUNTER — OFFICE VISIT (OUTPATIENT)
Dept: CARDIOLOGY | Facility: CLINIC | Age: 56
End: 2021-12-09
Payer: COMMERCIAL

## 2021-12-09 VITALS
DIASTOLIC BLOOD PRESSURE: 84 MMHG | HEART RATE: 74 BPM | SYSTOLIC BLOOD PRESSURE: 126 MMHG | HEIGHT: 71 IN | WEIGHT: 156.81 LBS | BODY MASS INDEX: 21.95 KG/M2 | OXYGEN SATURATION: 98 % | RESPIRATION RATE: 16 BRPM

## 2021-12-09 DIAGNOSIS — I25.10 CORONARY ARTERY DISEASE INVOLVING NATIVE HEART, UNSPECIFIED VESSEL OR LESION TYPE, UNSPECIFIED WHETHER ANGINA PRESENT: Primary | ICD-10-CM

## 2021-12-09 DIAGNOSIS — F10.90 CHRONIC ALCOHOL USE: ICD-10-CM

## 2021-12-09 DIAGNOSIS — F17.200 TOBACCO USE DISORDER, SEVERE, DEPENDENCE: ICD-10-CM

## 2021-12-09 DIAGNOSIS — B18.2 CHRONIC HEPATITIS C WITHOUT HEPATIC COMA: ICD-10-CM

## 2021-12-09 PROCEDURE — 99214 PR OFFICE/OUTPT VISIT, EST, LEVL IV, 30-39 MIN: ICD-10-PCS | Mod: S$GLB,,, | Performed by: NURSE PRACTITIONER

## 2021-12-09 PROCEDURE — 99214 OFFICE O/P EST MOD 30 MIN: CPT | Mod: S$GLB,,, | Performed by: NURSE PRACTITIONER

## 2021-12-30 ENCOUNTER — OFFICE VISIT (OUTPATIENT)
Dept: PULMONOLOGY | Facility: CLINIC | Age: 56
End: 2021-12-30
Payer: COMMERCIAL

## 2021-12-30 VITALS
HEIGHT: 71 IN | WEIGHT: 159.38 LBS | RESPIRATION RATE: 18 BRPM | DIASTOLIC BLOOD PRESSURE: 89 MMHG | BODY MASS INDEX: 22.31 KG/M2 | HEART RATE: 87 BPM | OXYGEN SATURATION: 96 % | SYSTOLIC BLOOD PRESSURE: 131 MMHG

## 2021-12-30 DIAGNOSIS — J43.2 CENTRILOBULAR EMPHYSEMA: Primary | ICD-10-CM

## 2021-12-30 PROCEDURE — 3008F BODY MASS INDEX DOCD: CPT | Mod: CPTII,S$GLB,, | Performed by: NURSE PRACTITIONER

## 2021-12-30 PROCEDURE — 99204 OFFICE O/P NEW MOD 45 MIN: CPT | Mod: S$GLB,,, | Performed by: NURSE PRACTITIONER

## 2021-12-30 PROCEDURE — 1159F MED LIST DOCD IN RCRD: CPT | Mod: CPTII,S$GLB,, | Performed by: NURSE PRACTITIONER

## 2021-12-30 PROCEDURE — 99999 PR PBB SHADOW E&M-EST. PATIENT-LVL IV: ICD-10-PCS | Mod: PBBFAC,,, | Performed by: NURSE PRACTITIONER

## 2021-12-30 PROCEDURE — 99999 PR PBB SHADOW E&M-EST. PATIENT-LVL IV: CPT | Mod: PBBFAC,,, | Performed by: NURSE PRACTITIONER

## 2021-12-30 PROCEDURE — 3079F DIAST BP 80-89 MM HG: CPT | Mod: CPTII,S$GLB,, | Performed by: NURSE PRACTITIONER

## 2021-12-30 PROCEDURE — 1160F RVW MEDS BY RX/DR IN RCRD: CPT | Mod: CPTII,S$GLB,, | Performed by: NURSE PRACTITIONER

## 2021-12-30 PROCEDURE — 3008F PR BODY MASS INDEX (BMI) DOCUMENTED: ICD-10-PCS | Mod: CPTII,S$GLB,, | Performed by: NURSE PRACTITIONER

## 2021-12-30 PROCEDURE — 1160F PR REVIEW ALL MEDS BY PRESCRIBER/CLIN PHARMACIST DOCUMENTED: ICD-10-PCS | Mod: CPTII,S$GLB,, | Performed by: NURSE PRACTITIONER

## 2021-12-30 PROCEDURE — 99204 PR OFFICE/OUTPT VISIT, NEW, LEVL IV, 45-59 MIN: ICD-10-PCS | Mod: S$GLB,,, | Performed by: NURSE PRACTITIONER

## 2021-12-30 PROCEDURE — 3079F PR MOST RECENT DIASTOLIC BLOOD PRESSURE 80-89 MM HG: ICD-10-PCS | Mod: CPTII,S$GLB,, | Performed by: NURSE PRACTITIONER

## 2021-12-30 PROCEDURE — 3075F PR MOST RECENT SYSTOLIC BLOOD PRESS GE 130-139MM HG: ICD-10-PCS | Mod: CPTII,S$GLB,, | Performed by: NURSE PRACTITIONER

## 2021-12-30 PROCEDURE — 3075F SYST BP GE 130 - 139MM HG: CPT | Mod: CPTII,S$GLB,, | Performed by: NURSE PRACTITIONER

## 2021-12-30 PROCEDURE — 1159F PR MEDICATION LIST DOCUMENTED IN MEDICAL RECORD: ICD-10-PCS | Mod: CPTII,S$GLB,, | Performed by: NURSE PRACTITIONER

## 2021-12-30 RX ORDER — ALBUTEROL SULFATE 90 UG/1
2 AEROSOL, METERED RESPIRATORY (INHALATION) EVERY 4 HOURS PRN
Qty: 18 G | Refills: 11 | Status: SHIPPED | OUTPATIENT
Start: 2021-12-30 | End: 2022-02-22

## 2021-12-30 RX ORDER — ALBUTEROL SULFATE 0.83 MG/ML
2.5 SOLUTION RESPIRATORY (INHALATION) EVERY 6 HOURS PRN
Qty: 210 ML | Refills: 2 | Status: SHIPPED | OUTPATIENT
Start: 2021-12-30 | End: 2022-02-15 | Stop reason: SDUPTHER

## 2021-12-30 RX ORDER — GUAIFENESIN 1200 MG/1
1200 TABLET, EXTENDED RELEASE ORAL DAILY
Qty: 30 TABLET | Refills: 1 | Status: SHIPPED | OUTPATIENT
Start: 2021-12-30 | End: 2022-01-09

## 2022-01-10 ENCOUNTER — PATIENT OUTREACH (OUTPATIENT)
Dept: ADMINISTRATIVE | Facility: OTHER | Age: 57
End: 2022-01-10
Payer: COMMERCIAL

## 2022-01-10 NOTE — PROGRESS NOTES
Health Maintenance Due   Topic Date Due    Colorectal Cancer Screening  Never done    COVID-19 Vaccine (2 - Booster for Gilda series) 08/07/2021     Updates were requested from care everywhere.  Chart was reviewed for overdue Proactive [facility] Encounters ([facility]) topics (CRS, Breast Cancer Screening, Eye exam)  Health Maintenance has been updated.  LINKS immunization registry triggered.  Immunizations were reconciled.

## 2022-01-11 ENCOUNTER — OFFICE VISIT (OUTPATIENT)
Dept: HEPATOLOGY | Facility: CLINIC | Age: 57
End: 2022-01-11
Payer: COMMERCIAL

## 2022-01-11 ENCOUNTER — LAB VISIT (OUTPATIENT)
Dept: LAB | Facility: HOSPITAL | Age: 57
End: 2022-01-11
Attending: PHYSICIAN ASSISTANT
Payer: COMMERCIAL

## 2022-01-11 VITALS
WEIGHT: 158.31 LBS | DIASTOLIC BLOOD PRESSURE: 93 MMHG | BODY MASS INDEX: 22.16 KG/M2 | RESPIRATION RATE: 20 BRPM | TEMPERATURE: 99 F | SYSTOLIC BLOOD PRESSURE: 133 MMHG | HEIGHT: 71 IN | HEART RATE: 102 BPM

## 2022-01-11 DIAGNOSIS — R74.8 ABNORMAL TRANSAMINASES: ICD-10-CM

## 2022-01-11 DIAGNOSIS — B18.2 CHRONIC HEPATITIS C WITHOUT HEPATIC COMA: ICD-10-CM

## 2022-01-11 DIAGNOSIS — B18.2 CHRONIC HEPATITIS C WITHOUT HEPATIC COMA: Primary | ICD-10-CM

## 2022-01-11 DIAGNOSIS — B18.2 CHRONIC ACTIVE HEPATITIS C: ICD-10-CM

## 2022-01-11 LAB
AFP SERPL-MCNC: 7.9 NG/ML (ref 0–8.4)
ALBUMIN SERPL BCP-MCNC: 3.8 G/DL (ref 3.5–5.2)
ALP SERPL-CCNC: 69 U/L (ref 55–135)
ALT SERPL W/O P-5'-P-CCNC: 171 U/L (ref 10–44)
ANION GAP SERPL CALC-SCNC: 7 MMOL/L (ref 8–16)
AST SERPL-CCNC: 89 U/L (ref 10–40)
BILIRUB DIRECT SERPL-MCNC: 0.6 MG/DL (ref 0.1–0.3)
BILIRUB SERPL-MCNC: 1.3 MG/DL (ref 0.1–1)
BUN SERPL-MCNC: 17 MG/DL (ref 6–20)
CALCIUM SERPL-MCNC: 9.9 MG/DL (ref 8.7–10.5)
CERULOPLASMIN SERPL-MCNC: 21 MG/DL (ref 15–45)
CHLORIDE SERPL-SCNC: 103 MMOL/L (ref 95–110)
CO2 SERPL-SCNC: 27 MMOL/L (ref 23–29)
CREAT SERPL-MCNC: 0.7 MG/DL (ref 0.5–1.4)
EST. GFR  (AFRICAN AMERICAN): >60 ML/MIN/1.73 M^2
EST. GFR  (NON AFRICAN AMERICAN): >60 ML/MIN/1.73 M^2
FERRITIN SERPL-MCNC: 799 NG/ML (ref 20–300)
GLUCOSE SERPL-MCNC: 103 MG/DL (ref 70–110)
IGG SERPL-MCNC: 1637 MG/DL (ref 650–1600)
INR PPP: 1 (ref 0.8–1.2)
IRON SERPL-MCNC: 201 UG/DL (ref 45–160)
POTASSIUM SERPL-SCNC: 4.1 MMOL/L (ref 3.5–5.1)
PROT SERPL-MCNC: 8 G/DL (ref 6–8.4)
PROTHROMBIN TIME: 11.1 SEC (ref 9–12.5)
SATURATED IRON: 57 % (ref 20–50)
SODIUM SERPL-SCNC: 137 MMOL/L (ref 136–145)
TOTAL IRON BINDING CAPACITY: 352 UG/DL (ref 250–450)
TRANSFERRIN SERPL-MCNC: 238 MG/DL (ref 200–375)

## 2022-01-11 PROCEDURE — 82248 BILIRUBIN DIRECT: CPT | Performed by: PHYSICIAN ASSISTANT

## 2022-01-11 PROCEDURE — 83540 ASSAY OF IRON: CPT | Performed by: PHYSICIAN ASSISTANT

## 2022-01-11 PROCEDURE — 86704 HEP B CORE ANTIBODY TOTAL: CPT | Performed by: PHYSICIAN ASSISTANT

## 2022-01-11 PROCEDURE — 86235 NUCLEAR ANTIGEN ANTIBODY: CPT | Performed by: PHYSICIAN ASSISTANT

## 2022-01-11 PROCEDURE — 99999 PR PBB SHADOW E&M-EST. PATIENT-LVL IV: ICD-10-PCS | Mod: PBBFAC,,, | Performed by: PHYSICIAN ASSISTANT

## 2022-01-11 PROCEDURE — 86256 FLUORESCENT ANTIBODY TITER: CPT | Mod: 91 | Performed by: PHYSICIAN ASSISTANT

## 2022-01-11 PROCEDURE — 99203 PR OFFICE/OUTPT VISIT, NEW, LEVL III, 30-44 MIN: ICD-10-PCS | Mod: S$GLB,,, | Performed by: PHYSICIAN ASSISTANT

## 2022-01-11 PROCEDURE — 87902 NFCT AGT GNTYP ALYS HEP C: CPT | Performed by: PHYSICIAN ASSISTANT

## 2022-01-11 PROCEDURE — 86235 NUCLEAR ANTIGEN ANTIBODY: CPT | Mod: 59 | Performed by: PHYSICIAN ASSISTANT

## 2022-01-11 PROCEDURE — 86790 VIRUS ANTIBODY NOS: CPT | Performed by: PHYSICIAN ASSISTANT

## 2022-01-11 PROCEDURE — 99203 OFFICE O/P NEW LOW 30 MIN: CPT | Mod: S$GLB,,, | Performed by: PHYSICIAN ASSISTANT

## 2022-01-11 PROCEDURE — 86039 ANTINUCLEAR ANTIBODIES (ANA): CPT | Performed by: PHYSICIAN ASSISTANT

## 2022-01-11 PROCEDURE — 99999 PR PBB SHADOW E&M-EST. PATIENT-LVL IV: CPT | Mod: PBBFAC,,, | Performed by: PHYSICIAN ASSISTANT

## 2022-01-11 PROCEDURE — 80053 COMPREHEN METABOLIC PANEL: CPT | Performed by: PHYSICIAN ASSISTANT

## 2022-01-11 PROCEDURE — 82103 ALPHA-1-ANTITRYPSIN TOTAL: CPT | Performed by: PHYSICIAN ASSISTANT

## 2022-01-11 PROCEDURE — 82390 ASSAY OF CERULOPLASMIN: CPT | Performed by: PHYSICIAN ASSISTANT

## 2022-01-11 PROCEDURE — 82728 ASSAY OF FERRITIN: CPT | Performed by: PHYSICIAN ASSISTANT

## 2022-01-11 PROCEDURE — 82105 ALPHA-FETOPROTEIN SERUM: CPT | Performed by: PHYSICIAN ASSISTANT

## 2022-01-11 PROCEDURE — 85610 PROTHROMBIN TIME: CPT | Mod: PO | Performed by: PHYSICIAN ASSISTANT

## 2022-01-11 PROCEDURE — 86706 HEP B SURFACE ANTIBODY: CPT | Performed by: PHYSICIAN ASSISTANT

## 2022-01-11 PROCEDURE — 80321 ALCOHOLS BIOMARKERS 1OR 2: CPT | Performed by: PHYSICIAN ASSISTANT

## 2022-01-11 PROCEDURE — 87389 HIV-1 AG W/HIV-1&-2 AB AG IA: CPT | Performed by: PHYSICIAN ASSISTANT

## 2022-01-11 PROCEDURE — 86038 ANTINUCLEAR ANTIBODIES: CPT | Performed by: PHYSICIAN ASSISTANT

## 2022-01-11 PROCEDURE — 87340 HEPATITIS B SURFACE AG IA: CPT | Performed by: PHYSICIAN ASSISTANT

## 2022-01-11 PROCEDURE — 82784 ASSAY IGA/IGD/IGG/IGM EACH: CPT | Performed by: PHYSICIAN ASSISTANT

## 2022-01-11 NOTE — PROGRESS NOTES
"HEPATOLOGY CLINIC VISIT NOTE - HCV clinic  REFERRING PROVIDER: Kathie Aranda MD  CHIEF COMPLAINT: Hepatitis C     HISTORY     This is a 56 y.o. White male with chronic hepatitis C, here for further eval / mngmt.       HCV history:  Originally diagnosed 15 years; has never received specialty care until now   Risks for HCV:  Blood transfusion - no    IVDA - no    Intranasal drug use - yes    Tattoos - yes  - Treatment naive  - Genotype ?    Liver staging:  No formal liver staging  · Several imaging studies this year - unremarkable liver. No SM  · Labs w/ well preserved liver function   Persistent AST/ALT elevation, most recent labs notably so: ,     Intermittent TBili elevation up to 1.7   Normal plt typically > 200     Feels okay  Notes he is an alcoholic. Knows there are "good things behind AA doors if he wants it"  Denies jaundice, dark urine, abdominal distention, hematemesis, melena, slowed mentation.   No abnormal skin rashes. No generalized joint pain.        PMH, PSH, PROBLEM LIST, SOCIAL HX, FAMILY HX, MEDS, ALLERGIES   Reviewed in Epic  FAMILY HX: neg for liver diease  Works installing duct work  Alcohol - ~ 30 yr hx heavy use "I'm an alcoholic"  Drugs - prior IN drug use      ROS:   Review of Systems   Constitutional: Negative for fever.   Cardiovascular: Negative for chest pain and leg swelling.   Gastrointestinal: Negative for abdominal pain.   Musculoskeletal: Negative for joint pain.   Skin: Negative for rash.   Psychiatric/Behavioral: Negative for memory loss.       PHYSICAL EXAM:  Friendly White male, in no acute distress; alert and oriented to person, place and time  VITALS: reviewed  HEENT: Sclerae anicteric.   NECK: Supple  CVS: Regular rate and rhythm. No murmurs  LUNGS: Normal respiratory effort. Clear bilaterally  ABDOMEN: Flat, soft, nontender. No organomegaly or masses. No ascites or hernias. Good bowel sounds.    SKIN: Warm and dry. No jaundice, No obvious rashes. (+) " tattoo  EXTREMITIES: No lower extremity edema  NEURO/PSYCH: Normal gate. Memory intact. Thought and speech pattern appropriate. Behavior normal. No depression or anxiety noted.    PERTINENT DIAGNOSTIC RESULTS     Lab Results   Component Value Date    WBC 8.36 12/08/2021    HGB 15.2 12/08/2021     12/08/2021     Lab Results   Component Value Date    INR 1.0 08/04/2021     Lab Results   Component Value Date     (H) 12/08/2021     (H) 12/08/2021    BILITOT 0.9 12/08/2021    ALBUMIN 3.9 12/08/2021    ALKPHOS 63 12/08/2021    CREATININE 0.7 12/08/2021    BUN 18 12/08/2021     (L) 12/08/2021    K 3.9 12/08/2021         ASSESSMENT     56 y.o. White male with:  1. CHRONIC HEPATITIS C, GENOTYPE ? - treatment naive  -- Unknown Immunity to HAV & HBV  2. NOTABLE TRANSAMINASE ELEVATION  -- obviously #1 and #3 contributing, need to r/o other causes  3. ALCOHOL ABUSE      PLAN     1. Labs today  2. F/u visit in 6 weeks    Will need liver staging but ideally don't want to scheduled unless transaminases are lower due to risk of falsely elevated fibrosis score. That being said, he certainly has risk of advanced fibrosis given hx of HCV w/ heavy alcohol    Orders Placed This Encounter   Procedures    Comprehensive Metabolic Panel    Protime-INR    HIV 1/2 Ag/Ab (4th Gen)    Hepatitis C Genotype    Hepatitis B Surface Antigen    Hepatitis B Surface Ab, Qualitative    Hepatitis B Core Antibody, Total    Hepatitis A antibody, IgG    Ferritin    Iron and TIBC    WINSOME Screen w/Reflex    Antimitochondrial Antibody    Anti-Smooth Muscle Antibody    IgG    Phosphatidylethanol (PETH)    Alpha 1 Antitrypsin Phenotype    Ceruloplasmin    AFP Tumor Marker    Bilirubin, Direct         ____________________________________________________________________________  EDUCATION:  The natural history of Hepatitis C, including potential progression to cirrhosis was reviewed. We discussed the increased  progression of liver disease secondary to alcohol use; patient was advised to avoid alcohol completely.     Transmission of Hepatitis C was reviewed, including possible sexual transmission. Sexual contacts should be screened. Patient should avoid sharing personal products such as razors, toothbrushes, etc.     Recommend avoiding raw seafood.  Limit acetaminophen to 2000mg daily.  __________________________________________________________________    Duration of encounter: 38 min  This includes face-to-face time and non face-to-face time preparing to see the patient (eg, review of tests), obtaining and/or reviewing separately obtained history, documenting clinical information in the electronic or other health record, independently interpreting resultsand communicating results to the patient/family/caregiver, or care coordination.

## 2022-01-12 LAB
ANA PATTERN 1: NORMAL
ANA SER QL IF: POSITIVE
ANA TITR SER IF: NORMAL {TITER}
HAV IGG SER QL IA: NEGATIVE
HBV CORE AB SERPL QL IA: NEGATIVE
HBV SURFACE AB SER-ACNC: NEGATIVE M[IU]/ML
HBV SURFACE AG SERPL QL IA: NEGATIVE
HIV 1+2 AB+HIV1 P24 AG SERPL QL IA: NEGATIVE

## 2022-01-14 LAB
A1AT PHENOTYP SERPL-IMP: NORMAL BANDS
A1AT SERPL NEPH-MCNC: 179 MG/DL (ref 100–190)
ANTI SM ANTIBODY: 0.09 RATIO (ref 0–0.99)
ANTI SM/RNP ANTIBODY: 0.06 RATIO (ref 0–0.99)
ANTI-SM INTERPRETATION: NEGATIVE
ANTI-SM/RNP INTERPRETATION: NEGATIVE
ANTI-SSA ANTIBODY: 0.08 RATIO (ref 0–0.99)
ANTI-SSA INTERPRETATION: NEGATIVE
ANTI-SSB ANTIBODY: 0.09 RATIO (ref 0–0.99)
ANTI-SSB INTERPRETATION: NEGATIVE
DSDNA AB SER-ACNC: NORMAL [IU]/ML

## 2022-01-16 LAB
PETH 16:0/18.1 (POPETH): 462 NG/ML
PETH 16:0/18.2 (PLPETH): 257 NG/ML

## 2022-01-17 LAB
HCV GENTYP SERPL NAA+PROBE: ABNORMAL
HCV RNA SERPL NAA+PROBE-LOG IU: 4.95 LOG (10) IU/ML
HCV RNA SERPL QL NAA+PROBE: DETECTED
HCV RNA SPEC NAA+PROBE-ACNC: ABNORMAL IU/ML

## 2022-01-18 LAB — MITOCHONDRIA AB TITR SER IF: NORMAL {TITER}

## 2022-01-20 LAB — SMOOTH MUSCLE AB TITR SER IF: ABNORMAL {TITER}

## 2022-02-09 ENCOUNTER — TELEPHONE (OUTPATIENT)
Dept: FAMILY MEDICINE | Facility: CLINIC | Age: 57
End: 2022-02-09
Payer: COMMERCIAL

## 2022-02-09 NOTE — TELEPHONE ENCOUNTER
LVM for pt to return call to reschedule upcoming appointment due to PCP being out of clinic on 2/15/2022. Portal message sent

## 2022-02-15 ENCOUNTER — OFFICE VISIT (OUTPATIENT)
Dept: FAMILY MEDICINE | Facility: CLINIC | Age: 57
End: 2022-02-15
Payer: COMMERCIAL

## 2022-02-15 VITALS
DIASTOLIC BLOOD PRESSURE: 70 MMHG | TEMPERATURE: 98 F | SYSTOLIC BLOOD PRESSURE: 122 MMHG | WEIGHT: 160.94 LBS | OXYGEN SATURATION: 97 % | BODY MASS INDEX: 22.53 KG/M2 | HEIGHT: 71 IN | HEART RATE: 68 BPM

## 2022-02-15 DIAGNOSIS — I25.10 CORONARY ARTERY DISEASE INVOLVING NATIVE CORONARY ARTERY OF NATIVE HEART WITHOUT ANGINA PECTORIS: Primary | ICD-10-CM

## 2022-02-15 DIAGNOSIS — J43.2 CENTRILOBULAR EMPHYSEMA: ICD-10-CM

## 2022-02-15 DIAGNOSIS — F17.200 TOBACCO USE DISORDER, SEVERE, DEPENDENCE: ICD-10-CM

## 2022-02-15 DIAGNOSIS — Z12.11 COLON CANCER SCREENING: ICD-10-CM

## 2022-02-15 PROCEDURE — 3074F SYST BP LT 130 MM HG: CPT | Mod: CPTII,S$GLB,, | Performed by: FAMILY MEDICINE

## 2022-02-15 PROCEDURE — 3078F DIAST BP <80 MM HG: CPT | Mod: CPTII,S$GLB,, | Performed by: FAMILY MEDICINE

## 2022-02-15 PROCEDURE — 99999 PR PBB SHADOW E&M-EST. PATIENT-LVL IV: ICD-10-PCS | Mod: PBBFAC,,, | Performed by: FAMILY MEDICINE

## 2022-02-15 PROCEDURE — 3008F BODY MASS INDEX DOCD: CPT | Mod: CPTII,S$GLB,, | Performed by: FAMILY MEDICINE

## 2022-02-15 PROCEDURE — 99213 OFFICE O/P EST LOW 20 MIN: CPT | Mod: S$GLB,,, | Performed by: FAMILY MEDICINE

## 2022-02-15 PROCEDURE — 3008F PR BODY MASS INDEX (BMI) DOCUMENTED: ICD-10-PCS | Mod: CPTII,S$GLB,, | Performed by: FAMILY MEDICINE

## 2022-02-15 PROCEDURE — 1159F PR MEDICATION LIST DOCUMENTED IN MEDICAL RECORD: ICD-10-PCS | Mod: CPTII,S$GLB,, | Performed by: FAMILY MEDICINE

## 2022-02-15 PROCEDURE — 99213 PR OFFICE/OUTPT VISIT, EST, LEVL III, 20-29 MIN: ICD-10-PCS | Mod: S$GLB,,, | Performed by: FAMILY MEDICINE

## 2022-02-15 PROCEDURE — 1159F MED LIST DOCD IN RCRD: CPT | Mod: CPTII,S$GLB,, | Performed by: FAMILY MEDICINE

## 2022-02-15 PROCEDURE — 3074F PR MOST RECENT SYSTOLIC BLOOD PRESSURE < 130 MM HG: ICD-10-PCS | Mod: CPTII,S$GLB,, | Performed by: FAMILY MEDICINE

## 2022-02-15 PROCEDURE — 99999 PR PBB SHADOW E&M-EST. PATIENT-LVL IV: CPT | Mod: PBBFAC,,, | Performed by: FAMILY MEDICINE

## 2022-02-15 PROCEDURE — 3078F PR MOST RECENT DIASTOLIC BLOOD PRESSURE < 80 MM HG: ICD-10-PCS | Mod: CPTII,S$GLB,, | Performed by: FAMILY MEDICINE

## 2022-02-15 RX ORDER — METOPROLOL SUCCINATE 25 MG/1
25 TABLET, EXTENDED RELEASE ORAL DAILY
Qty: 90 TABLET | Refills: 3 | Status: SHIPPED | OUTPATIENT
Start: 2022-02-15 | End: 2022-06-08 | Stop reason: SDUPTHER

## 2022-02-15 RX ORDER — CLOPIDOGREL BISULFATE 75 MG/1
75 TABLET ORAL DAILY
Qty: 90 TABLET | Refills: 3 | Status: SHIPPED | OUTPATIENT
Start: 2022-02-15 | End: 2022-06-08 | Stop reason: SDUPTHER

## 2022-02-15 RX ORDER — ISOSORBIDE MONONITRATE 30 MG/1
30 TABLET, EXTENDED RELEASE ORAL DAILY
Qty: 90 TABLET | Refills: 3 | Status: SHIPPED | OUTPATIENT
Start: 2022-02-15 | End: 2022-06-08 | Stop reason: SDUPTHER

## 2022-02-15 RX ORDER — ALBUTEROL SULFATE 0.83 MG/ML
2.5 SOLUTION RESPIRATORY (INHALATION) EVERY 6 HOURS PRN
Qty: 210 ML | Refills: 2 | Status: SHIPPED | OUTPATIENT
Start: 2022-02-15 | End: 2022-02-22

## 2022-02-15 RX ORDER — DIPHENHYDRAMINE HCL 25 MG
4 CAPSULE ORAL
Qty: 180 EACH | Refills: 1 | Status: SHIPPED | OUTPATIENT
Start: 2022-02-15 | End: 2022-08-14

## 2022-02-15 NOTE — PATIENT INSTRUCTIONS
If you are due for any health screening(s) below please notify me so we can arrange them to be ordered and scheduled to maintain your health.     Health Maintenance   Topic Date Due    TETANUS VACCINE  11/11/2022 (Originally 12/7/1983)    LDCT Lung Screen  11/15/2022    High Dose Statin  02/15/2023    Lipid Panel  09/10/2026    Hepatitis C Screening  Completed          Colon Cancer Screening    Of cancers affecting both men and women, colorectal cancer is the third leading cancer killer in the [place]. But it doesnt have to be. Screening can prevent colorectal cancer or find it at an early stage when treatment often leads to a cure.    A colonoscopy is the preferred test for detecting colon cancer. It is needed only once every 10 years if results are negative. While sedated, a flexible, lighted tube with a tiny camera is inserted into the rectum and advanced through the colon to look for cancers. An alternative screening test that is used at home and returned to the lab may also be used. It detects hidden blood in bowel movements which could indicate cancer in the colon. If results are positive, you will need a colonoscopy to determine if the blood is a sign of cancer. This type of follow up (diagnostic) colonoscopy usually requires additional copays as required by your insurance provider. Please contact your PCP if you have any questions.    Although you are still overdue for this important screening, due to the COVID-19 pandemic, we recommend scheduling it in the near future.

## 2022-02-16 ENCOUNTER — TELEPHONE (OUTPATIENT)
Dept: FAMILY MEDICINE | Facility: CLINIC | Age: 57
End: 2022-02-16
Payer: COMMERCIAL

## 2022-02-17 ENCOUNTER — TELEPHONE (OUTPATIENT)
Dept: FAMILY MEDICINE | Facility: CLINIC | Age: 57
End: 2022-02-17
Payer: COMMERCIAL

## 2022-02-21 ENCOUNTER — PATIENT OUTREACH (OUTPATIENT)
Dept: ADMINISTRATIVE | Facility: OTHER | Age: 57
End: 2022-02-21
Payer: COMMERCIAL

## 2022-02-21 NOTE — PROGRESS NOTES
Subjective:       Patient ID: Anatoly Hatch III is a 56 y.o. male.    Chief Complaint: Follow-up (3 month f/u)    Patient is a 48 year old male with history of coronary artery disease centrilobular emphysema, hyperlipidemia and chronic hepatitis C presenting for routine health maintenance and follow up, patient endorses compliance with medication regimen and denies any acute symptoms today.           Current Outpatient Medications:     albuterol (VENTOLIN HFA) 90 mcg/actuation inhaler, Inhale 2 puffs into the lungs every 4 (four) hours as needed for Wheezing. Rescue, Disp: 18 g, Rfl: 11    aspirin (ECOTRIN) 81 MG EC tablet, Take 1 tablet (81 mg total) by mouth once daily., Disp: 90 tablet, Rfl: 3    co-enzyme Q-10 30 mg capsule, Take 1 capsule (30 mg total) by mouth once daily., Disp: 30 capsule, Rfl: 11    nicotine (NICODERM CQ) 21 mg/24 hr, Place 1 patch onto the skin once daily., Disp: 90 patch, Rfl: 1    rosuvastatin (CRESTOR) 40 MG Tab, Take 1 tablet (40 mg total) by mouth every evening., Disp: 90 tablet, Rfl: 3    albuterol (PROVENTIL) 2.5 mg /3 mL (0.083 %) nebulizer solution, Take 3 mLs (2.5 mg total) by nebulization every 6 (six) hours as needed for Wheezing or Shortness of Breath. Rescue, Disp: 210 mL, Rfl: 2    clopidogreL (PLAVIX) 75 mg tablet, Take 1 tablet (75 mg total) by mouth once daily., Disp: 90 tablet, Rfl: 3    isosorbide mononitrate (IMDUR) 30 MG 24 hr tablet, Take 1 tablet (30 mg total) by mouth once daily., Disp: 90 tablet, Rfl: 3    metoprolol succinate (TOPROL-XL) 25 MG 24 hr tablet, Take 1 tablet (25 mg total) by mouth once daily., Disp: 90 tablet, Rfl: 3    nicotine polacrilex (NICORETTE) 4 MG Gum, Take 1 each (4 mg total) by mouth as needed (tobacco cravings)., Disp: 180 each, Rfl: 1    Review of patient's allergies indicates:  No Known Allergies    Past Medical History:   Diagnosis Date    Abdominal abscess 09/21/2017    Appendicitis 09/21/2017    Hepatitis 2005     NSTEMI (non-ST elevated myocardial infarction) 8/4/2021       Past Surgical History:   Procedure Laterality Date    ANGIOGRAM, CORONARY, WITH LEFT HEART CATHETERIZATION N/A 8/5/2021    Procedure: Angiogram, Coronary, with Left Heart Cath;  Surgeon: Erasmo Slaughter MD;  Location: [facility] CATH[facility]EP LAB;  Service: Cardiology;  Laterality: N/A;    APPENDECTOMY  09/21/2017    done at [facility] by Dr. Vasquez- Lap    SMALL INTESTINE SURGERY  02/25/2018    SB Resection, drng abscess - Dr. Mar - [facility]       History reviewed. No pertinent family history.    Social History     Tobacco Use    Smoking status: Current Every Day Smoker     Packs/day: 1.00     Years: 30.00     Pack years: 30.00     Types: Cigarettes    Smokeless tobacco: Never Used   Substance Use Topics    Alcohol use: Yes     Comment: 4 glasses of whiskey daily    Drug use: No       Review of Systems   Constitutional: Negative for activity change, appetite change, chills, diaphoresis, fatigue, fever and unexpected weight change.   HENT: Negative for hearing loss, postnasal drip, rhinorrhea, sinus pressure/congestion, sore throat and trouble swallowing.    Eyes: Negative for visual disturbance.   Respiratory: Negative for apnea, chest tightness, shortness of breath and wheezing.    Cardiovascular: Negative for chest pain and palpitations.   Gastrointestinal: Negative for abdominal pain, blood in stool, change in bowel habit, constipation, diarrhea, nausea, vomiting and change in bowel habit.   Endocrine: Negative for polydipsia and polyphagia.   Genitourinary: Negative for dysuria, enuresis, flank pain, frequency and urgency.   Integumentary:  Negative for rash and mole/lesion.   Neurological: Negative for dizziness, light-headedness, headaches and memory loss.   Psychiatric/Behavioral: Negative for confusion, decreased concentration, sleep disturbance and suicidal ideas. The patient is not nervous/anxious.            Objective:      Vitals:    02/15/22 1608  "  BP: 122/70   BP Location: Right arm   Patient Position: Sitting   BP Method: Medium (Manual)   Pulse: 68   Temp: 98.2 °F (36.8 °C)   TempSrc: Oral   SpO2: 97%   Weight: 73 kg (160 lb 15 oz)   Height: 5' 11" (1.803 m)     Physical Exam  Constitutional:       General: He is not in acute distress.     Appearance: He is normal weight. He is not ill-appearing, toxic-appearing or diaphoretic.   HENT:      Head: Normocephalic and atraumatic.      Nose: Nose normal. No congestion or rhinorrhea.      Mouth/Throat:      Mouth: Mucous membranes are moist.      Pharynx: No oropharyngeal exudate or posterior oropharyngeal erythema.   Eyes:      General: No scleral icterus.        Right eye: No discharge.         Left eye: No discharge.      Conjunctiva/sclera: Conjunctivae normal.      Pupils: Pupils are equal, round, and reactive to light.   Cardiovascular:      Rate and Rhythm: Normal rate and regular rhythm.      Pulses: Normal pulses.      Heart sounds: Normal heart sounds. No murmur heard.    No friction rub. No gallop.   Pulmonary:      Effort: Pulmonary effort is normal. No respiratory distress.      Breath sounds: Normal breath sounds. No stridor. No wheezing, rhonchi or rales.   Chest:      Chest wall: No tenderness.   Abdominal:      General: Abdomen is flat. Bowel sounds are normal. There is no distension.      Palpations: Abdomen is soft. There is no mass.      Tenderness: There is no abdominal tenderness. There is no guarding or rebound.      Hernia: No hernia is present.   Musculoskeletal:      Cervical back: No rigidity or tenderness.   Neurological:      Mental Status: He is alert.           Assessment:       1. Coronary artery disease involving native coronary artery of native heart without angina pectoris    2. Tobacco use disorder, severe, dependence    3. Lung nodule < 6cm on CT    4. Centrilobular emphysema    5. Colon cancer screening        Plan:           Coronary artery disease involving native " coronary artery of native heart without angina pectoris  -     isosorbide mononitrate (IMDUR) 30 MG 24 hr tablet; Take 1 tablet (30 mg total) by mouth once daily.  Dispense: 90 tablet; Refill: 3  -     metoprolol succinate (TOPROL-XL) 25 MG 24 hr tablet; Take 1 tablet (25 mg total) by mouth once daily.  Dispense: 90 tablet; Refill: 3  -     clopidogreL (PLAVIX) 75 mg tablet; Take 1 tablet (75 mg total) by mouth once daily.  Dispense: 90 tablet; Refill: 3    Tobacco use disorder, severe, dependence  -     nicotine polacrilex (NICORETTE) 4 MG Gum; Take 1 each (4 mg total) by mouth as needed (tobacco cravings).  Dispense: 180 each; Refill: 1    Lung nodule < 6cm on CT  -     albuterol (PROVENTIL) 2.5 mg /3 mL (0.083 %) nebulizer solution; Take 3 mLs (2.5 mg total) by nebulization every 6 (six) hours as needed for Wheezing or Shortness of Breath. Rescue  Dispense: 210 mL; Refill: 2    Centrilobular emphysema  -     albuterol (PROVENTIL) 2.5 mg /3 mL (0.083 %) nebulizer solution; Take 3 mLs (2.5 mg total) by nebulization every 6 (six) hours as needed for Wheezing or Shortness of Breath. Rescue  Dispense: 210 mL; Refill: 2    Colon cancer screening  -     Ambulatory referral/consult to Gastroenterology; Future; Expected date: 02/22/2022        Follow up in about 6 months (around 8/15/2022).    Kathie Aranda MD         DISCLAIMER: This note was prepared with [employer] Naturally Speaking voice recognition transcription software. Garbled syntax, mangled pronouns, and other bizarre constructions may be attributed to that software system.

## 2022-02-22 ENCOUNTER — CLINICAL SUPPORT (OUTPATIENT)
Dept: INTERNAL MEDICINE | Facility: CLINIC | Age: 57
End: 2022-02-22
Payer: COMMERCIAL

## 2022-02-22 ENCOUNTER — LAB VISIT (OUTPATIENT)
Dept: LAB | Facility: HOSPITAL | Age: 57
End: 2022-02-22
Attending: PHYSICIAN ASSISTANT
Payer: COMMERCIAL

## 2022-02-22 ENCOUNTER — OFFICE VISIT (OUTPATIENT)
Dept: HEPATOLOGY | Facility: CLINIC | Age: 57
End: 2022-02-22
Payer: COMMERCIAL

## 2022-02-22 VITALS
DIASTOLIC BLOOD PRESSURE: 78 MMHG | HEIGHT: 71 IN | SYSTOLIC BLOOD PRESSURE: 127 MMHG | WEIGHT: 160.69 LBS | BODY MASS INDEX: 22.5 KG/M2 | RESPIRATION RATE: 12 BRPM | TEMPERATURE: 98 F | HEART RATE: 94 BPM

## 2022-02-22 VITALS — SYSTOLIC BLOOD PRESSURE: 127 MMHG | DIASTOLIC BLOOD PRESSURE: 78 MMHG

## 2022-02-22 DIAGNOSIS — B18.2 CHRONIC HEPATITIS C WITHOUT HEPATIC COMA: Primary | ICD-10-CM

## 2022-02-22 DIAGNOSIS — B18.2 CHRONIC HEPATITIS C WITHOUT HEPATIC COMA: ICD-10-CM

## 2022-02-22 DIAGNOSIS — Z23 NEED FOR HEPATITIS VACCINATION: Primary | ICD-10-CM

## 2022-02-22 DIAGNOSIS — Z23 NEED FOR HEPATITIS VACCINATION: ICD-10-CM

## 2022-02-22 LAB
ALBUMIN SERPL BCP-MCNC: 3.5 G/DL (ref 3.5–5.2)
ALP SERPL-CCNC: 73 U/L (ref 55–135)
ALT SERPL W/O P-5'-P-CCNC: 341 U/L (ref 10–44)
AST SERPL-CCNC: 227 U/L (ref 10–40)
BILIRUB DIRECT SERPL-MCNC: 0.7 MG/DL (ref 0.1–0.3)
BILIRUB SERPL-MCNC: 1.4 MG/DL (ref 0.1–1)
PROT SERPL-MCNC: 7.2 G/DL (ref 6–8.4)

## 2022-02-22 PROCEDURE — 90471 IMMUNIZATION ADMIN: CPT | Mod: S$GLB,,, | Performed by: PHYSICIAN ASSISTANT

## 2022-02-22 PROCEDURE — 3074F SYST BP LT 130 MM HG: CPT | Mod: CPTII,S$GLB,, | Performed by: PHYSICIAN ASSISTANT

## 2022-02-22 PROCEDURE — 99214 PR OFFICE/OUTPT VISIT, EST, LEVL IV, 30-39 MIN: ICD-10-PCS | Mod: S$GLB,,, | Performed by: PHYSICIAN ASSISTANT

## 2022-02-22 PROCEDURE — 1160F PR REVIEW ALL MEDS BY PRESCRIBER/CLIN PHARMACIST DOCUMENTED: ICD-10-PCS | Mod: CPTII,S$GLB,, | Performed by: PHYSICIAN ASSISTANT

## 2022-02-22 PROCEDURE — 99999 PR PBB SHADOW E&M-EST. PATIENT-LVL IV: CPT | Mod: PBBFAC,,, | Performed by: PHYSICIAN ASSISTANT

## 2022-02-22 PROCEDURE — 90471 HEPATITIS A HEPATITIS B COMBINED VACCINE IM: ICD-10-PCS | Mod: S$GLB,,, | Performed by: PHYSICIAN ASSISTANT

## 2022-02-22 PROCEDURE — 1159F MED LIST DOCD IN RCRD: CPT | Mod: CPTII,S$GLB,, | Performed by: PHYSICIAN ASSISTANT

## 2022-02-22 PROCEDURE — 99999 PR PBB SHADOW E&M-EST. PATIENT-LVL I: ICD-10-PCS | Mod: PBBFAC,,,

## 2022-02-22 PROCEDURE — 81256 HFE GENE: CPT | Performed by: PHYSICIAN ASSISTANT

## 2022-02-22 PROCEDURE — 99999 PR PBB SHADOW E&M-EST. PATIENT-LVL IV: ICD-10-PCS | Mod: PBBFAC,,, | Performed by: PHYSICIAN ASSISTANT

## 2022-02-22 PROCEDURE — 3008F BODY MASS INDEX DOCD: CPT | Mod: CPTII,S$GLB,, | Performed by: PHYSICIAN ASSISTANT

## 2022-02-22 PROCEDURE — 99214 OFFICE O/P EST MOD 30 MIN: CPT | Mod: S$GLB,,, | Performed by: PHYSICIAN ASSISTANT

## 2022-02-22 PROCEDURE — 81596 NFCT DS CHRNC HCV 6 ASSAYS: CPT | Performed by: PHYSICIAN ASSISTANT

## 2022-02-22 PROCEDURE — 99999 PR PBB SHADOW E&M-EST. PATIENT-LVL I: CPT | Mod: PBBFAC,,,

## 2022-02-22 PROCEDURE — 3074F PR MOST RECENT SYSTOLIC BLOOD PRESSURE < 130 MM HG: ICD-10-PCS | Mod: CPTII,S$GLB,, | Performed by: PHYSICIAN ASSISTANT

## 2022-02-22 PROCEDURE — 3008F PR BODY MASS INDEX (BMI) DOCUMENTED: ICD-10-PCS | Mod: CPTII,S$GLB,, | Performed by: PHYSICIAN ASSISTANT

## 2022-02-22 PROCEDURE — 80076 HEPATIC FUNCTION PANEL: CPT | Performed by: PHYSICIAN ASSISTANT

## 2022-02-22 PROCEDURE — 90636 HEPATITIS A HEPATITIS B COMBINED VACCINE IM: ICD-10-PCS | Mod: S$GLB,,, | Performed by: PHYSICIAN ASSISTANT

## 2022-02-22 PROCEDURE — 90636 HEP A/HEP B VACC ADULT IM: CPT | Mod: S$GLB,,, | Performed by: PHYSICIAN ASSISTANT

## 2022-02-22 PROCEDURE — 1159F PR MEDICATION LIST DOCUMENTED IN MEDICAL RECORD: ICD-10-PCS | Mod: CPTII,S$GLB,, | Performed by: PHYSICIAN ASSISTANT

## 2022-02-22 PROCEDURE — 1160F RVW MEDS BY RX/DR IN RCRD: CPT | Mod: CPTII,S$GLB,, | Performed by: PHYSICIAN ASSISTANT

## 2022-02-22 PROCEDURE — 3078F DIAST BP <80 MM HG: CPT | Mod: CPTII,S$GLB,, | Performed by: PHYSICIAN ASSISTANT

## 2022-02-22 PROCEDURE — 3078F PR MOST RECENT DIASTOLIC BLOOD PRESSURE < 80 MM HG: ICD-10-PCS | Mod: CPTII,S$GLB,, | Performed by: PHYSICIAN ASSISTANT

## 2022-02-22 NOTE — PROGRESS NOTES
Two person identification name, d.o.b with verbal feedback.  Aseptic technique used.  Administration Twinrix hep A ( 7/28/20)  Hep B ( 8/15/19)  vaccine to the  R Deltoid IM given.  Tolerated well.  waited 15 min.  VIS given/mp

## 2022-02-22 NOTE — PROGRESS NOTES
"HEPATOLOGY CLINIC VISIT NOTE - HCV clinic  CHIEF COMPLAINT: Hepatitis C     HISTORY     This is a 56 y.o. White male with notably elevated liver enzymes, chronic hepatitis C and alcohol abuse (self described alcoholic), here for f/u    Labs since last visit:   Lacking immunity to HAV & HBV   HIV screen neg   AFP normal   Transaminases trended down:  -> 89,  -> 171    Serology for other causes chronic liver disease:  · Peth 462  · Elevated Fe studies: Ferritin 799, Fe 201, Fe sat 57%   · WINSOME 1:160, SMA 1:40, IgG 1637, AMA neg  · Normal Ceruloplasmin, A1AT    Feels okay  Denies jaundice, dark urine, hematemesis, melena, slowed mentation, abdominal distention.       HCV history:  Originally diagnosed 15 years; has never received specialty care until now   Risks for HCV:  Blood transfusion - no    IVDA - no    Intranasal drug use - yes    Tattoos - yes  - Treatment naive  - Genotype 2b    Liver staging:  No formal liver staging  · Several imaging studies this year - unremarkable liver. No SM  · Labs w/ well preserved liver function   Persistent AST/ALT elevation, ALT>AST    Intermittent TBili elevation up to 1.7   Normal plt typically > 200        PMH, PSH, PROBLEM LIST, SOCIAL HX, FAMILY HX, MEDS, ALLERGIES   Reviewed in Epic  FAMILY HX: neg for liver diease  Works installing duct work  Alcohol - ~ 30 yr hx heavy use "I'm an alcoholic"  He knows there are "good things behind AA doors if he wants it"  Drugs - prior IN drug use      ROS:   Review of Systems   Constitutional: Negative for fever.   Cardiovascular: Negative for chest pain and leg swelling.   Gastrointestinal: Negative for abdominal pain.   Musculoskeletal: Negative for joint pain.   Skin: Negative for rash.   Psychiatric/Behavioral: Negative for memory loss.       PHYSICAL EXAM:  Friendly White male, in no acute distress; alert and oriented to person, place and time  VITALS: reviewed  HEENT: Sclerae anicteric.   LUNGS: Normal respiratory " effort.   ABDOMEN: Flat, soft, nontender.   SKIN: Warm and dry. No jaundice, No obvious rashes. (+) tattoo  NEURO/PSYCH: Normal gate. Memory intact. Thought and speech pattern appropriate. Behavior normal. No depression or anxiety noted.    PERTINENT DIAGNOSTIC RESULTS     Lab Results   Component Value Date    WBC 8.36 12/08/2021    HGB 15.2 12/08/2021     12/08/2021     Lab Results   Component Value Date    INR 1.0 01/11/2022     Lab Results   Component Value Date    AST 89 (H) 01/11/2022     (H) 01/11/2022    BILITOT 1.3 (H) 01/11/2022    ALBUMIN 3.8 01/11/2022    ALKPHOS 69 01/11/2022    CREATININE 0.7 01/11/2022    BUN 17 01/11/2022     01/11/2022    K 4.1 01/11/2022    AFP 7.9 01/11/2022         ASSESSMENT     56 y.o. White male with:  1. CHRONIC HEPATITIS C, GENOTYPE 2b - treatment naive  -- Lacking Immunity to HAV & HBV  2. ELEVATED TRANSAMINASES  -- downward trend since initial visit  -- obviously HCV and alcohol contributing, need to r/o other causes  3. ELEVATED FE STUDIES  -- r/o Hemochromatosis but I suspect Fe overload 2/2 HCV & alcohol  4. ELEVATED AUTOANTIBODIES  -- r/o autoimmune hepatitis but suspect they're elevated due to HCV, especially in light of recent downward trend  5. ALCOHOL ABUSE  -- peth > 400    PLAN     1. Twinrix vaccine series - will schedule in [place]  2. FibroScan  3. HH DNA analysis, LFT, FibroSURE  4. F/U visit. Goal of HCV rx  5. Recommended alcohol cessation    Will continue efforts to stage liver noninvasively, prior to HCV rx.   Hopefully HCV cure will help clarify presence / absence of HH and AIH, but if he still drinks afterwards he certainly may have continued transaminase elevation after HCV cure so potentially bx will be needed.       __________________________________________________________________    Duration of encounter: 34 min  This includes face-to-face time and non face-to-face time preparing to see the patient (eg, review of tests),  obtaining and/or reviewing separately obtained history, documenting clinical information in the electronic or other health record, independently interpreting resultsand communicating results to the patient/family/caregiver, or care coordination.

## 2022-02-25 LAB
A2 MACROGLOB SERPL-MCNC: 497 MG/DL (ref 100–280)
ALT SERPL W P-5'-P-CCNC: 349 U/L (ref 7–55)
APO A-I SERPL-MCNC: 139 MG/DL
BILIRUB SERPL-MCNC: 1.1 MG/DL
BIOPREDICTIVE SERIAL NUMBER: ABNORMAL
FIBROSIS STAGE SERPL QL: ABNORMAL
FIBROTEST INTERPRETATION: ABNORMAL
FIBROTEST-ACTITEST COMMENT: ABNORMAL
GGT SERPL-CCNC: 144 U/L (ref 8–61)
HAPTOGLOB SERPL-MCNC: 147 MG/DL (ref 30–200)
LIVER FIBR SCORE SERPL CALC.FIBROSURE: 0.89
NECROINFLAMMAT INTERP: ABNORMAL
NECROINFLAMMATORY ACT GRADE SERPL QL: ABNORMAL
NECROINFLAMMATORY ACT SCORE SERPL: 0.96

## 2022-03-02 ENCOUNTER — PROCEDURE VISIT (OUTPATIENT)
Dept: HEPATOLOGY | Facility: CLINIC | Age: 57
End: 2022-03-02
Payer: COMMERCIAL

## 2022-03-02 DIAGNOSIS — B18.2 CHRONIC HEPATITIS C WITHOUT HEPATIC COMA: ICD-10-CM

## 2022-03-02 LAB
GENETICIST REVIEW: NORMAL
HFE GENE MUT ANL BLD/T: NORMAL
HFE RELEASED BY: NORMAL
HFE RESULT SUMMARY: NORMAL
REF LAB TEST METHOD: NORMAL
SPECIMEN SOURCE: NORMAL
SPECIMEN,  HEMOCHROMATOSIS: NORMAL

## 2022-03-02 PROCEDURE — 91200 LIVER ELASTOGRAPHY: CPT | Mod: S$GLB,,, | Performed by: PHYSICIAN ASSISTANT

## 2022-03-02 PROCEDURE — 91200 FIBROSCAN (VIBRATION CONTROLLED TRANSIENT ELASTOGRAPHY): ICD-10-PCS | Mod: S$GLB,,, | Performed by: PHYSICIAN ASSISTANT

## 2022-03-03 ENCOUNTER — TELEPHONE (OUTPATIENT)
Dept: HEPATOLOGY | Facility: CLINIC | Age: 57
End: 2022-03-03
Payer: COMMERCIAL

## 2022-03-03 NOTE — PROCEDURES
FibroScan (Vibration Controlled Transient Elastography)    Date/Time: 3/2/2022 4:00 PM  Performed by: Jennifer B. Scheuermann, PA  Authorized by: Jennifer B. Scheuermann, PA     Diagnosis:  HCV    Probe:  M    Universal Protocol: Patient's identity, procedure and site were verified, confirmatory pause was performed.  Discussed procedure including risks and potential complications.  Questions answered.  Patient verbalizes understanding and wishes to proceed with VCTE.     Procedure: After providing explanations of the procedure, patient was placed in the supine position with right arm in maximum abduction to allow optimal exposure of right lateral abdomen.  Patient was briefly assessed, Testing was performed in the mid-axillary location, 50Hz Shear Wave pulses were applied and the resulting Shear Wave and Propagation Speed detected with a 3.5 MHz ultrasonic signal, using the FibroScan probe, Skin to liver capsule distance and liver parenchyma were accessed during the entire examination with the FibroScan probe, Patient was instructed to breathe normally and to abstain from sudden movements during the procedure, allowing for random measurements of liver stiffness. At least 10 Shear Waves were produced, Individual measurements of each Shear Wave were calculated.  Patient tolerated the procedure well with no complications.  Meets discharge criteria as was dismissed.  Rates pain 0 out of 10.  Patient will follow up with ordering provider to review results.      Findings  Median liver stiffness score:  9.2  CAP Reading: dB/m:  198    IQR/med %:  14  Fibrosis: F2-3.  Steatosis interpretation is based on controlled attenuation parameter - (dB/m).    Steatosis Grade:  <S1

## 2022-03-03 NOTE — TELEPHONE ENCOUNTER
pls tell pt his recent testing again shows there is significant inflammation in the liver and it appears he has a lot of scarring as well.  Very important that he try to quit alcohol.  Keep appt later this month to discuss HCV treatment.   At that time we will review all of the results in more detail

## 2022-03-22 ENCOUNTER — EPISODE CHANGES (OUTPATIENT)
Dept: HEPATOLOGY | Facility: CLINIC | Age: 57
End: 2022-03-22

## 2022-03-22 ENCOUNTER — OFFICE VISIT (OUTPATIENT)
Dept: HEPATOLOGY | Facility: CLINIC | Age: 57
End: 2022-03-22
Payer: COMMERCIAL

## 2022-03-22 VITALS
WEIGHT: 159.81 LBS | TEMPERATURE: 99 F | HEART RATE: 91 BPM | BODY MASS INDEX: 22.29 KG/M2 | SYSTOLIC BLOOD PRESSURE: 135 MMHG | DIASTOLIC BLOOD PRESSURE: 81 MMHG

## 2022-03-22 DIAGNOSIS — R74.8 ABNORMAL TRANSAMINASES: ICD-10-CM

## 2022-03-22 DIAGNOSIS — B18.2 CHRONIC HEPATITIS C WITHOUT HEPATIC COMA: Primary | ICD-10-CM

## 2022-03-22 DIAGNOSIS — K74.02 ADVANCED HEPATIC FIBROSIS: ICD-10-CM

## 2022-03-22 PROCEDURE — 3075F PR MOST RECENT SYSTOLIC BLOOD PRESS GE 130-139MM HG: ICD-10-PCS | Mod: CPTII,S$GLB,, | Performed by: PHYSICIAN ASSISTANT

## 2022-03-22 PROCEDURE — 99999 PR PBB SHADOW E&M-EST. PATIENT-LVL III: ICD-10-PCS | Mod: PBBFAC,,, | Performed by: PHYSICIAN ASSISTANT

## 2022-03-22 PROCEDURE — 3079F PR MOST RECENT DIASTOLIC BLOOD PRESSURE 80-89 MM HG: ICD-10-PCS | Mod: CPTII,S$GLB,, | Performed by: PHYSICIAN ASSISTANT

## 2022-03-22 PROCEDURE — 3075F SYST BP GE 130 - 139MM HG: CPT | Mod: CPTII,S$GLB,, | Performed by: PHYSICIAN ASSISTANT

## 2022-03-22 PROCEDURE — 1160F PR REVIEW ALL MEDS BY PRESCRIBER/CLIN PHARMACIST DOCUMENTED: ICD-10-PCS | Mod: CPTII,S$GLB,, | Performed by: PHYSICIAN ASSISTANT

## 2022-03-22 PROCEDURE — 1159F PR MEDICATION LIST DOCUMENTED IN MEDICAL RECORD: ICD-10-PCS | Mod: CPTII,S$GLB,, | Performed by: PHYSICIAN ASSISTANT

## 2022-03-22 PROCEDURE — 1160F RVW MEDS BY RX/DR IN RCRD: CPT | Mod: CPTII,S$GLB,, | Performed by: PHYSICIAN ASSISTANT

## 2022-03-22 PROCEDURE — 3079F DIAST BP 80-89 MM HG: CPT | Mod: CPTII,S$GLB,, | Performed by: PHYSICIAN ASSISTANT

## 2022-03-22 PROCEDURE — 99214 OFFICE O/P EST MOD 30 MIN: CPT | Mod: S$GLB,,, | Performed by: PHYSICIAN ASSISTANT

## 2022-03-22 PROCEDURE — 3008F BODY MASS INDEX DOCD: CPT | Mod: CPTII,S$GLB,, | Performed by: PHYSICIAN ASSISTANT

## 2022-03-22 PROCEDURE — 99999 PR PBB SHADOW E&M-EST. PATIENT-LVL III: CPT | Mod: PBBFAC,,, | Performed by: PHYSICIAN ASSISTANT

## 2022-03-22 PROCEDURE — 99214 PR OFFICE/OUTPT VISIT, EST, LEVL IV, 30-39 MIN: ICD-10-PCS | Mod: S$GLB,,, | Performed by: PHYSICIAN ASSISTANT

## 2022-03-22 PROCEDURE — 3008F PR BODY MASS INDEX (BMI) DOCUMENTED: ICD-10-PCS | Mod: CPTII,S$GLB,, | Performed by: PHYSICIAN ASSISTANT

## 2022-03-22 PROCEDURE — 1159F MED LIST DOCD IN RCRD: CPT | Mod: CPTII,S$GLB,, | Performed by: PHYSICIAN ASSISTANT

## 2022-03-22 RX ORDER — VELPATASVIR AND SOFOSBUVIR 100; 400 MG/1; MG/1
1 TABLET, FILM COATED ORAL DAILY
Qty: 28 TABLET | Refills: 2 | OUTPATIENT
Start: 2022-03-22 | End: 2022-05-03

## 2022-03-22 RX ORDER — ROSUVASTATIN CALCIUM 10 MG/1
10 TABLET, COATED ORAL DAILY
Qty: 90 TABLET | Refills: 0 | Status: SHIPPED | OUTPATIENT
Start: 2022-03-22 | End: 2022-09-12 | Stop reason: SDUPTHER

## 2022-03-22 NOTE — PROGRESS NOTES
"HEPATOLOGY CLINIC VISIT NOTE - HCV clinic  CHIEF COMPLAINT: Hepatitis C     HISTORY     This is a 56 y.o. White male with notably elevated liver enzymes, chronic hepatitis C and alcohol abuse (self described alcoholic), here for f/u    Interval history:  Began twinrix 2/22/22, 2nd dose today  Found to have at least moderate liver fibrosis   HH DNA analysis: 1 copy C282Y, no H63D or S65C    Feels okay  Denies jaundice, dark urine, hematemesis, melena, slowed mentation, abdominal distention.       HCV history:  Originally diagnosed 15 years; has never received specialty care until now   Risks for HCV:  Blood transfusion - no    IVDA - no    Intranasal drug use - yes    Tattoos - yes  - Treatment naive  - Genotype 2b    Liver staging:  FibroScan 3/2022 - kPa 9.2, F2-3 (, no steatosis)  FibroSURE 2/2022 - A3, F4  · Several imaging studies this year - unremarkable liver. No SM  · Labs w/ well preserved liver function   Persistent AST/ALT elevation, ALT>AST    Intermittent TBili elevation up to 1.7   Normal plt typically > 200     Notable transaminase elevation: serology for other causes liver disease:  · Peth 462  · Elevated Fe studies: Ferritin 799, Fe 201, Fe sat 57%.    · WINSOME 1:160, SMA 1:40, IgG 1637, AMA neg  · Normal Ceruloplasmin, A1AT       PMH, PSH, PROBLEM LIST, SOCIAL HX, FAMILY HX, MEDS, ALLERGIES   Reviewed in Epic  FAMILY HX: neg for liver diease  Works installing duct work  Alcohol - ~ 30 yr hx heavy use "I'm an alcoholic"  He knows there are "good things behind AA doors if he wants it"  Drugs - prior IN drug use      ROS:   Review of Systems   Cardiovascular: Negative for chest pain and leg swelling.   Gastrointestinal: Negative for abdominal pain.   Musculoskeletal: Negative for joint pain.   Skin: Negative for rash.   Psychiatric/Behavioral: Negative for memory loss.       PHYSICAL EXAM:  Friendly White male, in no acute distress; alert and oriented to person, place and time  VITALS: " reviewed  HEENT: Sclerae anicteric.   LUNGS: Normal respiratory effort.   ABDOMEN: Flat, soft, nontender.   SKIN: Warm and dry. No jaundice, No obvious rashes. (+) tattoo  NEURO/PSYCH: Normal gate. Memory intact. Thought and speech pattern appropriate. Behavior normal. No depression or anxiety noted.    PERTINENT DIAGNOSTIC RESULTS     Lab Results   Component Value Date    WBC 8.36 12/08/2021    HGB 15.2 12/08/2021     12/08/2021     Lab Results   Component Value Date    INR 1.0 01/11/2022     Lab Results   Component Value Date     (H) 02/22/2022     (H) 02/22/2022    BILITOT 1.4 (H) 02/22/2022    ALBUMIN 3.5 02/22/2022    ALKPHOS 73 02/22/2022    CREATININE 0.7 01/11/2022    BUN 17 01/11/2022     01/11/2022    K 4.1 01/11/2022    AFP 7.9 01/11/2022         ASSESSMENT     56 y.o. White male with:  1. CHRONIC HEPATITIS C, GENOTYPE 2b - treatment naive  -- Lacking Immunity to HAV & HBV -> vaccine series underway    2. ELEVATED TRANSAMINASES  -- obviously HCV and alcohol contributing  -- elevated Fe studies: r/o HH but I think less likely. Suspect Fe overload 2/2 HCV & alcohol  -- elevated autoantibodies: r/o AIH but I think less likely. Suspect up due to HCV  **will need to eval further after HCV cured    3. ALCOHOL ABUSE  -- peth > 400    4. MODERATE TO ADVANCED LIVER FIBROSIS  -- FibroScan F2-3  -- FibroSURE F4 - ?? Falsely elevated due to notable transaminase elevation  -- considering he may have ongoing risks of alcohol use after HCV cured, will likely benefit from longterm monitoring    PLAN     1. Obtain authorization to treat HCV with Epclusa x 12 weeks  -- Rx will be routed to [facility]  -- Patient will notify me of exact treatment start date so appropriate lab f/u can be scheduled.  2. Reduce crestor to 10mg for duration of HCV rx  3. AFP during treatment  4. Proceed w/ twinrix  5. D/c alcohol    Hopefully HCV cure will help clarify presence / absence of HH and AIH,  but if he still drinks afterwards he may have continued transaminase elevation after HCV cure so potentially bx will be needed.     ______________________________________________________________________________  EDUCATION:  HCV RX  Discussed goal of HCV eradication to prevent progression of liver disease.  Discussed use of Epclusa daily x 12 weeks w/ potential side effects of fatigue and headache.     Reviewed limitations on acid suppressant medications due to DDI w/ Epclusa:  -- Antacids, H2 Receptor Antagonist, PPI - Pt not taking  Patient instructed to contact me if experiencing acid related symptoms so further recommendations can be made regarding acid suppression therapy.      Reviewed limitations on statin therapy and Epclusa:  -- Patient taking crestor, will reduce to 10mg while on HCV rx    Herbal / alternative therapies must be discontinued  Discussed importance of medication adherence and risk of treatment failure / viral resistance if not adherent. Pt has verbalized understanding.    LIVER FIBROSIS  Discussed concern for moderate to advanced fibrosis and importance of fully clarifying causes and goal of preventing further progression which could lead to liver decompensation and failure      Duration of encounter: 32 min  This includes face-to-face time and non face-to-face time preparing to see the patient (eg, review of tests), obtaining and/or reviewing separately obtained history, documenting clinical information in the electronic or other health record, independently interpreting resultsand communicating results to the patient/family/caregiver, or care coordination.

## 2022-03-24 ENCOUNTER — PATIENT OUTREACH (OUTPATIENT)
Dept: ADMINISTRATIVE | Facility: OTHER | Age: 57
End: 2022-03-24
Payer: COMMERCIAL

## 2022-03-24 ENCOUNTER — CLINICAL SUPPORT (OUTPATIENT)
Dept: INTERNAL MEDICINE | Facility: CLINIC | Age: 57
End: 2022-03-24
Payer: COMMERCIAL

## 2022-03-24 DIAGNOSIS — Z23 NEED FOR HEPATITIS VACCINATION: ICD-10-CM

## 2022-03-24 PROCEDURE — 90636 HEP A/HEP B VACC ADULT IM: CPT | Mod: S$GLB,,, | Performed by: PHYSICIAN ASSISTANT

## 2022-03-24 PROCEDURE — 90471 HEPATITIS A HEPATITIS B COMBINED VACCINE IM: ICD-10-PCS | Mod: S$GLB,,, | Performed by: PHYSICIAN ASSISTANT

## 2022-03-24 PROCEDURE — 90471 IMMUNIZATION ADMIN: CPT | Mod: S$GLB,,, | Performed by: PHYSICIAN ASSISTANT

## 2022-03-24 PROCEDURE — 90636 HEPATITIS A HEPATITIS B COMBINED VACCINE IM: ICD-10-PCS | Mod: S$GLB,,, | Performed by: PHYSICIAN ASSISTANT

## 2022-03-24 NOTE — PROGRESS NOTES
Chart was reviewed for overdue Proactive [facility] Encounters (DEMETRIUS)  topics  Updates were requested from care everywhere  Health Maintenance has been updated  LINKS immunization registry triggered

## 2022-03-24 NOTE — PROGRESS NOTES
Two person identification name, d.o.b with verbal feedback.  Aseptic technique used.  Administration #2 Twinrix hep A ( 7/28/20)  Hep B ( 8/15/19)  vaccine to the  R Deltoid IM given.  Tolerated well.  waited 15 min.  VIS given/mp

## 2022-03-30 ENCOUNTER — OFFICE VISIT (OUTPATIENT)
Dept: PULMONOLOGY | Facility: CLINIC | Age: 57
End: 2022-03-30
Payer: COMMERCIAL

## 2022-03-30 ENCOUNTER — HOSPITAL ENCOUNTER (OUTPATIENT)
Dept: PULMONOLOGY | Facility: HOSPITAL | Age: 57
Discharge: HOME | End: 2022-03-30
Attending: NURSE PRACTITIONER
Payer: COMMERCIAL

## 2022-03-30 VITALS
WEIGHT: 158.5 LBS | OXYGEN SATURATION: 96 % | SYSTOLIC BLOOD PRESSURE: 136 MMHG | DIASTOLIC BLOOD PRESSURE: 86 MMHG | BODY MASS INDEX: 22.19 KG/M2 | HEIGHT: 71 IN | HEART RATE: 73 BPM

## 2022-03-30 DIAGNOSIS — J44.9 CHRONIC OBSTRUCTIVE PULMONARY DISEASE, UNSPECIFIED COPD TYPE: Primary | ICD-10-CM

## 2022-03-30 DIAGNOSIS — J43.2 CENTRILOBULAR EMPHYSEMA: ICD-10-CM

## 2022-03-30 DIAGNOSIS — R91.8 LUNG NODULE, MULTIPLE: ICD-10-CM

## 2022-03-30 PROCEDURE — 1160F RVW MEDS BY RX/DR IN RCRD: CPT | Mod: CPTII,S$GLB,, | Performed by: NURSE PRACTITIONER

## 2022-03-30 PROCEDURE — 3008F BODY MASS INDEX DOCD: CPT | Mod: CPTII,S$GLB,, | Performed by: NURSE PRACTITIONER

## 2022-03-30 PROCEDURE — 94729 DIFFUSING CAPACITY: CPT | Mod: 26,,, | Performed by: INTERNAL MEDICINE

## 2022-03-30 PROCEDURE — 94727 GAS DIL/WSHOT DETER LNG VOL: CPT

## 2022-03-30 PROCEDURE — 94060 EVALUATION OF WHEEZING: CPT

## 2022-03-30 PROCEDURE — 3079F DIAST BP 80-89 MM HG: CPT | Mod: CPTII,S$GLB,, | Performed by: NURSE PRACTITIONER

## 2022-03-30 PROCEDURE — 3075F SYST BP GE 130 - 139MM HG: CPT | Mod: CPTII,S$GLB,, | Performed by: NURSE PRACTITIONER

## 2022-03-30 PROCEDURE — 1160F PR REVIEW ALL MEDS BY PRESCRIBER/CLIN PHARMACIST DOCUMENTED: ICD-10-PCS | Mod: CPTII,S$GLB,, | Performed by: NURSE PRACTITIONER

## 2022-03-30 PROCEDURE — 99999 PR PBB SHADOW E&M-EST. PATIENT-LVL IV: ICD-10-PCS | Mod: PBBFAC,,, | Performed by: NURSE PRACTITIONER

## 2022-03-30 PROCEDURE — 94729 DIFFUSING CAPACITY: CPT

## 2022-03-30 PROCEDURE — 99999 PR PBB SHADOW E&M-EST. PATIENT-LVL IV: CPT | Mod: PBBFAC,,, | Performed by: NURSE PRACTITIONER

## 2022-03-30 PROCEDURE — 1159F MED LIST DOCD IN RCRD: CPT | Mod: CPTII,S$GLB,, | Performed by: NURSE PRACTITIONER

## 2022-03-30 PROCEDURE — 94060 PR EVAL OF BRONCHOSPASM: ICD-10-PCS | Mod: 26,,, | Performed by: INTERNAL MEDICINE

## 2022-03-30 PROCEDURE — 3075F PR MOST RECENT SYSTOLIC BLOOD PRESS GE 130-139MM HG: ICD-10-PCS | Mod: CPTII,S$GLB,, | Performed by: NURSE PRACTITIONER

## 2022-03-30 PROCEDURE — 99213 PR OFFICE/OUTPT VISIT, EST, LEVL III, 20-29 MIN: ICD-10-PCS | Mod: S$GLB,,, | Performed by: NURSE PRACTITIONER

## 2022-03-30 PROCEDURE — 1159F PR MEDICATION LIST DOCUMENTED IN MEDICAL RECORD: ICD-10-PCS | Mod: CPTII,S$GLB,, | Performed by: NURSE PRACTITIONER

## 2022-03-30 PROCEDURE — 94729 PR C02/MEMBANE DIFFUSE CAPACITY: ICD-10-PCS | Mod: 26,,, | Performed by: INTERNAL MEDICINE

## 2022-03-30 PROCEDURE — 3008F PR BODY MASS INDEX (BMI) DOCUMENTED: ICD-10-PCS | Mod: CPTII,S$GLB,, | Performed by: NURSE PRACTITIONER

## 2022-03-30 PROCEDURE — 99213 OFFICE O/P EST LOW 20 MIN: CPT | Mod: S$GLB,,, | Performed by: NURSE PRACTITIONER

## 2022-03-30 PROCEDURE — 94727 PR PULM FUNCTION TEST BY GAS: ICD-10-PCS | Mod: 26,,, | Performed by: INTERNAL MEDICINE

## 2022-03-30 PROCEDURE — 3079F PR MOST RECENT DIASTOLIC BLOOD PRESSURE 80-89 MM HG: ICD-10-PCS | Mod: CPTII,S$GLB,, | Performed by: NURSE PRACTITIONER

## 2022-03-30 PROCEDURE — 94060 EVALUATION OF WHEEZING: CPT | Mod: 26,,, | Performed by: INTERNAL MEDICINE

## 2022-03-30 PROCEDURE — 94727 GAS DIL/WSHOT DETER LNG VOL: CPT | Mod: 26,,, | Performed by: INTERNAL MEDICINE

## 2022-03-30 RX ORDER — ALBUTEROL SULFATE 90 UG/1
2 AEROSOL, METERED RESPIRATORY (INHALATION) EVERY 4 HOURS PRN
Qty: 18 G | Refills: 11 | Status: SHIPPED | OUTPATIENT
Start: 2022-03-30 | End: 2022-11-09

## 2022-03-30 RX ORDER — BUDESONIDE, GLYCOPYRROLATE, AND FORMOTEROL FUMARATE 160; 9; 4.8 UG/1; UG/1; UG/1
2 AEROSOL, METERED RESPIRATORY (INHALATION) 2 TIMES DAILY
Qty: 10.7 G | Refills: 11 | Status: SHIPPED | OUTPATIENT
Start: 2022-03-30 | End: 2022-11-09

## 2022-03-30 NOTE — PROGRESS NOTES
3/30/2022    Anatoly Hatch III  New Patient Consult    Chief Complaint   Patient presents with    3m f/u     Results       HPI:   3/30/2022- currently smoking 1 pack daily, did not fill albuterol inhaler or nebulizer machine.   No complaint of shortness of breath.     Complaint of cough- mild, daily, worse after smoking, productive clear mucous.       12/30/2021- Referred by PCP, complaint of cough- onset years, stable, worse in mornings, productive cream colored mucous, quarter size. 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, no known Asbestosis exposure, Smoking Hx: currently smoking 1 pack daily, 30 pack years  Family Hx: no Lung Cancer, no COPD, no Asthma  Medical Hx: no previous pneumonia ; no previous shoulder/chest surgery          The chief compliant  problem is stable  PFSH:  Past Medical History:   Diagnosis Date    Abdominal abscess 09/21/2017    Appendicitis 09/21/2017    Hepatitis 2005    NSTEMI (non-ST elevated myocardial infarction) 8/4/2021         Past Surgical History:   Procedure Laterality Date    ANGIOGRAM, CORONARY, WITH LEFT HEART CATHETERIZATION N/A 8/5/2021    Procedure: Angiogram, Coronary, with Left Heart Cath;  Surgeon: Erasmo Slaughter MD;  [facility]ATH[facility]EP LAB;  Service: Cardiology;  Laterality: N/A;    APPENDECTOMY  09/21/2017    done at [facility] by Dr. Vasquez- Gama    SMALL INTESTINE SURGERY  02/25/2018    SB Resection, drng abscess - Dr. Mar - [facility]     Social History     Tobacco Use    Smoking status: Current Every Day Smoker     Packs/day: 1.00     Years: 30.00     Pack years: 30.00     Types: Cigarettes    Smokeless tobacco: Never Used   Substance Use Topics    Alcohol use: Yes     Comment: 4 glasses of whiskey daily    Drug use: No     No family history on file.  Review of patient's allergies indicates:  No Known  "Allergies  I have reviewed past medical, family, and social history. I have reviewed previous nurse notes.    Performance Status:The patient's activity level is functions out of house.      Review of Systems:  a review of eleven systems covering constitutional, Eye, HEENT, Psych, Respiratory, Cardiac, GI, , Musculoskeletal, Endocrine, Dermatologic was negative except for pertinent findings as listed ABOVE and below: pertinent positive as above, rest is good  Cough,          Exam:Comprehensive exam done. /86 (BP Location: Right arm, Patient Position: Sitting, BP Method: Medium (Automatic))   Pulse 73   Ht 5' 11" (1.803 m)   Wt 71.9 kg (158 lb 8.2 oz)   SpO2 96% Comment: on room air at rest  BMI 22.11 kg/m²   Exam included Vitals as listed, and patient's appearance and affect and alertness and mood, oral exam for yeast and hygiene and pharynx lesions and Mallapatti (M) score, neck with inspection for jvd and masses and thyroid abnormalities and lymph nodes (supraclavicular and infraclavicular nodes and axillary also examined and noted if abn), chest exam included symmetry and effort and fremitus and percussion and auscultation, cardiac exam included rhythm and gallops and murmur and rubs and jvd and edema, abdominal exam for mass and hepatosplenomegaly and tenderness and hernias and bowel sounds, Musculoskeletal exam with muscle tone and posture and mobility/gait and  strength, and skin for rashes and cyanosis and pallor and turgor, extremity for clubbing.  Findings were normal except for pertinent findings listed below:   M2. BS clear      Radiographs (ct chest and cxr) reviewed: view by direct vision   CT Chest Lung Screening Low Dose 11/15/2021    There are abnormal opacities that require further evaluation. The largest opacity in the right lung appears solid and measures 0.4 cm on series 4, image 213. The largest opacity in the left lung appears solid and measures 0.3 cm on series 4, image 342. " The lungs show mild findings suggesting emphysema.     X-Ray Chest AP Portable 9/10/21 Lungs clear     Labs reviewed       Lab Results   Component Value Date    WBC 8.36 12/08/2021    RBC 4.69 12/08/2021    HGB 15.2 12/08/2021    HCT 44.9 12/08/2021    MCV 96 12/08/2021    MCH 32.4 (H) 12/08/2021    MCHC 33.9 12/08/2021    RDW 12.5 12/08/2021     12/08/2021    MPV 10.8 12/08/2021    GRAN 3.6 12/08/2021    GRAN 43.2 12/08/2021    LYMPH 3.7 12/08/2021    LYMPH 43.9 12/08/2021    MONO 0.8 12/08/2021    MONO 10.0 12/08/2021    EOS 0.2 12/08/2021    BASO 0.07 12/08/2021    EOSINOPHIL 2.0 12/08/2021    BASOPHIL 0.8 12/08/2021       PFT reviewed  Pulmonary Functions Testing Results:  Moderate COPD with low diffusion rate      Plan:  Clinical impression is apparently straight forward and impression with management as below    Anatoly was seen today for 3m f/u  and results.    Diagnoses and all orders for this visit:    Chronic obstructive pulmonary disease, unspecified COPD type  -     budesonide-glycopyr-formoterol (BREZTRI AEROSPHERE) 160-9-4.8 mcg/actuation HFAA; Inhale 2 puffs into the lungs 2 (two) times a day.  -     albuterol (VENTOLIN HFA) 90 mcg/actuation inhaler; Inhale 2 puffs into the lungs every 4 (four) hours as needed for Wheezing or Shortness of Breath. Rescue    Lung nodule, multiple  -     CT Chest Without Contrast; Future        Follow up in about 8 months (around 11/22/2022), or if symptoms worsen or fail to improve.    Discussed with patient above for education the following:      Patient Instructions   Recommend [facility] smoking cessation program    Try new medication Breztri 2 puff twice daily every day, rinse mouth after using due to risk for thrush if mouth or tongue has white sores contact clinic    Will repeat CT of chest in November to evaluate small nodules seen previously

## 2022-03-31 LAB
BRPFT: NORMAL
DLCO ADJ PRE: 19.39 ML/(MIN*MMHG)
DLCO SINGLE BREATH LLN: 23.35
DLCO SINGLE BREATH PRE REF: 64 %
DLCO SINGLE BREATH REF: 30.28
DLCOC SBVA LLN: 2.99
DLCOC SBVA PRE REF: 78.9 %
DLCOC SBVA REF: 4.15
DLCOC SINGLE BREATH LLN: 23.35
DLCOC SINGLE BREATH PRE REF: 64 %
DLCOC SINGLE BREATH REF: 30.28
DLCOVA LLN: 2.99
DLCOVA PRE REF: 78.9 %
DLCOVA PRE: 3.27 ML/(MIN*MMHG*L)
DLCOVA REF: 4.15
DLVAADJ PRE: 3.27 ML/(MIN*MMHG*L)
ERVN2 LLN: -16448.76
ERVN2 PRE REF: 57.5 %
ERVN2 PRE: 0.71 L
ERVN2 REF: 1.24
FEF 25 75 CHG: -16.4 %
FEF 25 75 LLN: 1.58
FEF 25 75 POST REF: 31.4 %
FEF 25 75 PRE REF: 37.5 %
FEF 25 75 REF: 3.16
FET100 CHG: 27.3 %
FEV1 CHG: 4.6 %
FEV1 FVC CHG: -5.8 %
FEV1 FVC LLN: 66
FEV1 FVC POST REF: 69.5 %
FEV1 FVC PRE REF: 73.7 %
FEV1 FVC REF: 78
FEV1 LLN: 2.85
FEV1 POST REF: 69.2 %
FEV1 PRE REF: 66.2 %
FEV1 REF: 3.75
FRCN2 LLN: 2.66
FRCN2 PRE REF: 103.7 %
FRCN2 REF: 3.64
FVC CHG: 10.9 %
FVC LLN: 3.72
FVC POST REF: 99.3 %
FVC PRE REF: 89.5 %
FVC REF: 4.85
IVC PRE: 3.66 L
IVC SINGLE BREATH LLN: 3.72
IVC SINGLE BREATH PRE REF: 75.6 %
IVC SINGLE BREATH REF: 4.85
MVV LLN: 121
MVV PRE REF: 44.1 %
MVV REF: 143
PEF CHG: 7.7 %
PEF LLN: 7.19
PEF POST REF: 46 %
PEF PRE REF: 42.7 %
PEF REF: 9.56
POST FEF 25 75: 0.99 L/S
POST FET 100: 12.73 SEC
POST FEV1 FVC: 53.92 %
POST FEV1: 2.6 L
POST FVC: 4.81 L
POST PEF: 4.4 L/S
PRE DLCO: 19.39 ML/(MIN*MMHG)
PRE FEF 25 75: 1.18 L/S
PRE FET 100: 10 SEC
PRE FEV1 FVC: 57.22 %
PRE FEV1: 2.48 L
PRE FRC N2: 3.78 L
PRE FVC: 4.34 L
PRE MVV: 63 L/MIN
PRE PEF: 4.08 L/S
RVN2 LLN: 1.73
RVN2 PRE REF: 119.6 %
RVN2 PRE: 2.88 L
RVN2 REF: 2.4
RVN2TLCN2 LLN: 27.6
RVN2TLCN2 PRE REF: 109 %
RVN2TLCN2 PRE: 39.87 %
RVN2TLCN2 REF: 36.58
TLCN2 LLN: 6.15
TLCN2 PRE REF: 98.8 %
TLCN2 PRE: 7.21 L
TLCN2 REF: 7.3
VA PRE: 5.96 L
VA SINGLE BREATH LLN: 7.15
VA SINGLE BREATH PRE REF: 83.3 %
VA SINGLE BREATH REF: 7.15
VCMAXN2 LLN: 3.72
VCMAXN2 PRE REF: 89.5 %
VCMAXN2 PRE: 4.34 L
VCMAXN2 REF: 4.85

## 2022-04-29 ENCOUNTER — SPECIALTY PHARMACY (OUTPATIENT)
Dept: PHARMACY | Facility: CLINIC | Age: 57
End: 2022-04-29
Payer: COMMERCIAL

## 2022-04-29 NOTE — TELEPHONE ENCOUNTER
"BI-Epclusa  [employer] Rep [employer]   [facility] OON- Must use [facility]    No deductible-CAP or OOPmax but patient is responsible for 100% of med cost brand or generic.  Brand- $26,683.74 (28 days)   Generic-$8,758.48 (28 days)    Copay card eligible but per Rep Philippe T & website-"Eligible  $5 per prescription BUT up to MAX 25% of catalog price of 12 weeks.     Patient consent to FA- Copay card in [facility]  BRAND-$6,234.98/28 days  Generic-$2,001.60/28 days    Pending Mandeep enrollment-Patient will callback with income info.  "

## 2022-04-29 NOTE — TELEPHONE ENCOUNTER
Patient approved for u[employer]amp jarvis $30,000 to apply for generic Epclusa copay assistance with [facility]. Updated patient pending request for AG Epclusa RX.

## 2022-04-29 NOTE — TELEPHONE ENCOUNTER
New Rx received for Epclusa x 12 weeks. No PA required, however, appears insurance coverage is $0. AG Epclusa test claim: $8,758.48 copay.     Test claim also states FUTURE FILLS RESTRICTED AT THIS LOCATION    Per [facility] Website no deductible, CAP, or out of pocket limits. Forwarding to BI/FA.

## 2022-05-03 DIAGNOSIS — B18.2 CHRONIC HEPATITIS C WITHOUT HEPATIC COMA: Primary | ICD-10-CM

## 2022-05-03 RX ORDER — VELPATASVIR AND SOFOSBUVIR 100; 400 MG/1; MG/1
1 TABLET, FILM COATED ORAL DAILY
Qty: 28 TABLET | Refills: 2 | Status: SHIPPED | OUTPATIENT
Start: 2022-05-03 | End: 2022-09-07

## 2022-05-03 NOTE — TELEPHONE ENCOUNTER
Generic Epclusa Rx received. Prescription forwarded to [facility] ([phone]) with attached FA information. Reached patient and discussed this information with him. Pt is aware to contact [facility] if he has any trouble obtaining his medication or if he is told he has a high copay.

## 2022-05-12 ENCOUNTER — PATIENT MESSAGE (OUTPATIENT)
Dept: SMOKING CESSATION | Facility: CLINIC | Age: 57
End: 2022-05-12
Payer: COMMERCIAL

## 2022-05-13 ENCOUNTER — TELEPHONE (OUTPATIENT)
Dept: HEPATOLOGY | Facility: CLINIC | Age: 57
End: 2022-05-13
Payer: COMMERCIAL

## 2022-05-13 NOTE — TELEPHONE ENCOUNTER
[employer] sent a fax stating that patient's Epclusa was going to be shipped to [facility].  I spoke Mirela at [employer] and stressed that shipment most go to patient.  I spoke with patient.  He states that he will call [employer] and have med shipped to his sister's shop since he's not at home during the day.

## 2022-05-30 ENCOUNTER — TELEPHONE (OUTPATIENT)
Dept: HEPATOLOGY | Facility: CLINIC | Age: 57
End: 2022-05-30
Payer: COMMERCIAL

## 2022-05-30 DIAGNOSIS — B18.2 CHRONIC HEPATITIS C WITHOUT HEPATIC COMA: Primary | ICD-10-CM

## 2022-05-30 NOTE — TELEPHONE ENCOUNTER
Pt beginning 12 weeks of Epclusa on 5/29/22  Anticipated treatment end date: 8/20/22  Steffanie 2b  Treatment naive  Advanced fibrosis    Pls update episode and schedule:  - LFT, HCV RNA, AFP at week 4 -   - LFT, HCV RNA - SVR12 - 11/14/22

## 2022-05-30 NOTE — TELEPHONE ENCOUNTER
----- Message from Easton Vieyra sent at 5/30/2022 10:12 AM CDT -----  Regarding: call back  Pt call to speak with Mrs Saucedo in regards to starting his treatment on 05/29    Call

## 2022-05-31 NOTE — TELEPHONE ENCOUNTER
Msg from provider mailed to patient.  Labs scheduled 6/27/22 and 11/14/22; appt reminder notices mailed.

## 2022-06-08 DIAGNOSIS — I25.10 CORONARY ARTERY DISEASE INVOLVING NATIVE HEART: ICD-10-CM

## 2022-06-08 DIAGNOSIS — I25.10 CORONARY ARTERY DISEASE INVOLVING NATIVE CORONARY ARTERY OF NATIVE HEART WITHOUT ANGINA PECTORIS: ICD-10-CM

## 2022-06-08 RX ORDER — METOPROLOL SUCCINATE 25 MG/1
25 TABLET, EXTENDED RELEASE ORAL DAILY
Qty: 90 TABLET | Refills: 3 | Status: SHIPPED | OUTPATIENT
Start: 2022-06-08 | End: 2023-06-20

## 2022-06-08 RX ORDER — ISOSORBIDE MONONITRATE 30 MG/1
30 TABLET, EXTENDED RELEASE ORAL DAILY
Qty: 90 TABLET | Refills: 3 | Status: SHIPPED | OUTPATIENT
Start: 2022-06-08 | End: 2022-11-07 | Stop reason: SDUPTHER

## 2022-06-08 RX ORDER — ROSUVASTATIN CALCIUM 40 MG/1
40 TABLET, COATED ORAL NIGHTLY
Qty: 90 TABLET | Refills: 3 | Status: CANCELLED | OUTPATIENT
Start: 2022-06-08 | End: 2023-06-08

## 2022-06-08 RX ORDER — ASPIRIN 81 MG/1
81 TABLET ORAL DAILY
Qty: 90 TABLET | Refills: 3 | Status: SHIPPED | OUTPATIENT
Start: 2022-06-08 | End: 2023-12-08 | Stop reason: SDUPTHER

## 2022-06-08 RX ORDER — CLOPIDOGREL BISULFATE 75 MG/1
75 TABLET ORAL DAILY
Qty: 90 TABLET | Refills: 3 | Status: SHIPPED | OUTPATIENT
Start: 2022-06-08 | End: 2023-06-20

## 2022-06-13 ENCOUNTER — TELEPHONE (OUTPATIENT)
Dept: HEPATOLOGY | Facility: CLINIC | Age: 57
End: 2022-06-13
Payer: COMMERCIAL

## 2022-06-13 NOTE — TELEPHONE ENCOUNTER
----- Message from Emeka Chery MA sent at 6/13/2022 10:17 AM CDT -----  Regarding: FW: call back    ----- Message -----  From: Verónica Medina MA  Sent: 6/10/2022   4:56 PM CDT  To: Emeka Chery MA  Subject: FW: call back                                      ----- Message -----  From: Easton Vieyra  Sent: 6/10/2022   4:00 PM CDT  To: [facility] Staff  Subject: call back                                        Pt call to speak with Sheryl    Call

## 2022-06-13 NOTE — TELEPHONE ENCOUNTER
I spoke with patient.  He states that he received a call from [facility] on Friday.  He was told that they will no longer provide Epclusa because his insurance company did not cover any of the cost for med.  Patient has only received his 1st shipment.  Please advise.     LLE anterior shin, with surrounding cellulitis

## 2022-06-15 ENCOUNTER — TELEPHONE (OUTPATIENT)
Dept: HEPATOLOGY | Facility: CLINIC | Age: 57
End: 2022-06-15
Payer: COMMERCIAL

## 2022-06-16 NOTE — TELEPHONE ENCOUNTER
----- Message from Tosha Vyas RN sent at 6/7/2022  3:47 PM CDT -----  Patient fell from ladder today.  He states that he hit head but did not loose consciousness.  Patient encouraged to seek medical care since his head hit the ground.  I stressed that it would be okay to take pain med prescribed by MD with hep c med.

## 2022-06-27 ENCOUNTER — LAB VISIT (OUTPATIENT)
Dept: LAB | Facility: HOSPITAL | Age: 57
End: 2022-06-27
Attending: FAMILY MEDICINE
Payer: COMMERCIAL

## 2022-06-27 DIAGNOSIS — B18.2 CHRONIC HEPATITIS C WITHOUT HEPATIC COMA: ICD-10-CM

## 2022-06-27 LAB
AFP SERPL-MCNC: 7 NG/ML (ref 0–8.4)
ALBUMIN SERPL BCP-MCNC: 3.7 G/DL (ref 3.5–5.2)
ALP SERPL-CCNC: 73 U/L (ref 55–135)
ALT SERPL W/O P-5'-P-CCNC: 52 U/L (ref 10–44)
AST SERPL-CCNC: 38 U/L (ref 10–40)
BILIRUB DIRECT SERPL-MCNC: 0.3 MG/DL (ref 0.1–0.3)
BILIRUB SERPL-MCNC: 0.6 MG/DL (ref 0.1–1)
PROT SERPL-MCNC: 7.3 G/DL (ref 6–8.4)

## 2022-06-27 PROCEDURE — 87522 HEPATITIS C REVRS TRNSCRPJ: CPT | Performed by: PHYSICIAN ASSISTANT

## 2022-06-27 PROCEDURE — 80076 HEPATIC FUNCTION PANEL: CPT | Performed by: PHYSICIAN ASSISTANT

## 2022-06-27 PROCEDURE — 82105 ALPHA-FETOPROTEIN SERUM: CPT | Performed by: PHYSICIAN ASSISTANT

## 2022-06-27 PROCEDURE — 36415 COLL VENOUS BLD VENIPUNCTURE: CPT | Mod: PO | Performed by: PHYSICIAN ASSISTANT

## 2022-06-30 ENCOUNTER — TELEPHONE (OUTPATIENT)
Dept: HEPATOLOGY | Facility: CLINIC | Age: 57
End: 2022-06-30
Payer: COMMERCIAL

## 2022-06-30 LAB — HCV RNA SERPL NAA+PROBE-ACNC: NORMAL IU/ML

## 2022-06-30 NOTE — TELEPHONE ENCOUNTER
Pt began 12 weeks of Epclusa on 5/29/22  Anticipated treatment end date: 8/20/22  Steffanie 2b  Treatment naive  Advanced fibrosis    6/27/22  AFP 7  HCV neg  LFT - much improved     Please tell pt:  Labs look good   Liver numbers look MUCH BETTER!!   Hep C virus is no longer showing up in blood, but do not stop treatment yet!   It is very important to continue the full 12 weeks of Epclusa. Try not to miss any doses. Treatment should end in 8/2022   Liver cancer screening lab is normal   Upcoming MRI for kidneys will allow me to visualize liver. Assuming this looks okay, next liver imaging would be due 1/2022. I'll keep an eye out for these results.     There is a small chance the virus can return during the 3 months after treatment ends. We will monitor blood for this.     Next labs to see if Hep C is cured are due: LFT, HCV RNA - SVR12 - 11/14/22

## 2022-07-01 ENCOUNTER — HOSPITAL ENCOUNTER (OUTPATIENT)
Dept: RADIOLOGY | Facility: HOSPITAL | Age: 57
Discharge: HOME | End: 2022-07-01
Attending: STUDENT IN AN ORGANIZED HEALTH CARE EDUCATION/TRAINING PROGRAM
Payer: COMMERCIAL

## 2022-07-01 DIAGNOSIS — N28.1 CYST OF KIDNEY, ACQUIRED: ICD-10-CM

## 2022-07-01 PROCEDURE — A9585 GADOBUTROL INJECTION: HCPCS | Mod: PO | Performed by: STUDENT IN AN ORGANIZED HEALTH CARE EDUCATION/TRAINING PROGRAM

## 2022-07-01 PROCEDURE — 74183 MRI ABD W/O CNTR FLWD CNTR: CPT | Mod: TC,PO

## 2022-07-01 PROCEDURE — 25500020 PHARM REV CODE 255: Mod: PO | Performed by: STUDENT IN AN ORGANIZED HEALTH CARE EDUCATION/TRAINING PROGRAM

## 2022-07-01 RX ORDER — GADOBUTROL 604.72 MG/ML
7 INJECTION INTRAVENOUS
Status: COMPLETED | OUTPATIENT
Start: 2022-07-01 | End: 2022-07-01

## 2022-07-01 RX ADMIN — GADOBUTROL 7 ML: 604.72 INJECTION INTRAVENOUS at 04:07

## 2022-07-11 ENCOUNTER — HOSPITAL ENCOUNTER (EMERGENCY)
Facility: HOSPITAL | Age: 57
Discharge: HOME | End: 2022-07-11
Attending: EMERGENCY MEDICINE
Payer: COMMERCIAL

## 2022-07-11 VITALS
BODY MASS INDEX: 22.4 KG/M2 | HEIGHT: 71 IN | HEART RATE: 66 BPM | DIASTOLIC BLOOD PRESSURE: 78 MMHG | RESPIRATION RATE: 16 BRPM | OXYGEN SATURATION: 97 % | WEIGHT: 160 LBS | TEMPERATURE: 98 F | SYSTOLIC BLOOD PRESSURE: 115 MMHG

## 2022-07-11 DIAGNOSIS — M25.529 ELBOW PAIN: ICD-10-CM

## 2022-07-11 DIAGNOSIS — M70.21 OLECRANON BURSITIS OF RIGHT ELBOW: Primary | ICD-10-CM

## 2022-07-11 PROCEDURE — 99284 EMERGENCY DEPT VISIT MOD MDM: CPT

## 2022-07-11 RX ORDER — CEPHALEXIN 500 MG/1
500 CAPSULE ORAL 4 TIMES DAILY
Qty: 28 CAPSULE | Refills: 0 | Status: SHIPPED | OUTPATIENT
Start: 2022-07-11 | End: 2022-07-18

## 2022-07-11 RX ORDER — SULFAMETHOXAZOLE AND TRIMETHOPRIM 800; 160 MG/1; MG/1
1 TABLET ORAL 2 TIMES DAILY
Qty: 14 TABLET | Refills: 0 | Status: SHIPPED | OUTPATIENT
Start: 2022-07-11 | End: 2022-07-18

## 2022-07-11 NOTE — DISCHARGE INSTRUCTIONS
Keep compression dressing on the elbow to help with swelling.  Take antibiotics as prescribed.  Follow up with orthopedics.  For worsening symptoms, chest pain, shortness of breath, increased abdominal pain, high grade fever, stroke or stroke like symptoms, immediately go to the nearest Emergency Room or call 911 as soon as possible.

## 2022-07-11 NOTE — ED PROVIDER NOTES
Encounter Date: 7/11/2022    SCRIBE #1 NOTE: IYomaira, jared scribing for, and in the presence of, Brandy Chou PA-C.       History     Chief Complaint   Patient presents with    Arm Injury     Rt. Arm fall 2 weeks ago /rt. Elbow swelling     Time seen by provider: 9:20 AM on 07/11/2022    Anatoly Hatch III is a 56 y.o. male who presents to the ED with an onset of right elbow swelling for 1.5 weeks. Patient reports he fell on his back and elbow approximately 3 weeks ago and had a wound to the right elbow. He states his elbow started swelling and he was having yellow fluid draining from the wound. He reports the elbow started to get tender last night and has felt warmer today. Patient denies hx of DM. The patient denies fever or any other symptoms at this time. No pertinent PSHx.    The history is provided by the patient.     Review of patient's allergies indicates:  No Known Allergies  Past Medical History:   Diagnosis Date    Abdominal abscess 09/21/2017    Appendicitis 09/21/2017    Hepatitis 2005    NSTEMI (non-ST elevated myocardial infarction) 8/4/2021     Past Surgical History:   Procedure Laterality Date    ANGIOGRAM, CORONARY, WITH LEFT HEART CATHETERIZATION N/A 8/5/2021    Procedure: Angiogram, Coronary, with Left Heart Cath;  Surgeon: Erasmo Slaughter MD;  Location: [facility] CATH/EP LAB;  Service: Cardiology;  Laterality: N/A;    APPENDECTOMY  09/21/2017    done at [facility] by Dr. Vasquez- Gama    SMALL INTESTINE SURGERY  02/25/2018    SB Resection, drng abscess - Dr. Mar - [facility]     No family history on file.  Social History     Tobacco Use    Smoking status: Current Every Day Smoker     Packs/day: 1.00     Years: 30.00     Pack years: 30.00     Types: Cigarettes    Smokeless tobacco: Never Used   Substance Use Topics    Alcohol use: Yes     Comment: 4 glasses of whiskey daily    Drug use: No     Review of Systems   Constitutional: Negative for fever.   Respiratory: Negative for  shortness of breath.    Genitourinary: Negative for flank pain.   Musculoskeletal: Positive for joint swelling. Negative for gait problem.   Neurological: Negative for weakness.   Psychiatric/Behavioral: Negative for confusion.       Physical Exam     Initial Vitals [07/11/22 0902]   BP Pulse Resp Temp SpO2   122/76 84 18 98.4 °F (36.9 °C) 98 %      MAP       --         Physical Exam    Nursing note and vitals reviewed.  Constitutional: He appears well-developed and well-nourished. He is not diaphoretic. No distress.   Cardiovascular: Intact distal pulses.   Musculoskeletal:         General: Tenderness and edema present. Normal range of motion.      Right elbow: Effusion present. Normal range of motion.      Comments: Swelling to the right olecranon bursa with no erythema or tenderness. 2+ radial pulse. Sensation intact. Full, passive ROM.      Neurological: He is alert and oriented to person, place, and time. He has normal strength. No sensory deficit.   Skin: Skin is warm and dry. No rash and no abscess noted. No erythema.   Psychiatric: He has a normal mood and affect.         ED Course   Procedures  Labs Reviewed - No data to display       Imaging Results          X-Ray Elbow Complete Right (Final result)  Result time 07/11/22 09:53:18    Final result by Marino Narvaez MD (07/11/22 09:53:18)                 Impression:      Olecranon bursitis differential rheumatoid trauma infection and gout.      Electronically signed by: Marino Narvaez  Date:    07/11/2022  Time:    09:53             Narrative:    EXAMINATION:  XR ELBOW COMPLETE 3 VIEW RIGHT    CLINICAL HISTORY:  . Pain in unspecified elbow    TECHNIQUE:  AP, lateral, and oblique views of the right elbow were performed.    COMPARISON:  None    FINDINGS:  No acute fracture.  Small enthesophyte along the olecranon tip.  No malalignment.  Minimal degenerative change ulnar humeral joint and preserved radiocapitellar joint space.  No effusion at the elbow.   Soft tissue swelling directly overlies the olecranon tip.                                 Medications - No data to display  Medical Decision Making:   History:   Old Medical Records: I decided to obtain old medical records.  Clinical Tests:   Radiological Study: Ordered and Reviewed       APC / Resident Notes:   Urgent evaluation of a 56-year-old male who presents with right elbow pain and swelling.  He reports symptoms started after a fall.  He is neurovascularly intact.  He has full, passive range of motion of the elbow.  He has some mild swelling to the olecranon bursa.  No surrounding erythema or warmth.  X-ray shows swelling to the bursa with no acute fracture.  Will treat with compression dressing and cover with antibiotic due to trauma.  Orthopedic follow-up if symptoms persist. Discussed results with patient. Return precautions given. Based on my clinical evaluation, I do not appreciate any immediate, emergent, or life threatening condition or etiology that warrants additional workup today and feel that the patient can be discharged with close follow up care.  Patient is to follow up with their primary care provider. All questions answered.        Scribe Attestation:   Scribe #1: I performed the above scribed service and the documentation accurately describes the services I performed. I attest to the accuracy of the note.               I, Brandy Chou PA-C, personally performed the services described in this documentation. All medical record entries made by the scribe were at my direction and in my presence.  I have reviewed the chart and agree that the record reflects my personal performance and is accurate and complete. Brandy Chou PA-C.  3:55 PM 07/11/2022    Clinical Impression:   Final diagnoses:  [M25.529] Elbow pain  [M70.21] Olecranon bursitis of right elbow (Primary)          ED Disposition Condition    Discharge Stable        ED Prescriptions     Medication Sig Dispense Start Date End  Date Auth. Provider    sulfamethoxazole-trimethoprim 800-160mg (BACTRIM DS) 800-160 mg Tab Take 1 tablet by mouth 2 (two) times daily. for 7 days 14 tablet 7/11/2022 7/18/2022 Brandy Chou PA-C    cephALEXin (KEFLEX) 500 MG capsule Take 1 capsule (500 mg total) by mouth 4 (four) times daily. for 7 days 28 capsule 7/11/2022 7/18/2022 Brandy Chou PA-C        Follow-up Information     Follow up With Specialties Details Why Contact Info    Kathie Aranda MD Family Medicine   [address] [facility] [address]  [phone]      Mika Jiménez MD Orthopedic Surgery   [address] [place] [address]  [address]  [facility]  [facility] [id number]  [phone]      [facility] Emergency Dept Emergency Medicine  As needed [address]  [facility] [address] [phone]           Brandy Chou PA-C  07/11/22 1350

## 2022-07-11 NOTE — PROGRESS NOTES
[facility] Note    PCP: Kathie Aranda MD (Inactive)    Chief Complaint: renal mass    SUBJECTIVE:       History of Present Illness:  Anatoly Hatch III is a 56 y.o. male who presents to clinic for renal mass. He is Established  to our clinic.     MRI shows that complex cyst of the left kidney has increased in size from 4.7 to 5.2 cm. This contains thin internal enhancing septations.   Previously in 2018 had measured 2.8 cm.     He is without new complaints today.     12/1/21  Patient seen by Yanely for left renal mass in 2017. Had presented to [facility] with abdominal pain and had suffered ruptured appendicitis. He underwent emergent appendectomy and washout of intra-abdominal abscess. At that time was noted to have a largely cystic complex renal 2.6 cm left mass.    He was seen by Dr. Estrada and 6 months later he underwent a MRI which showed a 2.8 cm which showed a Bosniak II cyst. He was then lost to follow up.    He recently had an US which showed a 4.3 cm complex cystic lesion of the left kidney.  UA 4/5/21: negative for blood, leuks, nitrite     No issues with urination.   No hematuria or dysuria.     Patient diagnosed with active Hep C.   Currently everyday 1 pack/day smoker.     Last urine culture: no documented UTIs    Lab Results   Component Value Date    CREATININE 0.7 01/11/2022     Family  hx: no malignancy   Hx of CAD, Hep C    Past medical, family, and social history reviewed as documented in chart with pertinent positive medical, family, and social history detailed in HPI.    Review of patient's allergies indicates:  No Known Allergies    Past Medical History:   Diagnosis Date    Abdominal abscess 09/21/2017    Appendicitis 09/21/2017    Hepatitis 2005    NSTEMI (non-ST elevated myocardial infarction) 8/4/2021     Past Surgical History:   Procedure Laterality Date    ANGIOGRAM, CORONARY, WITH LEFT HEART CATHETERIZATION N/A 8/5/2021    Procedure: Angiogram, Coronary,  "with Left Heart Cath;  Surgeon: Erasmo Slaughter MD;  Location: [facility] CATH/EP LAB;  Service: Cardiology;  Laterality: N/A;    APPENDECTOMY  09/21/2017    done at [facility] by Dr. Vasquez- Gama    SMALL INTESTINE SURGERY  02/25/2018    SB Resection, drng abscess - Dr. Mar - [facility]     No family history on file.  Social History     Tobacco Use    Smoking status: Current Every Day Smoker     Packs/day: 1.00     Years: 30.00     Pack years: 30.00     Types: Cigarettes    Smokeless tobacco: Never Used   Substance Use Topics    Alcohol use: Yes     Comment: 4 glasses of whiskey daily    Drug use: No        Review of Systems   Genitourinary: Negative for difficulty urinating, dysuria, frequency, hematuria, nocturia, testicular pain and urgency.       OBJECTIVE:     Anticoagulation:  Aspirin 81 mg, plavix 75 mg    Estimated body mass index is 22.32 kg/m² as calculated from the following:    Height as of this encounter: 5' 11" (1.803 m).    Weight as of this encounter: 72.6 kg (160 lb 0.9 oz).    Vital Signs (Most Recent)  Pulse: 80 (07/12/22 1141)  Resp: 18 (07/12/22 1141)  BP: 117/76 (07/12/22 1141)    Physical Exam  Constitutional:       General: He is not in acute distress.     Appearance: Normal appearance. He is not ill-appearing.   HENT:      Head: Normocephalic and atraumatic.   Eyes:      General: No scleral icterus.  Cardiovascular:      Rate and Rhythm: Normal rate and regular rhythm.   Pulmonary:      Effort: Pulmonary effort is normal. No respiratory distress.   Abdominal:      General: There is no distension.      Palpations: Abdomen is soft.      Comments: Midline abdominal scar from pubic bone to above umbilicus.    Skin:     Coloration: Skin is not jaundiced.   Neurological:      General: No focal deficit present.      Mental Status: He is alert and oriented to person, place, and time.   Psychiatric:         Mood and Affect: Mood normal.         Behavior: Behavior normal.         Thought Content: Thought " content normal.         BMP  Lab Results   Component Value Date     01/11/2022    K 4.1 01/11/2022     01/11/2022    CO2 27 01/11/2022    BUN 17 01/11/2022    CREATININE 0.7 01/11/2022    CALCIUM 9.9 01/11/2022    ANIONGAP 7 (L) 01/11/2022    ESTGFRAFRICA >60.0 01/11/2022    EGFRNONAA >60.0 01/11/2022       Lab Results   Component Value Date    WBC 8.36 12/08/2021    HGB 15.2 12/08/2021    HCT 44.9 12/08/2021    MCV 96 12/08/2021     12/08/2021       Imaging:  Per HPI    ASSESSMENT     1. Complex renal cyst    2. Other specified disorders of kidney and ureter    3. Chronic alcohol use    4. Coronary artery disease involving native heart, unspecified vessel or lesion type, unspecified whether angina present    5. Chronic hepatitis C without hepatic coma    6. Tobacco use disorder, severe, dependence        PLAN:     - We reviewed his MRI in detail. Recent scan shows concern for possible low grade malignancy. It does appear that there may be some solid components on the endophytic portion of the cyst. In addition to this, the cyst has been growing at approximately 5 mm a year.   - Given these findings I recommended we consider a radical nephrectomy as this would be the best way to diagnose and treat possible a malignancy.   - We also discussed a biopsy along with the risks/benefits of this including being non-diagnostic.   - Given relatively non-aggressive findings as well as slow growth pattern I offered repeat MRI in 6 months as he is currently not ready to commit to a nephrectomy  -  Will have him follow up with results. If he changes his mind in the interim will let me know.     Ade Liriano MD     I spent 30 minutes with the patient. Over 50% of the visit was spent in counseling.

## 2022-07-12 ENCOUNTER — OFFICE VISIT (OUTPATIENT)
Dept: UROLOGY | Facility: CLINIC | Age: 57
End: 2022-07-12
Payer: COMMERCIAL

## 2022-07-12 VITALS
SYSTOLIC BLOOD PRESSURE: 117 MMHG | HEART RATE: 80 BPM | WEIGHT: 160.06 LBS | HEIGHT: 71 IN | BODY MASS INDEX: 22.41 KG/M2 | RESPIRATION RATE: 18 BRPM | DIASTOLIC BLOOD PRESSURE: 76 MMHG

## 2022-07-12 DIAGNOSIS — F10.90 CHRONIC ALCOHOL USE: ICD-10-CM

## 2022-07-12 DIAGNOSIS — N28.89 OTHER SPECIFIED DISORDERS OF KIDNEY AND URETER: ICD-10-CM

## 2022-07-12 DIAGNOSIS — F17.200 TOBACCO USE DISORDER, SEVERE, DEPENDENCE: ICD-10-CM

## 2022-07-12 DIAGNOSIS — I25.10 CORONARY ARTERY DISEASE INVOLVING NATIVE HEART, UNSPECIFIED VESSEL OR LESION TYPE, UNSPECIFIED WHETHER ANGINA PRESENT: ICD-10-CM

## 2022-07-12 DIAGNOSIS — N28.1 COMPLEX RENAL CYST: Primary | ICD-10-CM

## 2022-07-12 DIAGNOSIS — B18.2 CHRONIC HEPATITIS C WITHOUT HEPATIC COMA: ICD-10-CM

## 2022-07-12 PROCEDURE — 99214 PR OFFICE/OUTPT VISIT, EST, LEVL IV, 30-39 MIN: ICD-10-PCS | Mod: S$GLB,,, | Performed by: STUDENT IN AN ORGANIZED HEALTH CARE EDUCATION/TRAINING PROGRAM

## 2022-07-12 PROCEDURE — 99999 PR PBB SHADOW E&M-EST. PATIENT-LVL III: ICD-10-PCS | Mod: PBBFAC,,, | Performed by: STUDENT IN AN ORGANIZED HEALTH CARE EDUCATION/TRAINING PROGRAM

## 2022-07-12 PROCEDURE — 99999 PR PBB SHADOW E&M-EST. PATIENT-LVL III: CPT | Mod: PBBFAC,,, | Performed by: STUDENT IN AN ORGANIZED HEALTH CARE EDUCATION/TRAINING PROGRAM

## 2022-07-12 PROCEDURE — 3074F PR MOST RECENT SYSTOLIC BLOOD PRESSURE < 130 MM HG: ICD-10-PCS | Mod: CPTII,S$GLB,, | Performed by: STUDENT IN AN ORGANIZED HEALTH CARE EDUCATION/TRAINING PROGRAM

## 2022-07-12 PROCEDURE — 3078F DIAST BP <80 MM HG: CPT | Mod: CPTII,S$GLB,, | Performed by: STUDENT IN AN ORGANIZED HEALTH CARE EDUCATION/TRAINING PROGRAM

## 2022-07-12 PROCEDURE — 3074F SYST BP LT 130 MM HG: CPT | Mod: CPTII,S$GLB,, | Performed by: STUDENT IN AN ORGANIZED HEALTH CARE EDUCATION/TRAINING PROGRAM

## 2022-07-12 PROCEDURE — 1159F MED LIST DOCD IN RCRD: CPT | Mod: CPTII,S$GLB,, | Performed by: STUDENT IN AN ORGANIZED HEALTH CARE EDUCATION/TRAINING PROGRAM

## 2022-07-12 PROCEDURE — 3008F BODY MASS INDEX DOCD: CPT | Mod: CPTII,S$GLB,, | Performed by: STUDENT IN AN ORGANIZED HEALTH CARE EDUCATION/TRAINING PROGRAM

## 2022-07-12 PROCEDURE — 3008F PR BODY MASS INDEX (BMI) DOCUMENTED: ICD-10-PCS | Mod: CPTII,S$GLB,, | Performed by: STUDENT IN AN ORGANIZED HEALTH CARE EDUCATION/TRAINING PROGRAM

## 2022-07-12 PROCEDURE — 99214 OFFICE O/P EST MOD 30 MIN: CPT | Mod: S$GLB,,, | Performed by: STUDENT IN AN ORGANIZED HEALTH CARE EDUCATION/TRAINING PROGRAM

## 2022-07-12 PROCEDURE — 3078F PR MOST RECENT DIASTOLIC BLOOD PRESSURE < 80 MM HG: ICD-10-PCS | Mod: CPTII,S$GLB,, | Performed by: STUDENT IN AN ORGANIZED HEALTH CARE EDUCATION/TRAINING PROGRAM

## 2022-07-12 PROCEDURE — 1159F PR MEDICATION LIST DOCUMENTED IN MEDICAL RECORD: ICD-10-PCS | Mod: CPTII,S$GLB,, | Performed by: STUDENT IN AN ORGANIZED HEALTH CARE EDUCATION/TRAINING PROGRAM

## 2022-07-21 ENCOUNTER — TELEPHONE (OUTPATIENT)
Dept: HEPATOLOGY | Facility: CLINIC | Age: 57
End: 2022-07-21
Payer: COMMERCIAL

## 2022-07-21 NOTE — TELEPHONE ENCOUNTER
----- Message from Easton Vieyra sent at 7/20/2022  4:50 PM CDT -----  Regarding: call back  Pt call to speak with Sheryl in regards to rx     Call

## 2022-07-21 NOTE — TELEPHONE ENCOUNTER
I spoke with patient who states that he will take last available Epclusa tab on Saturday.  He states that he spoke with [facility] on late Wednesday and was told he should have gotten Epclusa on Wednesday.  I told him to call me 1st thing Friday morning if he doesn't get package by the end of today.

## 2022-08-19 ENCOUNTER — CLINICAL SUPPORT (OUTPATIENT)
Dept: INTERNAL MEDICINE | Facility: CLINIC | Age: 57
End: 2022-08-19
Payer: COMMERCIAL

## 2022-08-19 VITALS — SYSTOLIC BLOOD PRESSURE: 116 MMHG | DIASTOLIC BLOOD PRESSURE: 76 MMHG

## 2022-08-19 DIAGNOSIS — Z23 NEED FOR HEPATITIS VACCINATION: ICD-10-CM

## 2022-08-19 PROCEDURE — 90471 IMMUNIZATION ADMIN: CPT | Mod: S$GLB,,, | Performed by: PHYSICIAN ASSISTANT

## 2022-08-19 PROCEDURE — 90636 HEP A/HEP B VACC ADULT IM: CPT | Mod: S$GLB,,, | Performed by: PHYSICIAN ASSISTANT

## 2022-08-19 PROCEDURE — 99999 PR PBB SHADOW E&M-EST. PATIENT-LVL I: CPT | Mod: PBBFAC,,,

## 2022-08-19 PROCEDURE — 90471 HEPATITIS A HEPATITIS B COMBINED VACCINE IM: ICD-10-PCS | Mod: S$GLB,,, | Performed by: PHYSICIAN ASSISTANT

## 2022-08-19 PROCEDURE — 99999 PR PBB SHADOW E&M-EST. PATIENT-LVL I: ICD-10-PCS | Mod: PBBFAC,,,

## 2022-08-19 PROCEDURE — 90636 HEPATITIS A HEPATITIS B COMBINED VACCINE IM: ICD-10-PCS | Mod: S$GLB,,, | Performed by: PHYSICIAN ASSISTANT

## 2022-08-19 NOTE — PROGRESS NOTES
Aseptic technique used.  Administration #3  Twinrix hep A ( 7/28/20)  Hep B ( 8/15/19)  vaccine to the  L Deltoid IM given.  Tolerated well.  waited 15 min.  VIS given/mp

## 2022-08-29 ENCOUNTER — TELEPHONE (OUTPATIENT)
Dept: HEPATOLOGY | Facility: CLINIC | Age: 57
End: 2022-08-29
Payer: COMMERCIAL

## 2022-08-29 DIAGNOSIS — K74.02 ADVANCED HEPATIC FIBROSIS: Primary | ICD-10-CM

## 2022-08-29 NOTE — TELEPHONE ENCOUNTER
----- Message from Jennifer B. Scheuermann, PA sent at 6/30/2022  3:41 PM CDT -----  Regarding: mri 7/2022 - urology

## 2022-08-29 NOTE — TELEPHONE ENCOUNTER
Chart review  MRI abd 7/1/22 (ordered by urology to eval kidney lesion) - no liver masses  AFP 6/27/22 normal    Repeat MRI scheduled - 1/9/23 (by urology)    Pls call pt -  Imaging ordered by urology to monitor kidney cyst will suffice for liver monitoring  Keep appt for MRI 1/2023  Schedule in 1/2023: CBC, CMP, INR, AFP.   Schedule visit w/ me in Jan or Feb. (Ideally this would be after his MRI)

## 2022-08-29 NOTE — TELEPHONE ENCOUNTER
Attempt made to reach patient.  Msg from PA Scheuermann left on his VM and mailed to him.  Testing scheduled 1/9/23 and f/u visit scheduled 1/17/23; appt reminder notices mailed.

## 2022-09-07 ENCOUNTER — OFFICE VISIT (OUTPATIENT)
Dept: FAMILY MEDICINE | Facility: CLINIC | Age: 57
End: 2022-09-07
Payer: COMMERCIAL

## 2022-09-07 VITALS
HEIGHT: 71 IN | SYSTOLIC BLOOD PRESSURE: 122 MMHG | DIASTOLIC BLOOD PRESSURE: 72 MMHG | WEIGHT: 165.38 LBS | RESPIRATION RATE: 15 BRPM | OXYGEN SATURATION: 95 % | BODY MASS INDEX: 23.15 KG/M2 | HEART RATE: 76 BPM

## 2022-09-07 DIAGNOSIS — F10.90 CHRONIC ALCOHOL USE: ICD-10-CM

## 2022-09-07 DIAGNOSIS — F17.200 TOBACCO USE DISORDER, SEVERE, DEPENDENCE: ICD-10-CM

## 2022-09-07 DIAGNOSIS — Z00.00 ROUTINE GENERAL MEDICAL EXAMINATION AT A HEALTH CARE FACILITY: Primary | ICD-10-CM

## 2022-09-07 DIAGNOSIS — B18.2 CHRONIC HEPATITIS C WITHOUT HEPATIC COMA: ICD-10-CM

## 2022-09-07 DIAGNOSIS — Z12.11 ENCOUNTER FOR SCREENING FOR MALIGNANT NEOPLASM OF COLON: ICD-10-CM

## 2022-09-07 DIAGNOSIS — F17.200 NEEDS SMOKING CESSATION EDUCATION: ICD-10-CM

## 2022-09-07 PROCEDURE — 3074F PR MOST RECENT SYSTOLIC BLOOD PRESSURE < 130 MM HG: ICD-10-PCS | Mod: CPTII,S$GLB,, | Performed by: STUDENT IN AN ORGANIZED HEALTH CARE EDUCATION/TRAINING PROGRAM

## 2022-09-07 PROCEDURE — 99999 PR PBB SHADOW E&M-EST. PATIENT-LVL V: CPT | Mod: PBBFAC,,, | Performed by: STUDENT IN AN ORGANIZED HEALTH CARE EDUCATION/TRAINING PROGRAM

## 2022-09-07 PROCEDURE — 3078F DIAST BP <80 MM HG: CPT | Mod: CPTII,S$GLB,, | Performed by: STUDENT IN AN ORGANIZED HEALTH CARE EDUCATION/TRAINING PROGRAM

## 2022-09-07 PROCEDURE — 1160F RVW MEDS BY RX/DR IN RCRD: CPT | Mod: CPTII,S$GLB,, | Performed by: STUDENT IN AN ORGANIZED HEALTH CARE EDUCATION/TRAINING PROGRAM

## 2022-09-07 PROCEDURE — 1159F MED LIST DOCD IN RCRD: CPT | Mod: CPTII,S$GLB,, | Performed by: STUDENT IN AN ORGANIZED HEALTH CARE EDUCATION/TRAINING PROGRAM

## 2022-09-07 PROCEDURE — 3008F BODY MASS INDEX DOCD: CPT | Mod: CPTII,S$GLB,, | Performed by: STUDENT IN AN ORGANIZED HEALTH CARE EDUCATION/TRAINING PROGRAM

## 2022-09-07 PROCEDURE — 1160F PR REVIEW ALL MEDS BY PRESCRIBER/CLIN PHARMACIST DOCUMENTED: ICD-10-PCS | Mod: CPTII,S$GLB,, | Performed by: STUDENT IN AN ORGANIZED HEALTH CARE EDUCATION/TRAINING PROGRAM

## 2022-09-07 PROCEDURE — 99214 OFFICE O/P EST MOD 30 MIN: CPT | Mod: 25,S$GLB,, | Performed by: STUDENT IN AN ORGANIZED HEALTH CARE EDUCATION/TRAINING PROGRAM

## 2022-09-07 PROCEDURE — 3008F PR BODY MASS INDEX (BMI) DOCUMENTED: ICD-10-PCS | Mod: CPTII,S$GLB,, | Performed by: STUDENT IN AN ORGANIZED HEALTH CARE EDUCATION/TRAINING PROGRAM

## 2022-09-07 PROCEDURE — 3074F SYST BP LT 130 MM HG: CPT | Mod: CPTII,S$GLB,, | Performed by: STUDENT IN AN ORGANIZED HEALTH CARE EDUCATION/TRAINING PROGRAM

## 2022-09-07 PROCEDURE — 99999 PR PBB SHADOW E&M-EST. PATIENT-LVL V: ICD-10-PCS | Mod: PBBFAC,,, | Performed by: STUDENT IN AN ORGANIZED HEALTH CARE EDUCATION/TRAINING PROGRAM

## 2022-09-07 PROCEDURE — 99406 BEHAV CHNG SMOKING 3-10 MIN: CPT | Mod: S$GLB,,, | Performed by: STUDENT IN AN ORGANIZED HEALTH CARE EDUCATION/TRAINING PROGRAM

## 2022-09-07 PROCEDURE — 3078F PR MOST RECENT DIASTOLIC BLOOD PRESSURE < 80 MM HG: ICD-10-PCS | Mod: CPTII,S$GLB,, | Performed by: STUDENT IN AN ORGANIZED HEALTH CARE EDUCATION/TRAINING PROGRAM

## 2022-09-07 PROCEDURE — 1159F PR MEDICATION LIST DOCUMENTED IN MEDICAL RECORD: ICD-10-PCS | Mod: CPTII,S$GLB,, | Performed by: STUDENT IN AN ORGANIZED HEALTH CARE EDUCATION/TRAINING PROGRAM

## 2022-09-07 PROCEDURE — 99214 PR OFFICE/OUTPT VISIT, EST, LEVL IV, 30-39 MIN: ICD-10-PCS | Mod: 25,S$GLB,, | Performed by: STUDENT IN AN ORGANIZED HEALTH CARE EDUCATION/TRAINING PROGRAM

## 2022-09-07 PROCEDURE — 99406 PR TOBACCO USE CESSATION INTERMEDIATE 3-10 MINUTES: ICD-10-PCS | Mod: S$GLB,,, | Performed by: STUDENT IN AN ORGANIZED HEALTH CARE EDUCATION/TRAINING PROGRAM

## 2022-09-09 NOTE — PROGRESS NOTES
"[facility] CLINIC NOTE    Patient Name: Anatoly Hatch III  YOB: 1965    PRESENTING HISTORY     History of Present Illness:  Mr. Anatoly Hatch III is a 56 y.o. male here to establish care.     HCV- 2/2 remote IV drug use. S/p treatment.    Liver inflammation has improved dramatically.     Chronic alcohol use- has gone several months without drinking recently (see transaminases). Back to drinking. Not thinking about quitting right now.     Chronic tobacco use- discussed for >3 minutes. Pre-contemplative. Strategies discussed.         ROS      OBJECTIVE:   Vital Signs:  Vitals:    09/07/22 1506   BP: 122/72   Pulse: 76   Resp: 15   SpO2: 95%   Weight: 75 kg (165 lb 5.5 oz)   Height: 5' 11" (1.803 m)          Physical Exam: Normal    Physical Exam    ASSESSMENT & PLAN:     Routine general medical examination at a health care facility    Encounter for screening for malignant neoplasm of colon  -     Ambulatory referral/consult to Gastroenterology; Future; Expected date: 09/14/2022    Tobacco use disorder, severe, dependence  See discussion above    Chronic hepatitis C without hepatic coma  See discussion above    Chronic alcohol use  See discussion above    Bryan Brandt MD   Internal Medicine              "

## 2022-09-09 NOTE — PATIENT INSTRUCTIONS
Renato Ledbetter,     If you are due for any health screening(s) below please notify me so we can arrange them to be ordered and scheduled to maintain your health. Most healthy patients complete it. Don't lose out on improving your health.     Tests to Keep You Healthy    Colon Cancer Screening: ORDERED  Tobacco Cessation: NO         Colon Cancer Screening    Of cancers affecting both men and women, colorectal cancer is the third leading cancer killer in the [place]. But it doesnt have to be. Screening can prevent colorectal cancer or find it at an early stage when treatment often leads to a cure.    A colonoscopy is the preferred test for detecting colon cancer. It is needed only once every 10 years if results are negative. While sedated, a flexible, lighted tube with a tiny camera is inserted into the rectum and advanced through the colon to look for cancers. An alternative screening test that is used at home and returned to the lab may also be used. It detects hidden blood in bowel movements which could indicate cancer in the colon. If results are positive, you will need a colonoscopy to determine if the blood is a sign of cancer. This type of follow up (diagnostic) colonoscopy usually requires additional copays as required by your insurance provider. Please contact your PCP if you have any questions.    Although you are still overdue for this important screening, due to the COVID-19 pandemic, we recommend scheduling it in the near future.

## 2022-09-12 NOTE — TELEPHONE ENCOUNTER
No new care gaps identified.  [facility] Embedded Care Gaps. Reference number: [id number]. 9/12/2022   2:34:32 PM CDT

## 2022-09-13 RX ORDER — ROSUVASTATIN CALCIUM 10 MG/1
10 TABLET, COATED ORAL DAILY
Qty: 90 TABLET | Refills: 3 | Status: SHIPPED | OUTPATIENT
Start: 2022-09-13 | End: 2023-06-26

## 2022-11-03 ENCOUNTER — PATIENT MESSAGE (OUTPATIENT)
Dept: ADMINISTRATIVE | Facility: HOSPITAL | Age: 57
End: 2022-11-03
Payer: COMMERCIAL

## 2022-11-07 DIAGNOSIS — I25.10 CORONARY ARTERY DISEASE INVOLVING NATIVE CORONARY ARTERY OF NATIVE HEART WITHOUT ANGINA PECTORIS: ICD-10-CM

## 2022-11-07 RX ORDER — ISOSORBIDE MONONITRATE 30 MG/1
30 TABLET, EXTENDED RELEASE ORAL DAILY
Qty: 90 TABLET | Refills: 3 | Status: SHIPPED | OUTPATIENT
Start: 2022-11-07 | End: 2023-08-18

## 2022-11-07 NOTE — TELEPHONE ENCOUNTER
----- Message from Yola Wen sent at 11/7/2022  8:40 AM CST -----  Contact: Self  Type:  RX Refill Request    Who Called:  Patient  Refill or New Rx:  New Rx  RX Name and Strength:  isosorbide mononitrate (IMDUR) 30 MG 24 hr tablet   How is the patient currently taking it? (ex. 1XDay):  1XDay  Is this a 30 day or 90 day RX:  90  Preferred Pharmacy with phone number:    [id number] [address] & [address] [address]  Phone: [phone] Fax: [phone]  Local or Mail Order:  Local  Ordering Provider:  Ariella Noriega  Best Call Back Number:  [phone]  Additional Information:  Pt scheduled to come in Wednesday 11/9, but he is almost of of his medication. Please call pt back to confirm. Thank You

## 2022-11-09 ENCOUNTER — OFFICE VISIT (OUTPATIENT)
Dept: CARDIOLOGY | Facility: CLINIC | Age: 57
End: 2022-11-09
Payer: COMMERCIAL

## 2022-11-09 VITALS
BODY MASS INDEX: 23.1 KG/M2 | DIASTOLIC BLOOD PRESSURE: 90 MMHG | SYSTOLIC BLOOD PRESSURE: 138 MMHG | HEIGHT: 71 IN | HEART RATE: 64 BPM | WEIGHT: 165 LBS

## 2022-11-09 DIAGNOSIS — I25.10 CORONARY ARTERY DISEASE INVOLVING NATIVE CORONARY ARTERY OF NATIVE HEART WITHOUT ANGINA PECTORIS: Primary | ICD-10-CM

## 2022-11-09 DIAGNOSIS — E78.5 HYPERLIPIDEMIA, UNSPECIFIED HYPERLIPIDEMIA TYPE: ICD-10-CM

## 2022-11-09 DIAGNOSIS — F17.200 TOBACCO USE DISORDER, SEVERE, DEPENDENCE: ICD-10-CM

## 2022-11-09 DIAGNOSIS — F10.90 CHRONIC ALCOHOL USE: ICD-10-CM

## 2022-11-09 PROCEDURE — 99214 OFFICE O/P EST MOD 30 MIN: CPT | Mod: S$GLB,,, | Performed by: NURSE PRACTITIONER

## 2022-11-09 PROCEDURE — 3075F SYST BP GE 130 - 139MM HG: CPT | Mod: CPTII,S$GLB,, | Performed by: NURSE PRACTITIONER

## 2022-11-09 PROCEDURE — 1160F PR REVIEW ALL MEDS BY PRESCRIBER/CLIN PHARMACIST DOCUMENTED: ICD-10-PCS | Mod: CPTII,S$GLB,, | Performed by: NURSE PRACTITIONER

## 2022-11-09 PROCEDURE — 3008F PR BODY MASS INDEX (BMI) DOCUMENTED: ICD-10-PCS | Mod: CPTII,S$GLB,, | Performed by: NURSE PRACTITIONER

## 2022-11-09 PROCEDURE — 1159F PR MEDICATION LIST DOCUMENTED IN MEDICAL RECORD: ICD-10-PCS | Mod: CPTII,S$GLB,, | Performed by: NURSE PRACTITIONER

## 2022-11-09 PROCEDURE — 1160F RVW MEDS BY RX/DR IN RCRD: CPT | Mod: CPTII,S$GLB,, | Performed by: NURSE PRACTITIONER

## 2022-11-09 PROCEDURE — 3080F DIAST BP >= 90 MM HG: CPT | Mod: CPTII,S$GLB,, | Performed by: NURSE PRACTITIONER

## 2022-11-09 PROCEDURE — 93000 EKG 12-LEAD: ICD-10-PCS | Mod: S$GLB,,, | Performed by: INTERNAL MEDICINE

## 2022-11-09 PROCEDURE — 3008F BODY MASS INDEX DOCD: CPT | Mod: CPTII,S$GLB,, | Performed by: NURSE PRACTITIONER

## 2022-11-09 PROCEDURE — 99999 PR PBB SHADOW E&M-EST. PATIENT-LVL III: CPT | Mod: PBBFAC,,, | Performed by: NURSE PRACTITIONER

## 2022-11-09 PROCEDURE — 93000 ELECTROCARDIOGRAM COMPLETE: CPT | Mod: S$GLB,,, | Performed by: INTERNAL MEDICINE

## 2022-11-09 PROCEDURE — 1159F MED LIST DOCD IN RCRD: CPT | Mod: CPTII,S$GLB,, | Performed by: NURSE PRACTITIONER

## 2022-11-09 PROCEDURE — 99999 PR PBB SHADOW E&M-EST. PATIENT-LVL III: ICD-10-PCS | Mod: PBBFAC,,, | Performed by: NURSE PRACTITIONER

## 2022-11-09 PROCEDURE — 99214 PR OFFICE/OUTPT VISIT, EST, LEVL IV, 30-39 MIN: ICD-10-PCS | Mod: S$GLB,,, | Performed by: NURSE PRACTITIONER

## 2022-11-09 PROCEDURE — 3075F PR MOST RECENT SYSTOLIC BLOOD PRESS GE 130-139MM HG: ICD-10-PCS | Mod: CPTII,S$GLB,, | Performed by: NURSE PRACTITIONER

## 2022-11-09 PROCEDURE — 3080F PR MOST RECENT DIASTOLIC BLOOD PRESSURE >= 90 MM HG: ICD-10-PCS | Mod: CPTII,S$GLB,, | Performed by: NURSE PRACTITIONER

## 2022-11-09 NOTE — ASSESSMENT & PLAN NOTE
Continue Plavix 75 mg po daily and aspirin 81 mg po daily.   Continue Imdur 30 mg po daily.   EKG today shows sinus rhythm with right bundle branch block without ST-T wave changes noted.

## 2022-11-09 NOTE — PROGRESS NOTES
Subjective:    Patient ID:  Anatoly Hatch III is a 56 y.o. male   Chief Complaint   Patient presents with    Coronary Artery Disease       HPI:  Mr. Hatch presents today for follow up appointment. Patient states he has been feeling good overall. He does continue to smoke and drink alcohol but states he has cut back. Denies any chest pain, shortness of breath, falls, head injuries or bleeding issues.     Review of patient's allergies indicates:  No Known Allergies    Past Medical History:   Diagnosis Date    Abdominal abscess 09/21/2017    Appendicitis 09/21/2017    Hepatitis 2005    NSTEMI (non-ST elevated myocardial infarction) 8/4/2021     Past Surgical History:   Procedure Laterality Date    ANGIOGRAM, CORONARY, WITH LEFT HEART CATHETERIZATION N/A 8/5/2021    Procedure: Angiogram, Coronary, with Left Heart Cath;  Surgeon: Erasmo Slaughter MD;  Location: [facility] CATH/EP LAB;  Service: Cardiology;  Laterality: N/A;    APPENDECTOMY  09/21/2017    done at [facility] by Dr. Vasquez- Gama    SMALL INTESTINE SURGERY  02/25/2018    SB Resection, drng abscess - Dr. Mar - [facility]     Social History     Tobacco Use    Smoking status: Every Day     Packs/day: 1.00     Years: 30.00     Pack years: 30.00     Types: Cigarettes    Smokeless tobacco: Never   Substance Use Topics    Alcohol use: Yes     Comment: 4 glasses of whiskey daily    Drug use: No     History reviewed. No pertinent family history.     Review of Systems:   Constitution: Negative for diaphoresis and fever.   HEENT: Negative for nosebleeds.    Cardiovascular: Negative for chest pain       No dyspnea on exertion       No leg swelling        No palpitations  Respiratory: Negative for shortness of breath and wheezing.    Hematologic/Lymphatic: Negative for bleeding problem. Does not bruise/bleed easily.   Skin: Negative for color change and rash.   Musculoskeletal: Negative for falls and myalgias.   Gastrointestinal: Negative for hematemesis and hematochezia.    Genitourinary: Negative for hematuria.   Neurological: Negative for dizziness and light-headedness.   Psychiatric/Behavioral: Negative for altered mental status and memory loss.          Objective:        Vitals:    11/09/22 1440   BP: (!) 138/90   Pulse: 64       Lab Results   Component Value Date    WBC 8.36 12/08/2021    HGB 15.2 12/08/2021    HCT 44.9 12/08/2021     12/08/2021    CHOL 153 09/10/2021    TRIG 94 09/10/2021    HDL 49 09/10/2021    ALT 52 (H) 06/27/2022    AST 38 06/27/2022     01/11/2022    K 4.1 01/11/2022     01/11/2022    CREATININE 0.7 01/11/2022    BUN 17 01/11/2022    CO2 27 01/11/2022    INR 1.0 01/11/2022        ECHOCARDIOGRAM RESULTS  Results for orders placed during the hospital encounter of 09/10/21    Echo    Interpretation Summary  · The left ventricle is normal in size with concentric remodeling and normal systolic function.  · The estimated ejection fraction is 60%.  · Normal left ventricular diastolic function.  · Normal right ventricular size with normal right ventricular systolic function.        CURRENT/PREVIOUS VISIT EKG  Results for orders placed or performed during the hospital encounter of 12/08/21   EKG 12-lead    Collection Time: 12/08/21  2:27 PM    Narrative    Test Reason : R42,    Vent. Rate : 054 BPM     Atrial Rate : 054 BPM     P-R Int : 142 ms          QRS Dur : 148 ms      QT Int : 442 ms       P-R-T Axes : 071 061 033 degrees     QTc Int : 419 ms    Sinus bradycardia with marked sinus arrythmia  Right bundle branch block  Abnormal ECG  When compared with ECG of 08-DEC-2021 13:20,  Premature atrial complexes are no longer Present  Confirmed by Al Slaughter MD ([id number]) on 12/12/2021 9:29:11 PM    Referred By: VON   SELF           Confirmed By:Al Slaughter MD     No valid procedures specified.   No results found for this or any previous visit.      Physical Exam:  CONSTITUTIONAL: No fever, no chills  HEENT: Normocephalic,  atraumatic,pupils reactive to light                 NECK:  No JVD no carotid bruit  CVS: S1S2+, RRR  LUNGS: Clear  ABDOMEN: Soft, NT, BS+  EXTREMITIES: No cyanosis, edema  : No blackwell catheter  NEURO: AAO X 3  PSY: Normal affect      Medication List with Changes/Refills   Current Medications    ASPIRIN (ECOTRIN) 81 MG EC TABLET    Take 1 tablet (81 mg total) by mouth once daily.    CLOPIDOGREL (PLAVIX) 75 MG TABLET    Take 1 tablet (75 mg total) by mouth once daily.    ISOSORBIDE MONONITRATE (IMDUR) 30 MG 24 HR TABLET    Take 1 tablet (30 mg total) by mouth once daily.    METOPROLOL SUCCINATE (TOPROL-XL) 25 MG 24 HR TABLET    Take 1 tablet (25 mg total) by mouth once daily.    ROSUVASTATIN (CRESTOR) 10 MG TABLET    Take 1 tablet (10 mg total) by mouth once daily. Take instead of 40mg dose while on Epclusa   Discontinued Medications    ALBUTEROL (VENTOLIN HFA) 90 MCG/ACTUATION INHALER    Inhale 2 puffs into the lungs every 4 (four) hours as needed for Wheezing or Shortness of Breath. Rescue    BUDESONIDE-GLYCOPYR-FORMOTEROL (BREZTRI AEROSPHERE) 160-9-4.8 MCG/ACTUATION HFAA    Inhale 2 puffs into the lungs 2 (two) times a day.    CO-ENZYME Q-10 30 MG CAPSULE    Take 1 capsule (30 mg total) by mouth once daily.             Assessment:       1. Coronary artery disease involving native coronary artery of native heart without angina pectoris    2. Hyperlipidemia, unspecified hyperlipidemia type    3. Tobacco use disorder, severe, dependence    4. Chronic alcohol use         Plan:     Problem List Items Addressed This Visit          Unprioritized    Coronary artery disease involving native heart - Primary    Current Assessment & Plan     Continue Plavix 75 mg po daily and aspirin 81 mg po daily.   Continue Imdur 30 mg po daily.            Relevant Orders    IN OFFICE EKG 12-LEAD (to Muse)    Chronic alcohol use    Current Assessment & Plan     Recommend reduction/abstinence of ETOH.  Recommend adequate hydration with  water.          Tobacco use disorder, severe, dependence    Current Assessment & Plan     Recommend smoking cessation.          Hyperlipidemia    Current Assessment & Plan     Check Lipid panel.   Continue rosuvastatin 10 mg po daily.             Follow up in about 6 months (around 5/9/2023).

## 2022-11-22 ENCOUNTER — LAB VISIT (OUTPATIENT)
Dept: LAB | Facility: HOSPITAL | Age: 57
End: 2022-11-22
Attending: PHYSICIAN ASSISTANT
Payer: COMMERCIAL

## 2022-11-22 DIAGNOSIS — B18.2 CHRONIC HEPATITIS C WITHOUT HEPATIC COMA: ICD-10-CM

## 2022-11-22 PROCEDURE — 36415 COLL VENOUS BLD VENIPUNCTURE: CPT | Mod: PO | Performed by: PHYSICIAN ASSISTANT

## 2022-11-22 PROCEDURE — 80076 HEPATIC FUNCTION PANEL: CPT | Performed by: PHYSICIAN ASSISTANT

## 2022-11-22 PROCEDURE — 87522 HEPATITIS C REVRS TRNSCRPJ: CPT | Performed by: PHYSICIAN ASSISTANT

## 2022-11-23 LAB
ALBUMIN SERPL BCP-MCNC: 4 G/DL (ref 3.5–5.2)
ALP SERPL-CCNC: 50 U/L (ref 55–135)
ALT SERPL W/O P-5'-P-CCNC: 36 U/L (ref 10–44)
AST SERPL-CCNC: 36 U/L (ref 10–40)
BILIRUB DIRECT SERPL-MCNC: 0.4 MG/DL (ref 0.1–0.3)
BILIRUB SERPL-MCNC: 0.8 MG/DL (ref 0.1–1)
HCV RNA SERPL QL NAA+PROBE: NOT DETECTED
HCV RNA SPEC NAA+PROBE-ACNC: <12 IU/ML
PROT SERPL-MCNC: 7.5 G/DL (ref 6–8.4)

## 2022-11-27 ENCOUNTER — TELEPHONE (OUTPATIENT)
Dept: HEPATOLOGY | Facility: CLINIC | Age: 57
End: 2022-11-27
Payer: COMMERCIAL

## 2022-11-27 DIAGNOSIS — Z86.19 HEPATITIS C VIRUS INFECTION CURED AFTER ANTIVIRAL DRUG THERAPY: Primary | ICD-10-CM

## 2022-11-27 PROBLEM — B18.2 CHRONIC HEPATITIS C WITHOUT HEPATIC COMA: Status: RESOLVED | Noted: 2021-09-10 | Resolved: 2022-11-27

## 2022-11-28 DIAGNOSIS — Z86.19 HEPATITIS C VIRUS INFECTION CURED AFTER ANTIVIRAL DRUG THERAPY: Primary | ICD-10-CM

## 2022-11-28 NOTE — TELEPHONE ENCOUNTER
Pt began 12 weeks of Epclusa on 5/29/22  Anticipated treatment end date: 8/20/22  Steffanie 2b  Treatment naive  Advanced fibrosis    11/22/22  LFT normal  HCV neg    These labs document SVR12 following successful HCV treatment with Epclusa    please tell patient:  1.) Lab test shows there is NO Hepatitis C in the blood. This means the Hepatitis C is cured!!  We do not expect the virus to return.  This does not give protection from Hepatitis C and patient could be infected again if ever exposed to the virus again.    2.) Cirrhosis is still present and patient needs monitoring of liver and liver cancer screening every 6 months for the rest of their life. This is due again in 1/2023    Please schedule   - HCV RNA in 1/2023    Keep labs, MRI, visit in Jan as scheduled

## 2022-12-06 ENCOUNTER — TELEPHONE (OUTPATIENT)
Dept: FAMILY MEDICINE | Facility: CLINIC | Age: 57
End: 2022-12-06
Payer: COMMERCIAL

## 2022-12-06 ENCOUNTER — PATIENT MESSAGE (OUTPATIENT)
Dept: ADMINISTRATIVE | Facility: HOSPITAL | Age: 57
End: 2022-12-06
Payer: COMMERCIAL

## 2022-12-06 NOTE — TELEPHONE ENCOUNTER
----- Message from Maricruz Rowley LPN sent at 12/6/2022 11:38 AM CST -----  Regarding: FW: ldct    ----- Message -----  From: RT Karsten  Sent: 12/6/2022  11:18 AM CST  To: Mika Krishnan Staff  Subject: ldct                                             I see an order for a chest without for this pt, they are due to schedule low dose lung screen follow up.      Nicolasa Clemons, KARY (R)(CT)   Senior CT Technologist   [facility]    [facility] Lung Screening Coordinator   [phone]

## 2022-12-06 NOTE — TELEPHONE ENCOUNTER
Can you please see message below, pt is due for a ct chest with out. It looks like th pt refused when he was called. Will need a new order if you are in agreement.  Please advise. Thank you !

## 2022-12-30 ENCOUNTER — PATIENT MESSAGE (OUTPATIENT)
Dept: ADMINISTRATIVE | Facility: HOSPITAL | Age: 57
End: 2022-12-30
Payer: COMMERCIAL

## 2023-01-04 PROCEDURE — 99285 EMERGENCY DEPT VISIT HI MDM: CPT | Mod: 25

## 2023-01-04 RX ORDER — MORPHINE SULFATE 2 MG/ML
6 INJECTION, SOLUTION INTRAMUSCULAR; INTRAVENOUS
Status: COMPLETED | OUTPATIENT
Start: 2023-01-04 | End: 2023-01-05

## 2023-01-04 RX ORDER — ONDANSETRON 2 MG/ML
4 INJECTION INTRAMUSCULAR; INTRAVENOUS
Status: COMPLETED | OUTPATIENT
Start: 2023-01-04 | End: 2023-01-05

## 2023-01-05 ENCOUNTER — HOSPITAL ENCOUNTER (INPATIENT)
Facility: HOSPITAL | Age: 58
LOS: 2 days | Discharge: HOME | DRG: 390 | End: 2023-01-07
Attending: EMERGENCY MEDICINE | Admitting: STUDENT IN AN ORGANIZED HEALTH CARE EDUCATION/TRAINING PROGRAM
Payer: COMMERCIAL

## 2023-01-05 DIAGNOSIS — K56.609 SMALL BOWEL OBSTRUCTION: ICD-10-CM

## 2023-01-05 DIAGNOSIS — K56.609 INTESTINAL OBSTRUCTION, UNSPECIFIED CAUSE, UNSPECIFIED WHETHER PARTIAL OR COMPLETE: Primary | ICD-10-CM

## 2023-01-05 LAB
ALBUMIN SERPL BCP-MCNC: 4.3 G/DL (ref 3.5–5.2)
ALBUMIN SERPL BCP-MCNC: 4.5 G/DL (ref 3.5–5.2)
ALP SERPL-CCNC: 61 U/L (ref 55–135)
ALP SERPL-CCNC: 64 U/L (ref 55–135)
ALT SERPL W/O P-5'-P-CCNC: 23 U/L (ref 10–44)
ALT SERPL W/O P-5'-P-CCNC: 25 U/L (ref 10–44)
ANION GAP SERPL CALC-SCNC: 13 MMOL/L (ref 8–16)
ANION GAP SERPL CALC-SCNC: 13 MMOL/L (ref 8–16)
AST SERPL-CCNC: 23 U/L (ref 10–40)
AST SERPL-CCNC: 25 U/L (ref 10–40)
BASOPHILS # BLD AUTO: 0.07 K/UL (ref 0–0.2)
BASOPHILS # BLD AUTO: 0.08 K/UL (ref 0–0.2)
BASOPHILS NFR BLD: 0.6 % (ref 0–1.9)
BASOPHILS NFR BLD: 0.6 % (ref 0–1.9)
BILIRUB SERPL-MCNC: 1.3 MG/DL (ref 0.1–1)
BILIRUB SERPL-MCNC: 1.4 MG/DL (ref 0.1–1)
BILIRUB UR QL STRIP: ABNORMAL
BUN SERPL-MCNC: 12 MG/DL (ref 6–20)
BUN SERPL-MCNC: 14 MG/DL (ref 6–20)
CALCIUM SERPL-MCNC: 10.1 MG/DL (ref 8.7–10.5)
CALCIUM SERPL-MCNC: 9.9 MG/DL (ref 8.7–10.5)
CHLORIDE SERPL-SCNC: 101 MMOL/L (ref 95–110)
CHLORIDE SERPL-SCNC: 102 MMOL/L (ref 95–110)
CLARITY UR: CLEAR
CO2 SERPL-SCNC: 23 MMOL/L (ref 23–29)
CO2 SERPL-SCNC: 24 MMOL/L (ref 23–29)
COLOR UR: YELLOW
CREAT SERPL-MCNC: 0.8 MG/DL (ref 0.5–1.4)
CREAT SERPL-MCNC: 0.9 MG/DL (ref 0.5–1.4)
DIFFERENTIAL METHOD: ABNORMAL
DIFFERENTIAL METHOD: ABNORMAL
EOSINOPHIL # BLD AUTO: 0.1 K/UL (ref 0–0.5)
EOSINOPHIL # BLD AUTO: 0.2 K/UL (ref 0–0.5)
EOSINOPHIL NFR BLD: 0.9 % (ref 0–8)
EOSINOPHIL NFR BLD: 1.7 % (ref 0–8)
ERYTHROCYTE [DISTWIDTH] IN BLOOD BY AUTOMATED COUNT: 13.1 % (ref 11.5–14.5)
ERYTHROCYTE [DISTWIDTH] IN BLOOD BY AUTOMATED COUNT: 13.1 % (ref 11.5–14.5)
EST. GFR  (NO RACE VARIABLE): >60 ML/MIN/1.73 M^2
EST. GFR  (NO RACE VARIABLE): >60 ML/MIN/1.73 M^2
GLUCOSE SERPL-MCNC: 115 MG/DL (ref 70–110)
GLUCOSE SERPL-MCNC: 122 MG/DL (ref 70–110)
GLUCOSE UR QL STRIP: NEGATIVE
HCT VFR BLD AUTO: 44 % (ref 40–54)
HCT VFR BLD AUTO: 45.9 % (ref 40–54)
HGB BLD-MCNC: 15.1 G/DL (ref 14–18)
HGB BLD-MCNC: 16 G/DL (ref 14–18)
HGB UR QL STRIP: ABNORMAL
IMM GRANULOCYTES # BLD AUTO: 0.04 K/UL (ref 0–0.04)
IMM GRANULOCYTES # BLD AUTO: 0.04 K/UL (ref 0–0.04)
IMM GRANULOCYTES NFR BLD AUTO: 0.3 % (ref 0–0.5)
IMM GRANULOCYTES NFR BLD AUTO: 0.4 % (ref 0–0.5)
KETONES UR QL STRIP: ABNORMAL
LEUKOCYTE ESTERASE UR QL STRIP: NEGATIVE
LIPASE SERPL-CCNC: 10 U/L (ref 4–60)
LYMPHOCYTES # BLD AUTO: 3 K/UL (ref 1–4.8)
LYMPHOCYTES # BLD AUTO: 3.3 K/UL (ref 1–4.8)
LYMPHOCYTES NFR BLD: 25.5 % (ref 18–48)
LYMPHOCYTES NFR BLD: 27 % (ref 18–48)
MAGNESIUM SERPL-MCNC: 2 MG/DL (ref 1.6–2.6)
MCH RBC QN AUTO: 31.7 PG (ref 27–31)
MCH RBC QN AUTO: 32.1 PG (ref 27–31)
MCHC RBC AUTO-ENTMCNC: 34.3 G/DL (ref 32–36)
MCHC RBC AUTO-ENTMCNC: 34.9 G/DL (ref 32–36)
MCV RBC AUTO: 92 FL (ref 82–98)
MCV RBC AUTO: 92 FL (ref 82–98)
MONOCYTES # BLD AUTO: 0.9 K/UL (ref 0.3–1)
MONOCYTES # BLD AUTO: 1 K/UL (ref 0.3–1)
MONOCYTES NFR BLD: 7.5 % (ref 4–15)
MONOCYTES NFR BLD: 8.3 % (ref 4–15)
NEUTROPHILS # BLD AUTO: 6.9 K/UL (ref 1.8–7.7)
NEUTROPHILS # BLD AUTO: 8.3 K/UL (ref 1.8–7.7)
NEUTROPHILS NFR BLD: 62.8 % (ref 38–73)
NEUTROPHILS NFR BLD: 64.4 % (ref 38–73)
NITRITE UR QL STRIP: NEGATIVE
NRBC BLD-RTO: 0 /100 WBC
NRBC BLD-RTO: 0 /100 WBC
PH UR STRIP: 6 [PH] (ref 5–8)
PLATELET # BLD AUTO: 261 K/UL (ref 150–450)
PLATELET # BLD AUTO: 264 K/UL (ref 150–450)
PMV BLD AUTO: 10 FL (ref 9.2–12.9)
PMV BLD AUTO: 10.5 FL (ref 9.2–12.9)
POTASSIUM SERPL-SCNC: 4 MMOL/L (ref 3.5–5.1)
POTASSIUM SERPL-SCNC: 4.1 MMOL/L (ref 3.5–5.1)
PROT SERPL-MCNC: 7.5 G/DL (ref 6–8.4)
PROT SERPL-MCNC: 8 G/DL (ref 6–8.4)
PROT UR QL STRIP: ABNORMAL
RBC # BLD AUTO: 4.76 M/UL (ref 4.6–6.2)
RBC # BLD AUTO: 4.98 M/UL (ref 4.6–6.2)
SODIUM SERPL-SCNC: 137 MMOL/L (ref 136–145)
SODIUM SERPL-SCNC: 139 MMOL/L (ref 136–145)
SP GR UR STRIP: 1.02 (ref 1–1.03)
URN SPEC COLLECT METH UR: ABNORMAL
UROBILINOGEN UR STRIP-ACNC: 1 EU/DL
WBC # BLD AUTO: 10.98 K/UL (ref 3.9–12.7)
WBC # BLD AUTO: 12.87 K/UL (ref 3.9–12.7)

## 2023-01-05 PROCEDURE — 99900035 HC TECH TIME PER 15 MIN (STAT)

## 2023-01-05 PROCEDURE — 96376 TX/PRO/DX INJ SAME DRUG ADON: CPT

## 2023-01-05 PROCEDURE — 25500020 PHARM REV CODE 255

## 2023-01-05 PROCEDURE — 83735 ASSAY OF MAGNESIUM: CPT | Performed by: NURSE PRACTITIONER

## 2023-01-05 PROCEDURE — 96361 HYDRATE IV INFUSION ADD-ON: CPT

## 2023-01-05 PROCEDURE — 36415 COLL VENOUS BLD VENIPUNCTURE: CPT | Performed by: PHYSICIAN ASSISTANT

## 2023-01-05 PROCEDURE — 85025 COMPLETE CBC W/AUTO DIFF WBC: CPT | Mod: 91 | Performed by: PHYSICIAN ASSISTANT

## 2023-01-05 PROCEDURE — 63600175 PHARM REV CODE 636 W HCPCS: Performed by: PHYSICIAN ASSISTANT

## 2023-01-05 PROCEDURE — 80053 COMPREHEN METABOLIC PANEL: CPT | Mod: 91 | Performed by: PHYSICIAN ASSISTANT

## 2023-01-05 PROCEDURE — 25000003 PHARM REV CODE 250: Performed by: NURSE PRACTITIONER

## 2023-01-05 PROCEDURE — 63600175 PHARM REV CODE 636 W HCPCS: Performed by: NURSE PRACTITIONER

## 2023-01-05 PROCEDURE — 96374 THER/PROPH/DIAG INJ IV PUSH: CPT

## 2023-01-05 PROCEDURE — 36415 COLL VENOUS BLD VENIPUNCTURE: CPT | Performed by: NURSE PRACTITIONER

## 2023-01-05 PROCEDURE — 80053 COMPREHEN METABOLIC PANEL: CPT | Performed by: NURSE PRACTITIONER

## 2023-01-05 PROCEDURE — 83690 ASSAY OF LIPASE: CPT | Performed by: PHYSICIAN ASSISTANT

## 2023-01-05 PROCEDURE — 81003 URINALYSIS AUTO W/O SCOPE: CPT | Performed by: PHYSICIAN ASSISTANT

## 2023-01-05 PROCEDURE — 85025 COMPLETE CBC W/AUTO DIFF WBC: CPT | Performed by: NURSE PRACTITIONER

## 2023-01-05 PROCEDURE — 12000002 HC ACUTE/MED SURGE SEMI-PRIVATE ROOM

## 2023-01-05 PROCEDURE — 96375 TX/PRO/DX INJ NEW DRUG ADDON: CPT

## 2023-01-05 PROCEDURE — 94760 N-INVAS EAR/PLS OXIMETRY 1: CPT

## 2023-01-05 PROCEDURE — 63600175 PHARM REV CODE 636 W HCPCS: Performed by: EMERGENCY MEDICINE

## 2023-01-05 RX ORDER — TALC
9 POWDER (GRAM) TOPICAL NIGHTLY PRN
Status: DISCONTINUED | OUTPATIENT
Start: 2023-01-05 | End: 2023-01-07 | Stop reason: HOSPADM

## 2023-01-05 RX ORDER — MAG HYDROX/ALUMINUM HYD/SIMETH 200-200-20
30 SUSPENSION, ORAL (FINAL DOSE FORM) ORAL 4 TIMES DAILY PRN
Status: DISCONTINUED | OUTPATIENT
Start: 2023-01-05 | End: 2023-01-07 | Stop reason: HOSPADM

## 2023-01-05 RX ORDER — MORPHINE SULFATE 4 MG/ML
4 INJECTION, SOLUTION INTRAMUSCULAR; INTRAVENOUS ONCE
Status: DISCONTINUED | OUTPATIENT
Start: 2023-01-05 | End: 2023-01-07 | Stop reason: HOSPADM

## 2023-01-05 RX ORDER — SODIUM CHLORIDE 9 MG/ML
INJECTION, SOLUTION INTRAVENOUS ONCE
Status: COMPLETED | OUTPATIENT
Start: 2023-01-05 | End: 2023-01-05

## 2023-01-05 RX ORDER — SODIUM,POTASSIUM PHOSPHATES 280-250MG
2 POWDER IN PACKET (EA) ORAL
Status: DISCONTINUED | OUTPATIENT
Start: 2023-01-05 | End: 2023-01-07 | Stop reason: HOSPADM

## 2023-01-05 RX ORDER — KETOROLAC TROMETHAMINE 30 MG/ML
30 INJECTION, SOLUTION INTRAMUSCULAR; INTRAVENOUS EVERY 6 HOURS
Status: DISPENSED | OUTPATIENT
Start: 2023-01-05 | End: 2023-01-06

## 2023-01-05 RX ORDER — ACETAMINOPHEN 325 MG/1
650 TABLET ORAL EVERY 6 HOURS PRN
Status: DISCONTINUED | OUTPATIENT
Start: 2023-01-05 | End: 2023-01-07 | Stop reason: HOSPADM

## 2023-01-05 RX ORDER — ONDANSETRON 2 MG/ML
4 INJECTION INTRAMUSCULAR; INTRAVENOUS EVERY 8 HOURS PRN
Status: DISCONTINUED | OUTPATIENT
Start: 2023-01-05 | End: 2023-01-07 | Stop reason: HOSPADM

## 2023-01-05 RX ORDER — NALOXONE HCL 0.4 MG/ML
0.02 VIAL (ML) INJECTION
Status: DISCONTINUED | OUTPATIENT
Start: 2023-01-05 | End: 2023-01-07 | Stop reason: HOSPADM

## 2023-01-05 RX ORDER — IPRATROPIUM BROMIDE AND ALBUTEROL SULFATE 2.5; .5 MG/3ML; MG/3ML
3 SOLUTION RESPIRATORY (INHALATION) EVERY 4 HOURS PRN
Status: DISCONTINUED | OUTPATIENT
Start: 2023-01-05 | End: 2023-01-07 | Stop reason: HOSPADM

## 2023-01-05 RX ORDER — ACETAMINOPHEN 325 MG/1
650 TABLET ORAL EVERY 4 HOURS PRN
Status: DISCONTINUED | OUTPATIENT
Start: 2023-01-05 | End: 2023-01-07 | Stop reason: HOSPADM

## 2023-01-05 RX ORDER — LANOLIN ALCOHOL/MO/W.PET/CERES
800 CREAM (GRAM) TOPICAL
Status: DISCONTINUED | OUTPATIENT
Start: 2023-01-05 | End: 2023-01-07 | Stop reason: HOSPADM

## 2023-01-05 RX ORDER — MORPHINE SULFATE 4 MG/ML
4 INJECTION, SOLUTION INTRAMUSCULAR; INTRAVENOUS EVERY 4 HOURS PRN
Status: DISCONTINUED | OUTPATIENT
Start: 2023-01-05 | End: 2023-01-07 | Stop reason: HOSPADM

## 2023-01-05 RX ORDER — IBUPROFEN 200 MG
16 TABLET ORAL
Status: DISCONTINUED | OUTPATIENT
Start: 2023-01-05 | End: 2023-01-07 | Stop reason: HOSPADM

## 2023-01-05 RX ORDER — SODIUM CHLORIDE 0.9 % (FLUSH) 0.9 %
10 SYRINGE (ML) INJECTION EVERY 8 HOURS PRN
Status: DISCONTINUED | OUTPATIENT
Start: 2023-01-05 | End: 2023-01-07 | Stop reason: HOSPADM

## 2023-01-05 RX ORDER — SIMETHICONE 80 MG
1 TABLET,CHEWABLE ORAL 4 TIMES DAILY PRN
Status: DISCONTINUED | OUTPATIENT
Start: 2023-01-05 | End: 2023-01-07 | Stop reason: HOSPADM

## 2023-01-05 RX ORDER — GLUCAGON 1 MG
1 KIT INJECTION
Status: DISCONTINUED | OUTPATIENT
Start: 2023-01-05 | End: 2023-01-07 | Stop reason: HOSPADM

## 2023-01-05 RX ORDER — MORPHINE SULFATE 4 MG/ML
4 INJECTION, SOLUTION INTRAMUSCULAR; INTRAVENOUS
Status: COMPLETED | OUTPATIENT
Start: 2023-01-05 | End: 2023-01-05

## 2023-01-05 RX ORDER — SODIUM CHLORIDE 9 MG/ML
INJECTION, SOLUTION INTRAVENOUS CONTINUOUS
Status: DISCONTINUED | OUTPATIENT
Start: 2023-01-05 | End: 2023-01-06

## 2023-01-05 RX ORDER — IBUPROFEN 200 MG
24 TABLET ORAL
Status: DISCONTINUED | OUTPATIENT
Start: 2023-01-05 | End: 2023-01-07 | Stop reason: HOSPADM

## 2023-01-05 RX ADMIN — SODIUM CHLORIDE, POTASSIUM CHLORIDE, SODIUM LACTATE AND CALCIUM CHLORIDE 1000 ML: 600; 310; 30; 20 INJECTION, SOLUTION INTRAVENOUS at 12:01

## 2023-01-05 RX ADMIN — MORPHINE SULFATE 4 MG: 4 INJECTION INTRAVENOUS at 08:01

## 2023-01-05 RX ADMIN — MORPHINE SULFATE 6 MG: 2 INJECTION, SOLUTION INTRAMUSCULAR; INTRAVENOUS at 12:01

## 2023-01-05 RX ADMIN — SODIUM CHLORIDE: 9 INJECTION, SOLUTION INTRAVENOUS at 06:01

## 2023-01-05 RX ADMIN — ONDANSETRON 4 MG: 2 INJECTION INTRAMUSCULAR; INTRAVENOUS at 12:01

## 2023-01-05 RX ADMIN — IOHEXOL 75 ML: 350 INJECTION, SOLUTION INTRAVENOUS at 01:01

## 2023-01-05 RX ADMIN — KETOROLAC TROMETHAMINE 30 MG: 30 INJECTION, SOLUTION INTRAMUSCULAR; INTRAVENOUS at 01:01

## 2023-01-05 RX ADMIN — MORPHINE SULFATE 4 MG: 4 INJECTION INTRAVENOUS at 12:01

## 2023-01-05 RX ADMIN — SODIUM CHLORIDE: 9 INJECTION, SOLUTION INTRAVENOUS at 01:01

## 2023-01-05 RX ADMIN — MORPHINE SULFATE 4 MG: 4 INJECTION INTRAVENOUS at 04:01

## 2023-01-05 NOTE — PLAN OF CARE
[facility] - Emergency Dept  Initial Discharge Assessment       Primary Care Provider: Bryan Brandt MD    Admission Diagnosis: Intestinal obstruction, unspecified cause, unspecified whether partial or complete [K56.609]    Admission Date: 1/5/2023  Expected Discharge Date: 1/6/2023    Pt confirmed home address and lives alone. Pt denied HH and DME. Confirmed PCP as Dr. Brandt and pharmacy as [facility]. Pt has [employer] insurance and will provide his own transport home. CM following for additional needs.     Discharge Barriers Identified: None    Payor: [employer] / Plan: [facility] SHARED SERVICES  / Product Type: Commercial /     Extended Emergency Contact Information  Primary Emergency Contact: Elizabet Hatch  Address: unknown           [address]  Mobile Phone: [phone]  Relation: Sister    Discharge Plan A: Home  Discharge Plan B: Home with family      [id number] - [place], [place] - [address]  AT [facility]  [address]  [place] [address]  Phone: [phone] Fax: [phone]    [facility]  [address]  [place] [address]  Phone: [phone] Fax: [phone]    [facility] - [place], [place] - [address]  [address]  [place] [address]  Phone: [phone] Fax: [phone]    [id number] - [place] [place] - [address] AT [facility] [address]  [address]  [place] [address]  Phone: [phone] Fax: [phone]      Initial Assessment (most recent)       Adult Discharge Assessment - 01/05/23 1149          Discharge Assessment    Assessment Type Discharge Planning Assessment     Confirmed/corrected address, phone number and insurance Yes     Confirmed Demographics Correct on Facesheet     Source of Information patient     Does patient/caregiver understand observation status Yes     Communicated MILDRED with patient/caregiver Yes      Reason For Admission Intestinal obstruction     People in Home alone     Facility Arrived From: home     Do you expect to return to your current living situation? Yes     Do you have help at home or someone to help you manage your care at home? Yes     Who are your caregiver(s) and their phone number(s)? sister Elizabet Hatch [phone]     Prior to hospitilization cognitive status: Alert/Oriented     Current cognitive status: Alert/Oriented     Home Layout Able to live on 1st floor     Equipment Currently Used at Home none     Readmission within 30 days? No     Patient currently being followed by outpatient case management? No     Do you currently have service(s) that help you manage your care at home? No     Do you take prescription medications? Yes     Do you have prescription coverage? Yes     Do you have any problems affording any of your prescribed medications? No     Is the patient taking medications as prescribed? yes     Who is going to help you get home at discharge? sister Elizabet Hatch [phone]     How do you get to doctors appointments? car, drives self     Are you on dialysis? No     Do you take coumadin? No     Discharge Plan A Home     Discharge Plan B Home with family     DME Needed Upon Discharge  none     Discharge Plan discussed with: Patient     Discharge Barriers Identified None

## 2023-01-05 NOTE — Clinical Note
Diagnosis: Intestinal obstruction, unspecified cause, unspecified whether partial or complete [[id number]]   Future Attending Provider: HORACIO CALIXTO [[id number]]   Admitting Provider:: HORACIO CALIXTO [[id number]]   Special Needs:: No Special Needs [1]

## 2023-01-05 NOTE — SUBJECTIVE & OBJECTIVE
Past Medical History:   Diagnosis Date    Abdominal abscess 09/21/2017    Appendicitis 09/21/2017    Hepatitis C virus infection cured after antiviral drug therapy     treated / cured (SVR12 - 11/2022)    NSTEMI (non-ST elevated myocardial infarction) 08/04/2021       Past Surgical History:   Procedure Laterality Date    ANGIOGRAM, CORONARY, WITH LEFT HEART CATHETERIZATION N/A 8/5/2021    Procedure: Angiogram, Coronary, with Left Heart Cath;  Surgeon: Erasmo Slaughter MD;  Location: [facility] CATH/EP LAB;  Service: Cardiology;  Laterality: N/A;    APPENDECTOMY  09/21/2017    done at [facility] by Dr. Vasquez- Gama    SMALL INTESTINE SURGERY  02/25/2018    SB Resection, drng abscess - Dr. Mar - [facility]       Review of patient's allergies indicates:  No Known Allergies    No current facility-administered medications on file prior to encounter.     Current Outpatient Medications on File Prior to Encounter   Medication Sig    aspirin (ECOTRIN) 81 MG EC tablet Take 1 tablet (81 mg total) by mouth once daily.    clopidogreL (PLAVIX) 75 mg tablet Take 1 tablet (75 mg total) by mouth once daily.    isosorbide mononitrate (IMDUR) 30 MG 24 hr tablet Take 1 tablet (30 mg total) by mouth once daily.    metoprolol succinate (TOPROL-XL) 25 MG 24 hr tablet Take 1 tablet (25 mg total) by mouth once daily.    rosuvastatin (CRESTOR) 10 MG tablet Take 1 tablet (10 mg total) by mouth once daily. Take instead of 40mg dose while on Epclusa     Family History    None       Tobacco Use    Smoking status: Every Day     Packs/day: 1.00     Years: 30.00     Pack years: 30.00     Types: Cigarettes    Smokeless tobacco: Never   Substance and Sexual Activity    Alcohol use: Yes     Comment: 4 glasses of whiskey daily    Drug use: No    Sexual activity: Not on file     Review of Systems   Constitutional:  Positive for activity change and appetite change. Negative for chills, diaphoresis and fever.   HENT:  Negative for congestion, nosebleeds and tinnitus.     Eyes:  Negative for photophobia and visual disturbance.   Respiratory:  Negative for cough, chest tightness, shortness of breath and wheezing.    Cardiovascular:  Negative for chest pain, palpitations and leg swelling.   Gastrointestinal:  Positive for abdominal distention, abdominal pain, constipation, nausea and vomiting. Negative for diarrhea.   Endocrine: Negative for cold intolerance and heat intolerance.   Genitourinary:  Negative for difficulty urinating, dysuria, frequency, hematuria and urgency.   Musculoskeletal:  Negative for arthralgias, back pain and myalgias.   Skin:  Negative for pallor, rash and wound.   Allergic/Immunologic: Negative for immunocompromised state.   Neurological:  Negative for dizziness, tremors, facial asymmetry, speech difficulty and weakness.   Hematological:  Negative for adenopathy. Does not bruise/bleed easily.   Psychiatric/Behavioral:  Negative for confusion and sleep disturbance. The patient is not nervous/anxious.    Objective:     Vital Signs (Most Recent):  Temp: 97.8 °F (36.6 °C) (01/04/23 2235)  Pulse: 70 (01/05/23 0332)  Resp: 16 (01/05/23 0457)  BP: 125/83 (01/05/23 0332)  SpO2: (!) 94 % (01/05/23 0332)   Vital Signs (24h Range):  Temp:  [97.8 °F (36.6 °C)] 97.8 °F (36.6 °C)  Pulse:  [70-89] 70  Resp:  [16-20] 16  SpO2:  [93 %-99 %] 94 %  BP: (125-145)/(83-92) 125/83     Weight: 74.8 kg (165 lb)  Body mass index is 23.01 kg/m².    Physical Exam  Vitals and nursing note reviewed.   Constitutional:       General: He is not in acute distress.     Appearance: He is well-developed and normal weight. He is ill-appearing. He is not diaphoretic.   HENT:      Head: Normocephalic.      Mouth/Throat:      Mouth: Mucous membranes are dry.   Eyes:      General: No scleral icterus.     Conjunctiva/sclera: Conjunctivae normal.      Pupils: Pupils are equal, round, and reactive to light.   Neck:      Vascular: No JVD.   Cardiovascular:      Rate and Rhythm: Normal rate and regular  rhythm.      Heart sounds: Normal heart sounds. No murmur heard.    No friction rub. No gallop.   Pulmonary:      Effort: Pulmonary effort is normal. No respiratory distress.      Breath sounds: Wheezing present. No rales.      Comments: Mild scattered expiratory wheezing  Abdominal:      General: Bowel sounds are normal. There is no distension.      Palpations: Abdomen is soft.      Tenderness: There is abdominal tenderness. There is no guarding or rebound.      Comments: Tenderness in lower abdominal region   Musculoskeletal:         General: No tenderness. Normal range of motion.      Cervical back: Normal range of motion and neck supple.   Lymphadenopathy:      Cervical: No cervical adenopathy.   Skin:     General: Skin is warm and dry.      Capillary Refill: Capillary refill takes less than 2 seconds.      Coloration: Skin is not pale.      Findings: No erythema or rash.   Neurological:      Mental Status: He is alert and oriented to person, place, and time.      Cranial Nerves: No cranial nerve deficit.      Sensory: No sensory deficit.      Coordination: Coordination normal.      Deep Tendon Reflexes: Reflexes normal.   Psychiatric:         Mood and Affect: Mood normal.         Behavior: Behavior normal.         Thought Content: Thought content normal.         Judgment: Judgment normal.         CRANIAL NERVES     CN III, IV, VI   Pupils are equal, round, and reactive to light.     Significant Labs: All pertinent labs within the past 24 hours have been reviewed.  CBC:   Recent Labs   Lab 01/05/23  0029   WBC 12.87*   HGB 16.0   HCT 45.9        CMP:   Recent Labs   Lab 01/05/23  0101      K 4.0      CO2 23   *   BUN 14   CREATININE 0.9   CALCIUM 10.1   PROT 8.0   ALBUMIN 4.5   BILITOT 1.3*   ALKPHOS 64   AST 25   ALT 25   ANIONGAP 13     Urine Studies:   Recent Labs   Lab 01/05/23  0020   COLORU Yellow   APPEARANCEUA Clear   PHUR 6.0   SPECGRAV 1.025   PROTEINUA Trace*   GLUCUA  Negative   KETONESU 1+*   BILIRUBINUA 1+*   OCCULTUA Trace*   NITRITE Negative   UROBILINOGEN 1.0   LEUKOCYTESUR Negative       Significant Imaging: I have reviewed all pertinent imaging results/findings within the past 24 hours.    CT abdomen pelvis:     FINDINGS:  Liver: Unremarkable. No mass.  Gallbladder and bile ducts: Normal. No calcified stones. No ductal dilation.  Pancreas: Normal. No ductal dilation.  Spleen: Normal. No splenomegaly.  Adrenal glands: Normal. No mass.  Kidneys and ureters: Complex cystic mass lower pole left kidney previously measured 2.7 x 2.9 x  5.2 cm craniocaudad and now measures 3.3 x 3.3 x 5.4 cm craniocaudad; the complex cystic  component which previously measured about 2.0 x 2.9 cm axial now measures about 2.3 x 2.8 cm  axial.  Stomach and bowel: There are multiple loops of distended small bowel out of proportion relative to  the descending colon with multiple scattered air-fluid levels. There are multiple colonic diverticuli.  Small bowel transition zone located in the right lower quadrant just below the pelvic inlet. Negative for  bowel pneumatosis, mesenteric/portal gas. Postsurgical changes of small bowel.  Appendix: No evidence of appendicitis.  Intraperitoneal space: Trace amount perihepatic ascites.  Vasculature: Atherosclerotic vascular calcifications are noted involving the aorta.  Lymph nodes: Unremarkable. No enlarged lymph nodes.  Urinary bladder: Unremarkable as visualized.  Reproductive: Unremarkable as visualized.  Bones/joints: Mesenteric fluid and edema are noted at multiple levels. One or more vertebral body  endplate Schmorl nodes are noted at one or more levels.  Soft tissues: Unremarkable.  IMPRESSION:  1. Small bowel obstruction likely secondary to adhesion with transition zone in the right lower  abdomen just below the pelvic inlet. Negative for bowel pneumatosis, portal/mesenteric gas.  2. Complex cyst left kidney with marginal simple appearing cyst appears  slightly enlarged over all  however this likely accounts for the simple portion of the mass as the complex portion of the mass is  probably stable.

## 2023-01-05 NOTE — HPI
Anatoly Hatch is a 57-year-old male who presents emergency room complaining of abdominal pain.  Reports abdominal pain onset at 3:00 p.m. yesterday.  He describes the abdominal pain is a crampy sensation in lower abdominal region.  No aggravating or alleviating factors.  He denies any fever or chills.  He does endorse some constipation and vomiting associated.  Previous medical history includes ETOH abuse, hep C, hyperlipidemia, active smoker, coronary artery disease.  Surgical history includes appendectomy, and exploratory laparotomy with bowel resection in 2 areas of small bowel in 2018 at [facility].  ER workup:  CBC with leukocytosis of 13,000.  CMP with bilirubin of 1.3 otherwise unremarkable.  Lipase unremarkable.  Urinalysis with trace occult blood.  CT the abdomen pelvis demonstrates small bowel obstruction secondary to adhesions with transition point in the right lower abdominal region.  Patient admitted to Hospital Medicine for treatment management.  Will maintain patient NPO, pain medication, antiemetics, IV fluids, and consult General surgery in a.m..

## 2023-01-05 NOTE — ED NOTES
"Pt c/o "all over" abdominal pain - yet continues to rub epigastric region when describing the pain.  Describes pain as "cramps that are tight and it's like it's a long spasm."  Stated the pain was intermittent but has been consistent since coming to ER lobby.  "

## 2023-01-05 NOTE — PROGRESS NOTES
[facility] Hospital Medicine  Progress Note    Patient Name: Anatoly Hatch III  MRN: [id number]  Patient Class: IP- Inpatient   Admission Date: 1/5/2023  Length of Stay: 0 days  Attending Physician: Alejo Faustin MD  Primary Care Provider: Bryan Brandt MD        Subjective:     Principal Problem:Small bowel obstruction        HPI:  Anatoly Hatch is a 57-year-old male who presents emergency room complaining of abdominal pain.  Reports abdominal pain onset at 3:00 p.m. yesterday.  He describes the abdominal pain is a crampy sensation in lower abdominal region.  No aggravating or alleviating factors.  He denies any fever or chills.  He does endorse some constipation and vomiting associated.  Previous medical history includes ETOH abuse, hep C, hyperlipidemia, active smoker, coronary artery disease.  Surgical history includes appendectomy, and exploratory laparotomy with bowel resection in 2 areas of small bowel in 2018 at [facility].  ER workup:  CBC with leukocytosis of 13,000.  CMP with bilirubin of 1.3 otherwise unremarkable.  Lipase unremarkable.  Urinalysis with trace occult blood.  CT the abdomen pelvis demonstrates small bowel obstruction secondary to adhesions with transition point in the right lower abdominal region.  Patient admitted to Hospital Medicine for treatment management.  Will maintain patient NPO, pain medication, antiemetics, IV fluids, and consult General surgery in a.m..      Overview/Hospital Course:  No notes on file    Interval History:  Notes reviewed, no acute events since admission.  Patient states his abdominal pain, nausea vomiting have improved since admission with medications.  Patient denies flatulence or bowel movement.  Advised patient will start IV Toradol to help limit use of opiates and patient verbalized understanding.  Awaiting general surgery consult.  Will continue to monitor closely.    Review of Systems   Constitutional:  Positive  for appetite change. Negative for activity change, chills, diaphoresis, fatigue and fever.   HENT:  Negative for congestion, nosebleeds and tinnitus.    Eyes:  Negative for photophobia and visual disturbance.   Respiratory:  Negative for cough, chest tightness, shortness of breath and wheezing.    Cardiovascular:  Negative for chest pain, palpitations and leg swelling.   Gastrointestinal:  Positive for abdominal distention, abdominal pain and constipation. Negative for diarrhea, nausea and vomiting.   Endocrine: Negative for cold intolerance and heat intolerance.   Genitourinary:  Negative for difficulty urinating, dysuria, frequency, hematuria and urgency.   Musculoskeletal:  Negative for arthralgias, back pain and myalgias.   Skin:  Negative for pallor, rash and wound.   Allergic/Immunologic: Negative for immunocompromised state.   Neurological:  Negative for dizziness, tremors, facial asymmetry, speech difficulty and weakness.   Hematological:  Negative for adenopathy. Does not bruise/bleed easily.   Psychiatric/Behavioral:  Negative for confusion and sleep disturbance. The patient is not nervous/anxious.    All other systems reviewed and are negative.  Objective:     Vital Signs (Most Recent):  Temp: 96.5 °F (35.8 °C) (01/05/23 1507)  Pulse: 67 (01/05/23 1556)  Resp: 19 (01/05/23 1556)  BP: (!) 146/90 (01/05/23 1507)  SpO2: 96 % (01/05/23 1556)   Vital Signs (24h Range):  Temp:  [96.5 °F (35.8 °C)-98.5 °F (36.9 °C)] 96.5 °F (35.8 °C)  Pulse:  [52-89] 67  Resp:  [16-20] 19  SpO2:  [93 %-99 %] 96 %  BP: (117-146)/(71-92) 146/90     Weight: 75 kg (165 lb 5.5 oz)  Body mass index is 23.06 kg/m².  No intake or output data in the 24 hours ending 01/05/23 1704   Physical Exam  Vitals and nursing note reviewed.   Constitutional:       General: He is not in acute distress.     Appearance: Normal appearance. He is well-developed and normal weight. He is not ill-appearing or diaphoretic.   HENT:      Head: Normocephalic  and atraumatic.      Right Ear: External ear normal.      Left Ear: External ear normal.      Nose: Nose normal. No congestion or rhinorrhea.      Mouth/Throat:      Mouth: Mucous membranes are dry.      Pharynx: Oropharynx is clear. No oropharyngeal exudate or posterior oropharyngeal erythema.   Eyes:      General: No scleral icterus.     Conjunctiva/sclera: Conjunctivae normal.      Pupils: Pupils are equal, round, and reactive to light.   Neck:      Vascular: No JVD.   Cardiovascular:      Rate and Rhythm: Normal rate and regular rhythm.      Pulses: Normal pulses.      Heart sounds: Normal heart sounds. No murmur heard.    No friction rub. No gallop.   Pulmonary:      Effort: Pulmonary effort is normal. No respiratory distress.      Breath sounds: Normal breath sounds. No stridor. No wheezing, rhonchi or rales.   Abdominal:      General: Bowel sounds are normal. There is no distension.      Palpations: Abdomen is soft.      Tenderness: There is abdominal tenderness. There is no guarding or rebound.      Comments: Tenderness in lower abdominal region   Musculoskeletal:         General: No swelling or tenderness. Normal range of motion.      Cervical back: Normal range of motion and neck supple.   Lymphadenopathy:      Cervical: No cervical adenopathy.   Skin:     General: Skin is warm and dry.      Capillary Refill: Capillary refill takes less than 2 seconds.      Coloration: Skin is not jaundiced or pale.      Findings: No erythema or rash.   Neurological:      General: No focal deficit present.      Mental Status: He is alert and oriented to person, place, and time.      Cranial Nerves: No cranial nerve deficit.      Sensory: No sensory deficit.      Coordination: Coordination normal.      Deep Tendon Reflexes: Reflexes normal.   Psychiatric:         Mood and Affect: Mood normal.         Behavior: Behavior normal.         Thought Content: Thought content normal.         Judgment: Judgment normal.        Significant Labs: All pertinent labs within the past 24 hours have been reviewed.  CBC:   Recent Labs   Lab 01/05/23  0029 01/05/23 0528   WBC 12.87* 10.98   HGB 16.0 15.1   HCT 45.9 44.0    261     CMP:   Recent Labs   Lab 01/05/23  0101 01/05/23 0528    139   K 4.0 4.1    102   CO2 23 24   * 115*   BUN 14 12   CREATININE 0.9 0.8   CALCIUM 10.1 9.9   PROT 8.0 7.5   ALBUMIN 4.5 4.3   BILITOT 1.3* 1.4*   ALKPHOS 64 61   AST 25 23   ALT 25 23   ANIONGAP 13 13       Significant Imaging: I have reviewed all pertinent imaging results/findings within the past 24 hours.      Assessment/Plan:      * Small bowel obstruction  CT scan reviewed  NPO  IV fluids   Pain medication p.r.n.  Antiemetics p.r.n.   General surgery consult pending        Tobacco use disorder, severe, dependence  Chronic problem   Dangers of cigarette smoking were reviewed with patient in detail for 10 minutes and patient was encouraged to quit. Nicotine replacement options were discussed.      Coronary artery disease involving native heart  Chronic problem, stable  Continuous cardiac monitor   Cont home meds when able to take po        Kidney mass  CT reviewed and d/w pt  Pt understands he will need close outpt follow up          VTE Risk Mitigation (From admission, onward)         Ordered     Place SHERIE hose  Until discontinued         01/05/23 0514     IP VTE LOW RISK PATIENT  Once         01/05/23 0514     Place sequential compression device  Until discontinued         01/05/23 0514                Discharge Planning   MILDRED: 1/6/2023     Code Status: Full Code   Is the patient medically ready for discharge?:     Reason for patient still in hospital (select all that apply): Treatment and Consult recommendations  Discharge Plan A: Home                  Ladan Seay NP  Department of Hospital Medicine   [facility]

## 2023-01-05 NOTE — ED PROVIDER NOTES
"Encounter Date: 1/4/2023    SCRIBE #1 NOTE: I, Nicolasa Nelson, am scribing for, and in the presence of,  Melchor Galo MD.     History     Chief Complaint   Patient presents with    Abdominal Pain     "Severe ABD pain"; "cant sleep"     Time seen by provider: 12:49 AM on 01/05/2023    Anatoly Hatch III is a 57 y.o. male who presents to the ED with "cramping" abdominal pain, constipation and vomiting that began around 3 pm yesterday and progressively worsened. The pt reports he began vomiting shortly after he ate dinner. The pt reports he is a smoker. He also mentions surgery for perforated small intestine on 02/25/2018. The patient denies diarrhea, fever, chest pain, SOB or any other symptoms at this time. PMHx of abdominal abscess, appendicitis and  NSTEMI. PSHx of appendectomy, surgery of the small intestine and angiogram with left heart catheterization.     The history is provided by the patient.   Review of patient's allergies indicates:  No Known Allergies  Past Medical History:   Diagnosis Date    Abdominal abscess 09/21/2017    Appendicitis 09/21/2017    Coronary artery disease     Hepatitis C virus infection cured after antiviral drug therapy     treated / cured (SVR12 - 11/2022)    NSTEMI (non-ST elevated myocardial infarction) 08/04/2021    Unspecified viral hepatitis C without hepatic coma      Past Surgical History:   Procedure Laterality Date    ANGIOGRAM, CORONARY, WITH LEFT HEART CATHETERIZATION N/A 8/5/2021    Procedure: Angiogram, Coronary, with Left Heart Cath;  Surgeon: Erasmo Slaughter MD;  [facility] CATH/EP LAB;  Service: Cardiology;  Laterality: N/A;    APPENDECTOMY  09/21/2017    done at [facility] by Dr. Vasquez- Gama    SMALL INTESTINE SURGERY  02/25/2018    SB Resection, drng abscess - Dr. Mar - [facility]     History reviewed. No pertinent family history.  Social History     Tobacco Use    Smoking status: Every Day     Packs/day: 1.00     Years: 30.00     Pack years: 30.00     Types: " Cigarettes    Smokeless tobacco: Never   Substance Use Topics    Alcohol use: Yes     Comment: 4 glasses of whiskey daily    Drug use: No     Review of Systems   Constitutional:  Negative for fever.   Respiratory:  Negative for shortness of breath.    Cardiovascular:  Negative for chest pain.   Gastrointestinal:  Positive for abdominal pain (Cramping), constipation and vomiting. Negative for blood in stool.     Physical Exam     Initial Vitals   BP Pulse Resp Temp SpO2   01/04/23 2237 01/04/23 2237 01/04/23 2237 01/04/23 2235 01/04/23 2237   (!) 145/85 89 20 97.8 °F (36.6 °C) 99 %      MAP       --                Physical Exam    Nursing note and vitals reviewed.  Constitutional: He appears well-developed and well-nourished. He is not diaphoretic. No distress.   HENT:   Head: Normocephalic and atraumatic.   Eyes: EOM are normal. Pupils are equal, round, and reactive to light.   Neck: Neck supple.   Normal range of motion.  Cardiovascular:  Normal rate, regular rhythm, normal heart sounds and intact distal pulses.     Exam reveals no gallop and no friction rub.       No murmur heard.  Pulmonary/Chest: Breath sounds normal. No respiratory distress. He has no wheezes. He has no rhonchi. He has no rales.   Abdominal: Abdomen is soft. Bowel sounds are normal. There is abdominal tenderness (Diffuse). There is no rebound and no guarding.   Musculoskeletal:         General: Normal range of motion.      Cervical back: Normal range of motion and neck supple.     Neurological: He is alert and oriented to person, place, and time.   Skin: Skin is warm.   Psychiatric: He has a normal mood and affect. His behavior is normal. Judgment and thought content normal.       ED Course   Procedures  Labs Reviewed   CBC W/ AUTO DIFFERENTIAL - Abnormal; Notable for the following components:       Result Value    WBC 12.87 (*)     MCH 32.1 (*)     Gran # (ANC) 8.3 (*)     All other components within normal limits   COMPREHENSIVE METABOLIC  PANEL - Abnormal; Notable for the following components:    Glucose 122 (*)     Total Bilirubin 1.3 (*)     All other components within normal limits    Narrative:     Recoll. [id number] by [facility] at 01/05/2023 01:01, reason: Specimen   hemolyzed   URINALYSIS, REFLEX TO URINE CULTURE - Abnormal; Notable for the following components:    Protein, UA Trace (*)     Ketones, UA 1+ (*)     Bilirubin (UA) 1+ (*)     Occult Blood UA Trace (*)     All other components within normal limits    Narrative:     Specimen Source->Urine   COMPREHENSIVE METABOLIC PANEL - Abnormal; Notable for the following components:    Glucose 115 (*)     Total Bilirubin 1.4 (*)     All other components within normal limits   CBC W/ AUTO DIFFERENTIAL - Abnormal; Notable for the following components:    MCH 31.7 (*)     All other components within normal limits   LIPASE    Narrative:     Recoll. [id number] by [facility] at 01/05/2023 01:01, reason: Specimen   hemolyzed   MAGNESIUM          Imaging Results              CT Abdomen Pelvis With Contrast (Final result)  Result time 01/05/23 08:13:30      Final result by Yamini Blanco MD (01/05/23 08:13:30)                   Impression:      There are CT findings of small bowel obstruction possibly due to adhesions.  There are postsurgical changes of it a loop of small bowel within the inferior abdomen.    The patient's cystic lesion of the left kidney continues to slowly enlarge from what is been seen dating back to 2018.  Malignancy cannot be excluded.      Electronically signed by: Yamini Blanco MD  Date:    01/05/2023  Time:    08:13               Narrative:    EXAMINATION:  CT ABDOMEN PELVIS WITH CONTRAST    CLINICAL HISTORY:  Epigastric pain;    TECHNIQUE:  Low dose axial images, sagittal and coronal reformations were obtained from the lung bases to the pubic symphysis following the IV administration of 100 mL of Omnipaque 350 and the oral administration of 30 mL of Omnipaque 350.    COMPARISON:  MRIs  dating back to 2018    FINDINGS:  The liver, spleen, adrenal glands, right kidney and pancreas are unremarkable.  There is a 3.5 cm multi septated lesion with Hounsfield units of 43.  This lesion is similar to what is been seen on prior cross-sectional imaging.  On MRI from 07/01/2022 it measured 2.9 cm.  On MRI from 2018 it measured 2.1 cm.    Dilated loops of small bowel are seen within the inferior abdomen there are postsurgical changes of a loop of small bowel within the inferior abdomen which shows fecalization of material within this loop.    The gallbladder is in place.  There is no evidence intra-abdominal fluid collection.  There is atherosclerotic disease of the aorta.  The osseous structures show degenerative change.  There is a small amount of intra-abdominal free fluid.                                       RADIOLOGY REPORT (Final result)  Result time 01/06/23 15:43:06      Final result by Unknown User (01/06/23 15:43:06)                                         Medications   ketorolac injection 30 mg (30 mg Intravenous Given 1/6/23 0800)   lactated ringers bolus 1,000 mL (0 mLs Intravenous Stopped 1/5/23 0315)   ondansetron injection 4 mg (4 mg Intravenous Given 1/5/23 0021)   morphine injection 6 mg (6 mg Intravenous Given 1/5/23 0021)   iohexoL (OMNIPAQUE 350) 350 mg iodine/mL injection (75 mLs  Given 1/5/23 0151)   morphine injection 4 mg (4 mg Intravenous Given 1/5/23 0457)   0.9%  NaCl infusion ( Intravenous New Bag 1/5/23 0651)     Medical Decision Making:   History:   Old Medical Records: I decided to obtain old medical records.  Initial Assessment:   57-year-old male presents with abdominal pain and vomiting.  Differential Diagnosis:   Initial differential diagnosis included but not limited to enteritis, bowel obstruction, and viral illness.  Clinical Tests:   Lab Tests: Reviewed and Ordered  Radiological Study: Ordered and Reviewed  ED Management:  The patient was emergently evaluated in the  emergency department, his evaluation was significant for middle-age male with abdominal tenderness.  The patient's labs were significant for mildly elevated white blood cell count.  The patient was treated with IV fluids, IV morphine, and IV Zofran.  The patient's CT scan was significant for a bowel obstruction per virtual Radiology.  I will admit the patient to the hospitalist service for further care.  The case was discussed with the APC on call for the hospitalist service.  He has accepted the patient for admission.  Other:   I have discussed this case with another health care provider.        Scribe Attestation:   Scribe #1: I performed the above scribed service and the documentation accurately describes the services I performed. I attest to the accuracy of the note.      ED Course as of 01/09/23 1216   Th Jan 05, 2023   0712 S/o from dr galo, admitted to medicine, boarding in the ER [EF]      ED Course User Index  [EF] Cirilo Nieves MD             I, Dr. Melchor Galo, personally performed the services described in this documentation. All medical record entries made by the scribe were at my direction and in my presence.  I have reviewed the chart and agree that the record reflects my personal performance and is accurate and complete. Melchor Galo MD.  4:37 AM 01/09/2023      Clinical Impression:   Final diagnoses:  [K56.609] Intestinal obstruction, unspecified cause, unspecified whether partial or complete (Primary)        ED Disposition Condition    Admit                 Melchor Galo MD  01/05/23 0439       Melchor Galo MD  01/09/23 1216

## 2023-01-05 NOTE — DISCHARGE INSTRUCTIONS
Imaging done during your admission showed a cystic lesion of the left kidney that continues to slowly enlarge from what has been seen dating back to 2018.  Malignancy cannot be excluded and you need close follow up with urology to further evaluate this.  Return to ED for any worsening symptoms or concerns.    Discharge Instructions, [facility] Medicine    Thank you for choosing [facility] for your medical care.      You were admitted to the hospital with Small bowel obstruction.     Please note your discharge instructions, including diet/activity restrictions, follow-up appointments, and medication changes.  If you have any questions about your medical issues, prescriptions, or any other questions, please feel free to contact the [facility] Medicine Dept at [phone] and we will help.    If you are previously with Home health, outpatient PT/OT or under a therapy program, you are cleared to return to those programs.    Please direct all long term medication refills and follow up to your primary care provider, Bryan Brandt MD. Thank you again for letting us take care of your health care needs.    Please note the following discharge instructions per your discharging physician-  Ladan Seay NP    Eat well balanced meals high in fiber Stay hydrated Drink at least 8 glasses of water daily if you have any problems return to nearest emergency room, Follow up with scheduled appointments.

## 2023-01-05 NOTE — H&P
"[facility] Emergency [facility] Medicine  History & Physical    Patient Name: Anatoly Hatch III  MRN: [id number]  Patient Class: OP- Observation  Admission Date: 1/5/2023  Attending Physician: Alejo Faustin MD   Primary Care Provider: Bryan Brandt MD         Patient information was obtained from patient, past medical records and ER records.     Subjective:     Principal Problem:Small bowel obstruction    Chief Complaint:   Chief Complaint   Patient presents with    Abdominal Pain     "Severe ABD pain"; "cant sleep"        HPI: Anatoly Hatch is a 57-year-old male who presents emergency room complaining of abdominal pain.  Reports abdominal pain onset at 3:00 p.m. yesterday.  He describes the abdominal pain is a crampy sensation in lower abdominal region.  No aggravating or alleviating factors.  He denies any fever or chills.  He does endorse some constipation and vomiting associated.  Previous medical history includes ETOH abuse, hep C, hyperlipidemia, active smoker, coronary artery disease.  Surgical history includes appendectomy, and exploratory laparotomy with bowel resection in 2 areas of small bowel in 2018 at [facility].  ER workup:  CBC with leukocytosis of 13,000.  CMP with bilirubin of 1.3 otherwise unremarkable.  Lipase unremarkable.  Urinalysis with trace occult blood.  CT the abdomen pelvis demonstrates small bowel obstruction secondary to adhesions with transition point in the right lower abdominal region.  Patient admitted to Hospital Medicine for treatment management.  Will maintain patient NPO, pain medication, antiemetics, IV fluids, and consult General surgery in a.m..      Past Medical History:   Diagnosis Date    Abdominal abscess 09/21/2017    Appendicitis 09/21/2017    Hepatitis C virus infection cured after antiviral drug therapy     treated / cured (SVR12 - 11/2022)    NSTEMI (non-ST elevated myocardial infarction) 08/04/2021       Past Surgical History: "   Procedure Laterality Date    ANGIOGRAM, CORONARY, WITH LEFT HEART CATHETERIZATION N/A 8/5/2021    Procedure: Angiogram, Coronary, with Left Heart Cath;  Surgeon: Erasmo Slaughter MD;  Location: [facility] CATH/EP LAB;  Service: Cardiology;  Laterality: N/A;    APPENDECTOMY  09/21/2017    done at [facility] by Dr. Zuleima Malloy    SMALL INTESTINE SURGERY  02/25/2018    SB Resection, drjosephine abscess - Dr. Mar - [facility]       Review of patient's allergies indicates:  No Known Allergies    No current facility-administered medications on file prior to encounter.     Current Outpatient Medications on File Prior to Encounter   Medication Sig    aspirin (ECOTRIN) 81 MG EC tablet Take 1 tablet (81 mg total) by mouth once daily.    clopidogreL (PLAVIX) 75 mg tablet Take 1 tablet (75 mg total) by mouth once daily.    isosorbide mononitrate (IMDUR) 30 MG 24 hr tablet Take 1 tablet (30 mg total) by mouth once daily.    metoprolol succinate (TOPROL-XL) 25 MG 24 hr tablet Take 1 tablet (25 mg total) by mouth once daily.    rosuvastatin (CRESTOR) 10 MG tablet Take 1 tablet (10 mg total) by mouth once daily. Take instead of 40mg dose while on Epclusa     Family History    None       Tobacco Use    Smoking status: Every Day     Packs/day: 1.00     Years: 30.00     Pack years: 30.00     Types: Cigarettes    Smokeless tobacco: Never   Substance and Sexual Activity    Alcohol use: Yes     Comment: 4 glasses of whiskey daily    Drug use: No    Sexual activity: Not on file     Review of Systems   Constitutional:  Positive for activity change and appetite change. Negative for chills, diaphoresis and fever.   HENT:  Negative for congestion, nosebleeds and tinnitus.    Eyes:  Negative for photophobia and visual disturbance.   Respiratory:  Negative for cough, chest tightness, shortness of breath and wheezing.    Cardiovascular:  Negative for chest pain, palpitations and leg swelling.   Gastrointestinal:  Positive for abdominal distention,  abdominal pain, constipation, nausea and vomiting. Negative for diarrhea.   Endocrine: Negative for cold intolerance and heat intolerance.   Genitourinary:  Negative for difficulty urinating, dysuria, frequency, hematuria and urgency.   Musculoskeletal:  Negative for arthralgias, back pain and myalgias.   Skin:  Negative for pallor, rash and wound.   Allergic/Immunologic: Negative for immunocompromised state.   Neurological:  Negative for dizziness, tremors, facial asymmetry, speech difficulty and weakness.   Hematological:  Negative for adenopathy. Does not bruise/bleed easily.   Psychiatric/Behavioral:  Negative for confusion and sleep disturbance. The patient is not nervous/anxious.    Objective:     Vital Signs (Most Recent):  Temp: 97.8 °F (36.6 °C) (01/04/23 2235)  Pulse: 70 (01/05/23 0332)  Resp: 16 (01/05/23 0457)  BP: 125/83 (01/05/23 0332)  SpO2: (!) 94 % (01/05/23 0332)   Vital Signs (24h Range):  Temp:  [97.8 °F (36.6 °C)] 97.8 °F (36.6 °C)  Pulse:  [70-89] 70  Resp:  [16-20] 16  SpO2:  [93 %-99 %] 94 %  BP: (125-145)/(83-92) 125/83     Weight: 74.8 kg (165 lb)  Body mass index is 23.01 kg/m².    Physical Exam  Vitals and nursing note reviewed.   Constitutional:       General: He is not in acute distress.     Appearance: He is well-developed and normal weight. He is ill-appearing. He is not diaphoretic.   HENT:      Head: Normocephalic.      Mouth/Throat:      Mouth: Mucous membranes are dry.   Eyes:      General: No scleral icterus.     Conjunctiva/sclera: Conjunctivae normal.      Pupils: Pupils are equal, round, and reactive to light.   Neck:      Vascular: No JVD.   Cardiovascular:      Rate and Rhythm: Normal rate and regular rhythm.      Heart sounds: Normal heart sounds. No murmur heard.    No friction rub. No gallop.   Pulmonary:      Effort: Pulmonary effort is normal. No respiratory distress.      Breath sounds: Wheezing present. No rales.      Comments: Mild scattered expiratory  wheezing  Abdominal:      General: Bowel sounds are normal. There is no distension.      Palpations: Abdomen is soft.      Tenderness: There is abdominal tenderness. There is no guarding or rebound.      Comments: Tenderness in lower abdominal region   Musculoskeletal:         General: No tenderness. Normal range of motion.      Cervical back: Normal range of motion and neck supple.   Lymphadenopathy:      Cervical: No cervical adenopathy.   Skin:     General: Skin is warm and dry.      Capillary Refill: Capillary refill takes less than 2 seconds.      Coloration: Skin is not pale.      Findings: No erythema or rash.   Neurological:      Mental Status: He is alert and oriented to person, place, and time.      Cranial Nerves: No cranial nerve deficit.      Sensory: No sensory deficit.      Coordination: Coordination normal.      Deep Tendon Reflexes: Reflexes normal.   Psychiatric:         Mood and Affect: Mood normal.         Behavior: Behavior normal.         Thought Content: Thought content normal.         Judgment: Judgment normal.         CRANIAL NERVES     CN III, IV, VI   Pupils are equal, round, and reactive to light.     Significant Labs: All pertinent labs within the past 24 hours have been reviewed.  CBC:   Recent Labs   Lab 01/05/23  0029   WBC 12.87*   HGB 16.0   HCT 45.9        CMP:   Recent Labs   Lab 01/05/23  0101      K 4.0      CO2 23   *   BUN 14   CREATININE 0.9   CALCIUM 10.1   PROT 8.0   ALBUMIN 4.5   BILITOT 1.3*   ALKPHOS 64   AST 25   ALT 25   ANIONGAP 13     Urine Studies:   Recent Labs   Lab 01/05/23  0020   COLORU Yellow   APPEARANCEUA Clear   PHUR 6.0   SPECGRAV 1.025   PROTEINUA Trace*   GLUCUA Negative   KETONESU 1+*   BILIRUBINUA 1+*   OCCULTUA Trace*   NITRITE Negative   UROBILINOGEN 1.0   LEUKOCYTESUR Negative       Significant Imaging: I have reviewed all pertinent imaging results/findings within the past 24 hours.    CT abdomen pelvis:      FINDINGS:  Liver: Unremarkable. No mass.  Gallbladder and bile ducts: Normal. No calcified stones. No ductal dilation.  Pancreas: Normal. No ductal dilation.  Spleen: Normal. No splenomegaly.  Adrenal glands: Normal. No mass.  Kidneys and ureters: Complex cystic mass lower pole left kidney previously measured 2.7 x 2.9 x  5.2 cm craniocaudad and now measures 3.3 x 3.3 x 5.4 cm craniocaudad; the complex cystic  component which previously measured about 2.0 x 2.9 cm axial now measures about 2.3 x 2.8 cm  axial.  Stomach and bowel: There are multiple loops of distended small bowel out of proportion relative to  the descending colon with multiple scattered air-fluid levels. There are multiple colonic diverticuli.  Small bowel transition zone located in the right lower quadrant just below the pelvic inlet. Negative for  bowel pneumatosis, mesenteric/portal gas. Postsurgical changes of small bowel.  Appendix: No evidence of appendicitis.  Intraperitoneal space: Trace amount perihepatic ascites.  Vasculature: Atherosclerotic vascular calcifications are noted involving the aorta.  Lymph nodes: Unremarkable. No enlarged lymph nodes.  Urinary bladder: Unremarkable as visualized.  Reproductive: Unremarkable as visualized.  Bones/joints: Mesenteric fluid and edema are noted at multiple levels. One or more vertebral body  endplate Schmorl nodes are noted at one or more levels.  Soft tissues: Unremarkable.  IMPRESSION:  1. Small bowel obstruction likely secondary to adhesion with transition zone in the right lower  abdomen just below the pelvic inlet. Negative for bowel pneumatosis, portal/mesenteric gas.  2. Complex cyst left kidney with marginal simple appearing cyst appears slightly enlarged over all  however this likely accounts for the simple portion of the mass as the complex portion of the mass is  probably stable.    Assessment/Plan:     * Small bowel obstruction  Acute problem  NPO  IV fluids   Pain medication  p.r.n.  Antiemetics p.r.n.   Appreciate recommendations from General surgery         Tobacco use disorder, severe, dependence  Chronic problem   Dangers of cigarette smoking were reviewed with patient in detail for 10 minutes and patient was encouraged to quit. Nicotine replacement options were discussed.      Coronary artery disease involving native heart  Chronic problem  Continuous cardiac monitor   Stable      Kidney mass  Chronic problem           VTE Risk Mitigation (From admission, onward)         Ordered     Place SHERIE hose  Until discontinued         01/05/23 0514     IP VTE LOW RISK PATIENT  Once         01/05/23 0514     Place sequential compression device  Until discontinued         01/05/23 0514                   Rocky Finch NP  Department of Hospital Medicine   [facility] - Emergency Dept

## 2023-01-05 NOTE — FIRST PROVIDER EVALUATION
" Emergency Department TeleTriage Encounter Note      CHIEF COMPLAINT    Chief Complaint   Patient presents with    Abdominal Pain     "Severe ABD pain"; "cant sleep"       VITAL SIGNS   Initial Vitals   BP Pulse Resp Temp SpO2   01/04/23 2237 01/04/23 2237 01/04/23 2237 01/04/23 2235 01/04/23 2237   (!) 145/85 89 20 97.8 °F (36.6 °C) 99 %      MAP       --                   ALLERGIES    Review of patient's allergies indicates:  No Known Allergies    PROVIDER TRIAGE NOTE  57-year-old Male presents with abdominal pain.  History of ruptured appendix, states this feels similar.  Endorses nausea and vomiting.  Appears uncomfortable.      ORDERS  Labs Reviewed   CBC W/ AUTO DIFFERENTIAL   COMPREHENSIVE METABOLIC PANEL   LIPASE   URINALYSIS, REFLEX TO URINE CULTURE       ED Orders (720h ago, onward)      Start Ordered     Status Ordering Provider    01/04/23 2300 01/04/23 2255  lactated ringers bolus 1,000 mL  ED 1 Time         Ordered BETSEY AARON    01/04/23 2300 01/04/23 2255  ondansetron injection 4 mg  ED 1 Time         Ordered BETSEY AARON    01/04/23 2300 01/04/23 2255  morphine injection 6 mg  ED 1 Time         Ordered BETSEY AARON    01/04/23 2256 01/04/23 2255  Saline lock IV  Once         Ordered BETSEY AARON    01/04/23 2256 01/04/23 2255  CBC Auto Differential  STAT         Ordered BETSEY AARON    01/04/23 2256 01/04/23 2255  Comprehensive Metabolic Panel  STAT         Ordered BETSEY AARON    01/04/23 2256 01/04/23 2255  Lipase  STAT         Ordered BETSEY AARON    01/04/23 2256 01/04/23 2255  Urinalysis, Reflex to Urine Culture  STAT         Ordered BETSEY AARON    01/04/23 2256 01/04/23 2255  CT Abdomen Pelvis With Contrast  1 time imaging         Ordered BETSEY AARON              Virtual Visit Note: The provider triage portion of this emergency department evaluation and documentation was performed via [employer], a HIPAA-compliant telemedicine application, in " concert with a tele-presenter in the room. A face to face patient evaluation with one of my colleagues will occur once the patient is placed in an emergency department room.      DISCLAIMER: This note was prepared with Drive voice recognition transcription software. Garbled syntax, mangled pronouns, and other bizarre constructions may be attributed to that software system.

## 2023-01-05 NOTE — ED NOTES
NG tube placed per Dr. Schulte verbal order. Pt tolerated well. Placed in low intermittent suction. Return of water from ice chips successful

## 2023-01-05 NOTE — PT/OT/SLP PROGRESS
Physical Therapy      Patient Name:  Anatoly Hatch III   MRN:  [id number]    Patient not seen today secondary to Patient unwilling to participate (patient indicated he was independent and did not need inpatient PT).

## 2023-01-05 NOTE — PT/OT/SLP PROGRESS
Occupational Therapy      Patient Name:  Anatoly Hatch III   MRN:  [id number]    Per discussion with patient, patient stated he is able to perform all self care tasks without assistance. Patient further stated he will not require acute OT therapy. Discontinuing OT orders per patient's request.    1/5/2023

## 2023-01-05 NOTE — ASSESSMENT & PLAN NOTE
Acute problem  NPO  IV fluids   Pain medication p.r.n.  Antiemetics p.r.n.   Appreciate recommendations from General surgery

## 2023-01-05 NOTE — ASSESSMENT & PLAN NOTE
CT scan reviewed  NPO  IV fluids   Pain medication p.r.n.  Antiemetics p.r.n.   General surgery consult pending

## 2023-01-05 NOTE — SUBJECTIVE & OBJECTIVE
Interval History:  Notes reviewed, no acute events since admission.  Patient states his abdominal pain, nausea vomiting have improved since admission with medications.  Patient denies flatulence or bowel movement.  Advised patient will start IV Toradol to help limit use of opiates and patient verbalized understanding.  Awaiting general surgery consult.  Will continue to monitor closely.    Review of Systems   Constitutional:  Positive for appetite change. Negative for activity change, chills, diaphoresis, fatigue and fever.   HENT:  Negative for congestion, nosebleeds and tinnitus.    Eyes:  Negative for photophobia and visual disturbance.   Respiratory:  Negative for cough, chest tightness, shortness of breath and wheezing.    Cardiovascular:  Negative for chest pain, palpitations and leg swelling.   Gastrointestinal:  Positive for abdominal distention, abdominal pain and constipation. Negative for diarrhea, nausea and vomiting.   Endocrine: Negative for cold intolerance and heat intolerance.   Genitourinary:  Negative for difficulty urinating, dysuria, frequency, hematuria and urgency.   Musculoskeletal:  Negative for arthralgias, back pain and myalgias.   Skin:  Negative for pallor, rash and wound.   Allergic/Immunologic: Negative for immunocompromised state.   Neurological:  Negative for dizziness, tremors, facial asymmetry, speech difficulty and weakness.   Hematological:  Negative for adenopathy. Does not bruise/bleed easily.   Psychiatric/Behavioral:  Negative for confusion and sleep disturbance. The patient is not nervous/anxious.    All other systems reviewed and are negative.  Objective:     Vital Signs (Most Recent):  Temp: 96.5 °F (35.8 °C) (01/05/23 1507)  Pulse: 67 (01/05/23 1556)  Resp: 19 (01/05/23 1556)  BP: (!) 146/90 (01/05/23 1507)  SpO2: 96 % (01/05/23 1556)   Vital Signs (24h Range):  Temp:  [96.5 °F (35.8 °C)-98.5 °F (36.9 °C)] 96.5 °F (35.8 °C)  Pulse:  [52-89] 67  Resp:  [16-20] 19  SpO2:   [93 %-99 %] 96 %  BP: (117-146)/(71-92) 146/90     Weight: 75 kg (165 lb 5.5 oz)  Body mass index is 23.06 kg/m².  No intake or output data in the 24 hours ending 01/05/23 1704   Physical Exam  Vitals and nursing note reviewed.   Constitutional:       General: He is not in acute distress.     Appearance: Normal appearance. He is well-developed and normal weight. He is not ill-appearing or diaphoretic.   HENT:      Head: Normocephalic and atraumatic.      Right Ear: External ear normal.      Left Ear: External ear normal.      Nose: Nose normal. No congestion or rhinorrhea.      Mouth/Throat:      Mouth: Mucous membranes are dry.      Pharynx: Oropharynx is clear. No oropharyngeal exudate or posterior oropharyngeal erythema.   Eyes:      General: No scleral icterus.     Conjunctiva/sclera: Conjunctivae normal.      Pupils: Pupils are equal, round, and reactive to light.   Neck:      Vascular: No JVD.   Cardiovascular:      Rate and Rhythm: Normal rate and regular rhythm.      Pulses: Normal pulses.      Heart sounds: Normal heart sounds. No murmur heard.    No friction rub. No gallop.   Pulmonary:      Effort: Pulmonary effort is normal. No respiratory distress.      Breath sounds: Normal breath sounds. No stridor. No wheezing, rhonchi or rales.   Abdominal:      General: Bowel sounds are normal. There is no distension.      Palpations: Abdomen is soft.      Tenderness: There is abdominal tenderness. There is no guarding or rebound.      Comments: Tenderness in lower abdominal region   Musculoskeletal:         General: No swelling or tenderness. Normal range of motion.      Cervical back: Normal range of motion and neck supple.   Lymphadenopathy:      Cervical: No cervical adenopathy.   Skin:     General: Skin is warm and dry.      Capillary Refill: Capillary refill takes less than 2 seconds.      Coloration: Skin is not jaundiced or pale.      Findings: No erythema or rash.   Neurological:      General: No focal  deficit present.      Mental Status: He is alert and oriented to person, place, and time.      Cranial Nerves: No cranial nerve deficit.      Sensory: No sensory deficit.      Coordination: Coordination normal.      Deep Tendon Reflexes: Reflexes normal.   Psychiatric:         Mood and Affect: Mood normal.         Behavior: Behavior normal.         Thought Content: Thought content normal.         Judgment: Judgment normal.       Significant Labs: All pertinent labs within the past 24 hours have been reviewed.  CBC:   Recent Labs   Lab 01/05/23  0029 01/05/23  0528   WBC 12.87* 10.98   HGB 16.0 15.1   HCT 45.9 44.0    261     CMP:   Recent Labs   Lab 01/05/23  0101 01/05/23  0528    139   K 4.0 4.1    102   CO2 23 24   * 115*   BUN 14 12   CREATININE 0.9 0.8   CALCIUM 10.1 9.9   PROT 8.0 7.5   ALBUMIN 4.5 4.3   BILITOT 1.3* 1.4*   ALKPHOS 64 61   AST 25 23   ALT 25 23   ANIONGAP 13 13       Significant Imaging: I have reviewed all pertinent imaging results/findings within the past 24 hours.

## 2023-01-06 LAB
ALBUMIN SERPL BCP-MCNC: 3.8 G/DL (ref 3.5–5.2)
ALP SERPL-CCNC: 59 U/L (ref 55–135)
ALT SERPL W/O P-5'-P-CCNC: 18 U/L (ref 10–44)
ANION GAP SERPL CALC-SCNC: 11 MMOL/L (ref 8–16)
AST SERPL-CCNC: 17 U/L (ref 10–40)
BASOPHILS # BLD AUTO: 0.06 K/UL (ref 0–0.2)
BASOPHILS NFR BLD: 0.7 % (ref 0–1.9)
BILIRUB SERPL-MCNC: 1.7 MG/DL (ref 0.1–1)
BUN SERPL-MCNC: 10 MG/DL (ref 6–20)
CALCIUM SERPL-MCNC: 9 MG/DL (ref 8.7–10.5)
CHLORIDE SERPL-SCNC: 106 MMOL/L (ref 95–110)
CO2 SERPL-SCNC: 23 MMOL/L (ref 23–29)
CREAT SERPL-MCNC: 0.7 MG/DL (ref 0.5–1.4)
DIFFERENTIAL METHOD: ABNORMAL
EOSINOPHIL # BLD AUTO: 0.2 K/UL (ref 0–0.5)
EOSINOPHIL NFR BLD: 2.6 % (ref 0–8)
ERYTHROCYTE [DISTWIDTH] IN BLOOD BY AUTOMATED COUNT: 12.9 % (ref 11.5–14.5)
EST. GFR  (NO RACE VARIABLE): >60 ML/MIN/1.73 M^2
GLUCOSE SERPL-MCNC: 92 MG/DL (ref 70–110)
HCT VFR BLD AUTO: 42.9 % (ref 40–54)
HGB BLD-MCNC: 14.4 G/DL (ref 14–18)
IMM GRANULOCYTES # BLD AUTO: 0.02 K/UL (ref 0–0.04)
IMM GRANULOCYTES NFR BLD AUTO: 0.2 % (ref 0–0.5)
LYMPHOCYTES # BLD AUTO: 3.7 K/UL (ref 1–4.8)
LYMPHOCYTES NFR BLD: 43.7 % (ref 18–48)
MAGNESIUM SERPL-MCNC: 1.8 MG/DL (ref 1.6–2.6)
MCH RBC QN AUTO: 31.6 PG (ref 27–31)
MCHC RBC AUTO-ENTMCNC: 33.6 G/DL (ref 32–36)
MCV RBC AUTO: 94 FL (ref 82–98)
MONOCYTES # BLD AUTO: 0.8 K/UL (ref 0.3–1)
MONOCYTES NFR BLD: 9.6 % (ref 4–15)
NEUTROPHILS # BLD AUTO: 3.6 K/UL (ref 1.8–7.7)
NEUTROPHILS NFR BLD: 43.2 % (ref 38–73)
NRBC BLD-RTO: 0 /100 WBC
PLATELET # BLD AUTO: 238 K/UL (ref 150–450)
PMV BLD AUTO: 10.2 FL (ref 9.2–12.9)
POTASSIUM SERPL-SCNC: 3.7 MMOL/L (ref 3.5–5.1)
PROT SERPL-MCNC: 6.9 G/DL (ref 6–8.4)
RBC # BLD AUTO: 4.55 M/UL (ref 4.6–6.2)
SODIUM SERPL-SCNC: 140 MMOL/L (ref 136–145)
WBC # BLD AUTO: 8.44 K/UL (ref 3.9–12.7)

## 2023-01-06 PROCEDURE — 63600175 PHARM REV CODE 636 W HCPCS: Performed by: NURSE PRACTITIONER

## 2023-01-06 PROCEDURE — 99900035 HC TECH TIME PER 15 MIN (STAT)

## 2023-01-06 PROCEDURE — 99233 SBSQ HOSP IP/OBS HIGH 50: CPT | Mod: ,,, | Performed by: SURGERY

## 2023-01-06 PROCEDURE — 25000003 PHARM REV CODE 250: Performed by: NURSE PRACTITIONER

## 2023-01-06 PROCEDURE — 99406 BEHAV CHNG SMOKING 3-10 MIN: CPT

## 2023-01-06 PROCEDURE — 85025 COMPLETE CBC W/AUTO DIFF WBC: CPT | Performed by: NURSE PRACTITIONER

## 2023-01-06 PROCEDURE — 36415 COLL VENOUS BLD VENIPUNCTURE: CPT | Performed by: NURSE PRACTITIONER

## 2023-01-06 PROCEDURE — 80053 COMPREHEN METABOLIC PANEL: CPT | Performed by: NURSE PRACTITIONER

## 2023-01-06 PROCEDURE — 99233 PR SUBSEQUENT HOSPITAL CARE,LEVL III: ICD-10-PCS | Mod: ,,, | Performed by: SURGERY

## 2023-01-06 PROCEDURE — 12000002 HC ACUTE/MED SURGE SEMI-PRIVATE ROOM

## 2023-01-06 PROCEDURE — 83735 ASSAY OF MAGNESIUM: CPT | Performed by: NURSE PRACTITIONER

## 2023-01-06 RX ORDER — ATORVASTATIN CALCIUM 40 MG/1
40 TABLET, FILM COATED ORAL DAILY
Status: DISCONTINUED | OUTPATIENT
Start: 2023-01-06 | End: 2023-01-07 | Stop reason: HOSPADM

## 2023-01-06 RX ORDER — ISOSORBIDE MONONITRATE 30 MG/1
30 TABLET, EXTENDED RELEASE ORAL DAILY
Status: DISCONTINUED | OUTPATIENT
Start: 2023-01-06 | End: 2023-01-07 | Stop reason: HOSPADM

## 2023-01-06 RX ORDER — METOPROLOL SUCCINATE 25 MG/1
25 TABLET, EXTENDED RELEASE ORAL DAILY
Status: DISCONTINUED | OUTPATIENT
Start: 2023-01-06 | End: 2023-01-07 | Stop reason: HOSPADM

## 2023-01-06 RX ORDER — CLOPIDOGREL BISULFATE 75 MG/1
75 TABLET ORAL DAILY
Status: DISCONTINUED | OUTPATIENT
Start: 2023-01-06 | End: 2023-01-07 | Stop reason: HOSPADM

## 2023-01-06 RX ORDER — ASPIRIN 81 MG/1
81 TABLET ORAL DAILY
Status: DISCONTINUED | OUTPATIENT
Start: 2023-01-06 | End: 2023-01-07 | Stop reason: HOSPADM

## 2023-01-06 RX ORDER — HYDRALAZINE HYDROCHLORIDE 20 MG/ML
10 INJECTION INTRAMUSCULAR; INTRAVENOUS EVERY 6 HOURS PRN
Status: DISCONTINUED | OUTPATIENT
Start: 2023-01-06 | End: 2023-01-07 | Stop reason: HOSPADM

## 2023-01-06 RX ADMIN — ATORVASTATIN CALCIUM 40 MG: 40 TABLET, FILM COATED ORAL at 03:01

## 2023-01-06 RX ADMIN — CLOPIDOGREL BISULFATE 75 MG: 75 TABLET ORAL at 03:01

## 2023-01-06 RX ADMIN — KETOROLAC TROMETHAMINE 30 MG: 30 INJECTION, SOLUTION INTRAMUSCULAR; INTRAVENOUS at 12:01

## 2023-01-06 RX ADMIN — KETOROLAC TROMETHAMINE 30 MG: 30 INJECTION, SOLUTION INTRAMUSCULAR; INTRAVENOUS at 08:01

## 2023-01-06 RX ADMIN — ASPIRIN 81 MG: 81 TABLET, COATED ORAL at 03:01

## 2023-01-06 RX ADMIN — HYDRALAZINE HYDROCHLORIDE 10 MG: 20 INJECTION, SOLUTION INTRAMUSCULAR; INTRAVENOUS at 08:01

## 2023-01-06 RX ADMIN — ISOSORBIDE MONONITRATE 30 MG: 30 TABLET, EXTENDED RELEASE ORAL at 03:01

## 2023-01-06 RX ADMIN — METOPROLOL SUCCINATE 25 MG: 25 TABLET, EXTENDED RELEASE ORAL at 03:01

## 2023-01-06 NOTE — SUBJECTIVE & OBJECTIVE
Interval History:  Notes reviewed, no acute events since admission.  Patient states his abdominal pain, nausea vomiting have improved since admission with medications.  Patient denies flatulence or bowel movement.  Advised patient will start IV Toradol to help limit use of opiates and patient verbalized understanding.  Awaiting general surgery consult.  Will continue to monitor closely.    Review of Systems   Constitutional:  Negative for activity change, appetite change, chills, diaphoresis, fatigue and fever.   HENT:  Negative for congestion, nosebleeds and tinnitus.    Eyes:  Negative for photophobia and visual disturbance.   Respiratory:  Negative for cough, chest tightness, shortness of breath and wheezing.    Cardiovascular:  Negative for chest pain, palpitations and leg swelling.   Gastrointestinal:  Negative for abdominal distention, abdominal pain, constipation, diarrhea, nausea and vomiting.   Endocrine: Negative for cold intolerance and heat intolerance.   Genitourinary:  Negative for difficulty urinating, dysuria, frequency, hematuria and urgency.   Musculoskeletal:  Negative for arthralgias, back pain and myalgias.   Skin:  Negative for pallor, rash and wound.   Allergic/Immunologic: Negative for immunocompromised state.   Neurological:  Negative for dizziness, tremors, facial asymmetry, speech difficulty and weakness.   Hematological:  Negative for adenopathy. Does not bruise/bleed easily.   Psychiatric/Behavioral:  Negative for confusion and sleep disturbance. The patient is not nervous/anxious.    All other systems reviewed and are negative.  Objective:     Vital Signs (Most Recent):  Temp: 98.4 °F (36.9 °C) (01/06/23 1144)  Pulse: 76 (01/06/23 1144)  Resp: 18 (01/06/23 1144)  BP: (!) 151/90 (01/06/23 1144)  SpO2: 98 % (01/06/23 1144)   Vital Signs (24h Range):  Temp:  [96.5 °F (35.8 °C)-98.7 °F (37.1 °C)] 98.4 °F (36.9 °C)  Pulse:  [66-76] 76  Resp:  [17-20] 18  SpO2:  [94 %-98 %] 98 %  BP:  (139-170)/(85-98) 151/90     Weight: 75 kg (165 lb 5.5 oz)  Body mass index is 23.06 kg/m².    Intake/Output Summary (Last 24 hours) at 1/6/2023 1454  Last data filed at 1/6/2023 0757  Gross per 24 hour   Intake 0 ml   Output 976 ml   Net -976 ml        Physical Exam  Vitals and nursing note reviewed.   Constitutional:       General: He is not in acute distress.     Appearance: Normal appearance. He is well-developed and normal weight. He is not ill-appearing or diaphoretic.   HENT:      Head: Normocephalic and atraumatic.      Right Ear: External ear normal.      Left Ear: External ear normal.      Nose: Nose normal. No congestion or rhinorrhea.      Comments: NGT intact to LIWS     Mouth/Throat:      Mouth: Mucous membranes are dry.      Pharynx: Oropharynx is clear. No oropharyngeal exudate or posterior oropharyngeal erythema.   Eyes:      General: No scleral icterus.     Conjunctiva/sclera: Conjunctivae normal.      Pupils: Pupils are equal, round, and reactive to light.   Neck:      Vascular: No JVD.   Cardiovascular:      Rate and Rhythm: Normal rate and regular rhythm.      Pulses: Normal pulses.      Heart sounds: Normal heart sounds. No murmur heard.    No friction rub. No gallop.   Pulmonary:      Effort: Pulmonary effort is normal. No respiratory distress.      Breath sounds: Normal breath sounds. No stridor. No wheezing, rhonchi or rales.   Abdominal:      General: Bowel sounds are normal. There is no distension.      Palpations: Abdomen is soft.      Tenderness: There is no abdominal tenderness. There is no guarding or rebound.   Musculoskeletal:         General: No swelling or tenderness. Normal range of motion.      Cervical back: Normal range of motion and neck supple.   Lymphadenopathy:      Cervical: No cervical adenopathy.   Skin:     General: Skin is warm and dry.      Capillary Refill: Capillary refill takes less than 2 seconds.      Coloration: Skin is not jaundiced or pale.      Findings: No  erythema or rash.   Neurological:      General: No focal deficit present.      Mental Status: He is alert and oriented to person, place, and time.      Cranial Nerves: No cranial nerve deficit.      Sensory: No sensory deficit.      Coordination: Coordination normal.      Deep Tendon Reflexes: Reflexes normal.   Psychiatric:         Mood and Affect: Mood normal.         Behavior: Behavior normal.         Thought Content: Thought content normal.         Judgment: Judgment normal.       Significant Labs: All pertinent labs within the past 24 hours have been reviewed.  CBC:   Recent Labs   Lab 01/05/23  0029 01/05/23 0528 01/06/23  0359   WBC 12.87* 10.98 8.44   HGB 16.0 15.1 14.4   HCT 45.9 44.0 42.9    261 238       CMP:   Recent Labs   Lab 01/05/23  0101 01/05/23 0528 01/06/23  0359    139 140   K 4.0 4.1 3.7    102 106   CO2 23 24 23   * 115* 92   BUN 14 12 10   CREATININE 0.9 0.8 0.7   CALCIUM 10.1 9.9 9.0   PROT 8.0 7.5 6.9   ALBUMIN 4.5 4.3 3.8   BILITOT 1.3* 1.4* 1.7*   ALKPHOS 64 61 59   AST 25 23 17   ALT 25 23 18   ANIONGAP 13 13 11         Significant Imaging: I have reviewed all pertinent imaging results/findings within the past 24 hours.

## 2023-01-06 NOTE — NURSING
Arrives to room 324 AAO X 4 vs AFEBRILE TELE IN PLACE 16 Syriac NG tub to left nare hooke to low intermittant suction normal saline infusing at 125 ml Hour Patient verbalizes stomach feels better passing gas at this time,

## 2023-01-06 NOTE — CARE UPDATE
01/05/23 2023   Patient Assessment/Suction   Level of Consciousness (AVPU) alert   Respiratory Effort Normal;Unlabored   Expansion/Accessory Muscles/Retractions expansion symmetric;no retractions;no use of accessory muscles   All Lung Fields Breath Sounds clear   PRE-TX-O2   Device (Oxygen Therapy) room air   SpO2 95 %   Pulse Oximetry Type Intermittent   Pulse 73   Resp 18   Aerosol Therapy   $ Aerosol Therapy Charges PRN treatment not required   Respiratory Treatment Status (SVN) PRN treatment not required

## 2023-01-06 NOTE — ASSESSMENT & PLAN NOTE
CT scan reviewed  NPO  IV fluids   Pain medication p.r.n.  Antiemetics p.r.n.   General surgery consult pending    1/6 - abd pain resolved, pt reports passing flatulance and had small BM this morning. Will clamp NGT and start clears. Will d/w gen surgery

## 2023-01-06 NOTE — CARE UPDATE
01/06/23 1023   Tobacco Cessation Intervention   Do you use any type of tobacco product? Yes   Are you interested in quitting use of tobacco products? Thinking about quitting   Are you interested in Nicotine Replacement for symptom relief? No   $ Smoking Cessation Charges Smoking Cessation - Intermediate (CTTS)

## 2023-01-06 NOTE — PLAN OF CARE
Recommendations   1) Advance PO diet to full liquids + Boost plus BID- eventual goal of low fiber diet   2) weigh weekly   3) If unable to tolerate advancement to full liquids in < 5 days start PPN D 5 AA 4.25 @ 85 ml/hr + standard lipids   ( 86 g protein (100% EPN), 1192 kcal ( 62% EEN))    Goals: 1) PO diet advanced to full liquids in < 5 days  Nutrition Goal Status: new  Communication of RD Recs: reviewed with physician (POC, sticky note)

## 2023-01-06 NOTE — CONSULTS
[facility]  Adult Nutrition  Consult Note    SUMMARY     Recommendations   1) Advance PO diet to full liquids + Boost plus BID- eventual goal of low fiber diet   2) weigh weekly   3) If unable to tolerate advancement to full liquids in < 5 days start PPN D 5 AA 4.25 @ 85 ml/hr + standard lipids   ( 86 g protein (100% EPN), 1192 kcal ( 62% EEN))    Goals: 1) PO diet advanced to full liquids in < 5 days  Nutrition Goal Status: new  Communication of RD Recs: reviewed with physician (POC, sticky note)    Assessment and Plan    Inadequate energy intake  R/t NPO, altered GI function  As evidenced by PO intakes < 50% of needs x 2 days  Intervention: collaboration with other providers  new     Malnutrition Assessment     Skin (Micronutrient):  (Iftikhar = 20)   Micronutrient Evaluation: suspected deficiency  Micronutrient Evaluation Comments: check iron/folate/B12               Edema (Fluid Accumulation): 0-->no edema present   Subcutaneous Fat Loss (Final Summary): well nourished  Muscle Loss Evaluation (Final Summary): well nourished         Reason for Assessment    Reason For Assessment: consult  Diagnosis:  (SBO)  Relevant Medical History: ETOH abuse, Hep. C, kidney mass, HLD, smoking, CAD, bowel resection 2018, NSTEMI  Interdisciplinary Rounds: did not attend    General Information Comments: 58 y/o male admitted with SBO s/p 1 day of abd. pain PTA. NG in place, 500 ml output noted. Pt tells me has been passing gas and had a small BM today. MD advanced PO diet to clears, awaiting GI eval fo further diet advancement. Pt is hungry this afternoon, says he typically eats with fair appetite at home-mostly take out as he finds it hard to cook for only gimself. NFPE 1/6/22 no obvious wasting, on the thin side. No significant wt loss per chart review.    Nutrition Discharge Planning: To be determined- Cardiac diet ( low fiber if pt with diet tolerance problems)    Nutrition Risk Screen    Nutrition Risk  "Screen: no indicators present    Nutrition/Diet History    Patient Reported Diet/Restrictions/Preferences: general  Typical Food/Fluid Intake: mostly take out  Spiritual, Cultural Beliefs, Holiness Practices, Values that Affect Care: no  Food Allergies: NKFA  Factors Affecting Nutritional Intake: altered gastrointestinal function, NPO    Anthropometrics    Temp: 98.4 °F (36.9 °C)  Height Method: Measured (office 22)  Height: 5' 11"  Height (inches): 71 in  Weight Method: Bed Scale  Weight: 75 kg (165 lb 5.5 oz)  Weight (lb): 165.35 lb  Ideal Body Weight (IBW), Male: 172 lb  % Ideal Body Weight, Male (lb): 96.13 %  BMI (Calculated): 23.1  BMI Grade: 18.5-24.9 - normal  Usual Body Weight (UBW), k.8 kg (22)  % Usual Body Weight: 104.68  % Weight Change From Usual Weight: 4.46 %       Lab/Procedures/Meds    Pertinent Labs Reviewed: reviewed  BMP  Lab Results   Component Value Date     2023    K 3.7 2023     2023    CO2 23 2023    BUN 10 2023    CREATININE 0.7 2023    CALCIUM 9.0 2023    ANIONGAP 11 2023    EGFRNORACEVR >60 2023     Lab Results   Component Value Date    ALBUMIN 3.8 2023       Pertinent Medications Reviewed: reviewed  Pertinent Medications Comments: NS @ 125 ml/hr, zofran, Gonzalo, Aidan    Estimated/Assessed Needs    Weight Used For Calorie Calculations: 75 kg (165 lb 5.5 oz)  Energy Calorie Requirements (kcal): MSJ ( x 1.2-1.4) = 1677-7498 kcal  Energy Need Method: [address]  Protein Requirements: 0.8-1 g protein/kg ( 60-75 g)  Weight Used For Protein Calculations: 75 kg (165 lb 5.5 oz)  Fluid Requirements (mL): 5615-5741 ml or per MD  Estimated Fluid Requirement Method: RDA Method  CHO Requirement: N/A      Nutrition Prescription Ordered    Current Diet Order: Clear liquid diet    Evaluation of Received Nutrient/Fluid Intake    Energy Calories Required: not meeting needs  Protein Required: not meeting " needs  Fluid Required: exceeds needs  Total Fluid Intake (mL/kg): IVF @ 125 ml/hr  Tolerance: tolerating     Intake/Output Summary (Last 24 hours) at 1/6/2023 1400  Last data filed at 1/6/2023 0757  Gross per 24 hour   Intake 0 ml   Output 976 ml   Net -976 ml       % Intake of Estimated Energy Needs: 0-25%  % Meal Intake: diet just advanced    Nutrition Risk  2 x weekly          Monitor and Evaluation    Food and Nutrient Intake: energy intake, food and beverage intake  Food and Nutrient Adminstration: diet order, enteral and parenteral nutrition administration  Anthropometric Measurements: weight  Biochemical Data, Medical Tests and Procedures: electrolyte and renal panel, gastrointestinal profile, glucose/endocrine profile  Nutrition-Focused Physical Findings: overall appearance       Nutrition Follow-Up    RD Follow-up?: Yes

## 2023-01-06 NOTE — CONSULTS
[facility]  General Surgery  Consult Note    Inpatient consult to General Surgery  Consult performed by: Mike Mar III, MD  Consult ordered by: Rocky Finch NP  Reason for consult: Small bowel obstruction      Subjective:     Chief Complaint/Reason for Admission:  Abdominal pain, small-bowel obstruction    History of Present Illness:  Patient is 57-year-old male admitted with a partial small-bowel obstruction.  He presented with sudden onset of severe mid abdominal pain.  Pain associated with nausea.  CT abdomen and pelvis was consistent with partial small-bowel obstruction.  Etiology seemed to be most likely due to adhesions.  Patient was admitted.  NG tube was placed.  He has had good relief with the NG tube.  He started passing flatus last night.  He has had several bowel movements today.  States he is feeling much better.  He has history of 2 previous abdominal operations.  First operation appendectomy laparoscopic.  Second operation was a laparotomy for bowel obstruction 4 years ago.  Small-bowel resection was performed at that time.   No current facility-administered medications on file prior to encounter.     Current Outpatient Medications on File Prior to Encounter   Medication Sig    aspirin (ECOTRIN) 81 MG EC tablet Take 1 tablet (81 mg total) by mouth once daily.    clopidogreL (PLAVIX) 75 mg tablet Take 1 tablet (75 mg total) by mouth once daily.    isosorbide mononitrate (IMDUR) 30 MG 24 hr tablet Take 1 tablet (30 mg total) by mouth once daily.    metoprolol succinate (TOPROL-XL) 25 MG 24 hr tablet Take 1 tablet (25 mg total) by mouth once daily.    rosuvastatin (CRESTOR) 10 MG tablet Take 1 tablet (10 mg total) by mouth once daily. Take instead of 40mg dose while on Epclusa       Review of patient's allergies indicates:  No Known Allergies    Past Medical History:   Diagnosis Date    Abdominal abscess 09/21/2017    Appendicitis 09/21/2017    Coronary artery disease      Hepatitis C virus infection cured after antiviral drug therapy     treated / cured (SVR12 - 11/2022)    NSTEMI (non-ST elevated myocardial infarction) 08/04/2021    Unspecified viral hepatitis C without hepatic coma      Past Surgical History:   Procedure Laterality Date    ANGIOGRAM, CORONARY, WITH LEFT HEART CATHETERIZATION N/A 8/5/2021    Procedure: Angiogram, Coronary, with Left Heart Cath;  Surgeon: Erasmo Slaughter MD;  Location: [facility] [place] CATH/EP LAB;  Service: Cardiology;  Laterality: N/A;    APPENDECTOMY  09/21/2017    done at [facility] by Dr. Vasquez- Gama    SMALL INTESTINE SURGERY  02/25/2018    SB Resection, drjosephine abscess - Dr. Mar - [facility]     Family History    None       Tobacco Use    Smoking status: Every Day     Packs/day: 1.00     Years: 30.00     Pack years: 30.00     Types: Cigarettes    Smokeless tobacco: Never   Substance and Sexual Activity    Alcohol use: Yes     Comment: 4 glasses of whiskey daily    Drug use: No    Sexual activity: Not on file     Review of Systems   Constitutional:  Negative for appetite change, chills, fever and unexpected weight change.   HENT:  Negative for hearing loss, rhinorrhea, sore throat and voice change.    Eyes:  Negative for photophobia and visual disturbance.   Respiratory:  Negative for cough, choking and shortness of breath.    Cardiovascular:  Negative for chest pain, palpitations and leg swelling.   Gastrointestinal:  Negative for abdominal pain, blood in stool, constipation, diarrhea, nausea and vomiting.   Endocrine: Negative for cold intolerance, heat intolerance, polydipsia and polyuria.   Musculoskeletal:  Negative for arthralgias, back pain, joint swelling and neck stiffness.   Skin:  Negative for color change, pallor and rash.   Neurological:  Negative for dizziness, seizures, syncope and headaches.   Hematological:  Negative for adenopathy. Does not bruise/bleed easily.   Psychiatric/Behavioral:  Negative for agitation, behavioral problems and  confusion.    Objective:     Vital Signs (Most Recent):  Temp: 98.2 °F (36.8 °C) (01/06/23 1543)  Pulse: 73 (01/06/23 1543)  Resp: 17 (01/06/23 1543)  BP: 136/88 (01/06/23 1543)  SpO2: 99 % (01/06/23 1543) Vital Signs (24h Range):  Temp:  [97.9 °F (36.6 °C)-98.7 °F (37.1 °C)] 98.2 °F (36.8 °C)  Pulse:  [66-76] 73  Resp:  [17-20] 17  SpO2:  [94 %-99 %] 99 %  BP: (136-170)/(85-98) 136/88     Weight: 75 kg (165 lb 5.5 oz)  Body mass index is 23.06 kg/m².      Intake/Output Summary (Last 24 hours) at 1/6/2023 1735  Last data filed at 1/6/2023 0757  Gross per 24 hour   Intake 0 ml   Output 976 ml   Net -976 ml       Physical Exam  Constitutional:       General: He is awake. He is not in acute distress.     Appearance: He is not toxic-appearing.   HENT:      Head: Normocephalic and atraumatic.   Pulmonary:      Effort: Pulmonary effort is normal. No tachypnea, bradypnea, accessory muscle usage or respiratory distress.   Abdominal:      General: There is no distension.      Palpations: Abdomen is soft.      Tenderness: There is no abdominal tenderness.      Comments: Soft and nondistended  NGT clamped    Musculoskeletal:      Cervical back: Neck supple.   Skin:     Coloration: Skin is not jaundiced.   Neurological:      Mental Status: He is alert and oriented to person, place, and time.   Psychiatric:         Behavior: Behavior is cooperative.       Significant Labs:  CBC:   Recent Labs   Lab 01/06/23  0359   WBC 8.44   RBC 4.55*   HGB 14.4   HCT 42.9      MCV 94   MCH 31.6*   MCHC 33.6     CMP:   Recent Labs   Lab 01/06/23  0359   GLU 92   CALCIUM 9.0   ALBUMIN 3.8   PROT 6.9      K 3.7   CO2 23      BUN 10   CREATININE 0.7   ALKPHOS 59   ALT 18   AST 17   BILITOT 1.7*       Significant Diagnostics:  I have reviewed all pertinent imaging results/findings within the past 24 hours.    Assessment/Plan:     Active Diagnoses:    Diagnosis Date Noted POA    PRINCIPAL PROBLEM:  Small bowel obstruction  [K56.609] 01/05/2023 Yes    Coronary artery disease involving native heart [I25.10] 09/10/2021 Yes    Tobacco use disorder, severe, dependence [F17.200] 09/10/2021 Yes    Kidney mass [N28.89] 04/06/2021 Yes      Problems Resolved During this Admission:   Patient admitted.  Feeling a lot better.  NG tube has been clamped since this morning.  He is passing flatus.  He has had bowel movements.  Will go ahead and remove NG tube.  Advance diet as tolerated.  Can DC if tolerating diet.     Thank you for your consult.     Mike Mar III, MD  General Surgery  [facility]

## 2023-01-06 NOTE — ASSESSMENT & PLAN NOTE
Chronic problem, stable  Continuous cardiac monitor   Cont home meds when able to take po    1/6 - restart home meds today

## 2023-01-07 VITALS
HEIGHT: 71 IN | SYSTOLIC BLOOD PRESSURE: 120 MMHG | RESPIRATION RATE: 18 BRPM | BODY MASS INDEX: 23.15 KG/M2 | DIASTOLIC BLOOD PRESSURE: 81 MMHG | TEMPERATURE: 99 F | HEART RATE: 63 BPM | WEIGHT: 165.38 LBS | OXYGEN SATURATION: 97 %

## 2023-01-07 LAB
ALBUMIN SERPL BCP-MCNC: 3.5 G/DL (ref 3.5–5.2)
ALP SERPL-CCNC: 53 U/L (ref 55–135)
ALT SERPL W/O P-5'-P-CCNC: 15 U/L (ref 10–44)
ANION GAP SERPL CALC-SCNC: 7 MMOL/L (ref 8–16)
AST SERPL-CCNC: 17 U/L (ref 10–40)
BASOPHILS # BLD AUTO: 0.05 K/UL (ref 0–0.2)
BASOPHILS NFR BLD: 0.4 % (ref 0–1.9)
BILIRUB SERPL-MCNC: 1.8 MG/DL (ref 0.1–1)
BUN SERPL-MCNC: 7 MG/DL (ref 6–20)
CALCIUM SERPL-MCNC: 9 MG/DL (ref 8.7–10.5)
CHLORIDE SERPL-SCNC: 107 MMOL/L (ref 95–110)
CO2 SERPL-SCNC: 24 MMOL/L (ref 23–29)
CREAT SERPL-MCNC: 0.8 MG/DL (ref 0.5–1.4)
DIFFERENTIAL METHOD: ABNORMAL
EOSINOPHIL # BLD AUTO: 0.2 K/UL (ref 0–0.5)
EOSINOPHIL NFR BLD: 1.9 % (ref 0–8)
ERYTHROCYTE [DISTWIDTH] IN BLOOD BY AUTOMATED COUNT: 13.1 % (ref 11.5–14.5)
EST. GFR  (NO RACE VARIABLE): >60 ML/MIN/1.73 M^2
GLUCOSE SERPL-MCNC: 90 MG/DL (ref 70–110)
HCT VFR BLD AUTO: 40.8 % (ref 40–54)
HGB BLD-MCNC: 13.8 G/DL (ref 14–18)
IMM GRANULOCYTES # BLD AUTO: 0.04 K/UL (ref 0–0.04)
IMM GRANULOCYTES NFR BLD AUTO: 0.3 % (ref 0–0.5)
LYMPHOCYTES # BLD AUTO: 4 K/UL (ref 1–4.8)
LYMPHOCYTES NFR BLD: 33.5 % (ref 18–48)
MAGNESIUM SERPL-MCNC: 1.8 MG/DL (ref 1.6–2.6)
MCH RBC QN AUTO: 32 PG (ref 27–31)
MCHC RBC AUTO-ENTMCNC: 33.8 G/DL (ref 32–36)
MCV RBC AUTO: 95 FL (ref 82–98)
MONOCYTES # BLD AUTO: 1 K/UL (ref 0.3–1)
MONOCYTES NFR BLD: 8.5 % (ref 4–15)
NEUTROPHILS # BLD AUTO: 6.7 K/UL (ref 1.8–7.7)
NEUTROPHILS NFR BLD: 55.4 % (ref 38–73)
NRBC BLD-RTO: 0 /100 WBC
PLATELET # BLD AUTO: 218 K/UL (ref 150–450)
PMV BLD AUTO: 10.6 FL (ref 9.2–12.9)
POTASSIUM SERPL-SCNC: 4 MMOL/L (ref 3.5–5.1)
PROT SERPL-MCNC: 6.6 G/DL (ref 6–8.4)
RBC # BLD AUTO: 4.31 M/UL (ref 4.6–6.2)
SODIUM SERPL-SCNC: 138 MMOL/L (ref 136–145)
WBC # BLD AUTO: 12.02 K/UL (ref 3.9–12.7)

## 2023-01-07 PROCEDURE — 36415 COLL VENOUS BLD VENIPUNCTURE: CPT | Performed by: NURSE PRACTITIONER

## 2023-01-07 PROCEDURE — 85025 COMPLETE CBC W/AUTO DIFF WBC: CPT | Performed by: NURSE PRACTITIONER

## 2023-01-07 PROCEDURE — 94761 N-INVAS EAR/PLS OXIMETRY MLT: CPT

## 2023-01-07 PROCEDURE — 83735 ASSAY OF MAGNESIUM: CPT | Performed by: NURSE PRACTITIONER

## 2023-01-07 PROCEDURE — 80053 COMPREHEN METABOLIC PANEL: CPT | Performed by: NURSE PRACTITIONER

## 2023-01-07 PROCEDURE — 99900035 HC TECH TIME PER 15 MIN (STAT)

## 2023-01-07 NOTE — CARE UPDATE
01/06/23 1958   Patient Assessment/Suction   Level of Consciousness (AVPU) alert   Respiratory Effort Normal;Unlabored   Expansion/Accessory Muscles/Retractions expansion symmetric;no retractions;no use of accessory muscles   All Lung Fields Breath Sounds clear   PRE-TX-O2   Device (Oxygen Therapy) room air   SpO2 97 %   Pulse Oximetry Type Intermittent   Pulse 90   Resp 18   Aerosol Therapy   $ Aerosol Therapy Charges PRN treatment not required   Respiratory Treatment Status (SVN) PRN treatment not required

## 2023-01-07 NOTE — PLAN OF CARE
POC discussed with patient, verbalized understanding. IV to right arm remains intact and patent. Pt passing flatus and having bm. No c/o pain this shift. Voiding without difficulty. Turns independently. Safety measures maintained with side rails up and slip resistant socks on, call light in reach, Tolerated regular diet denies pain Large BM noted this AM

## 2023-01-07 NOTE — PLAN OF CARE
The pt is cleared for discharge home from case management and has follow up appts added to his avs.    01/07/23 1004   Final Note   Assessment Type Final Discharge Note   Anticipated Discharge Disposition Home   Hospital Resources/Appts/Education Provided Appointments scheduled and added to AVS

## 2023-01-07 NOTE — DISCHARGE SUMMARY
[facility] Medicine  Discharge Summary      Patient Name: Anatoly Hatch III  MRN: [id number]  LUDIN: [id number]  Patient Class: IP- Inpatient  Admission Date: 1/5/2023  Hospital Length of Stay: 2 days  Discharge Date and Time: 1/7/2023 10:13 AM  Attending Physician: No att. providers found   Discharging Provider: Ladan Seay NP  Primary Care Provider: Bryan Brandt MD    Primary Care Team: Networked reference to record PCT     HPI:   Anatoly Hatch is a 57-year-old male who presents emergency room complaining of abdominal pain.  Reports abdominal pain onset at 3:00 p.m. yesterday.  He describes the abdominal pain is a crampy sensation in lower abdominal region.  No aggravating or alleviating factors.  He denies any fever or chills.  He does endorse some constipation and vomiting associated.  Previous medical history includes ETOH abuse, hep C, hyperlipidemia, active smoker, coronary artery disease.  Surgical history includes appendectomy, and exploratory laparotomy with bowel resection in 2 areas of small bowel in 2018 at [facility].  ER workup:  CBC with leukocytosis of 13,000.  CMP with bilirubin of 1.3 otherwise unremarkable.  Lipase unremarkable.  Urinalysis with trace occult blood.  CT the abdomen pelvis demonstrates small bowel obstruction secondary to adhesions with transition point in the right lower abdominal region.  Patient admitted to Hospital Medicine for treatment management.  Will maintain patient NPO, pain medication, antiemetics, IV fluids, and consult General surgery in a.m..      * No surgery found *      Hospital Course:   Pt was monitor closely during his hospital stay.  General surgery was consulted and recommended NG tube placement and patient tolerated procedure well.  Patient remained NPO overnight with NG tube to low intermittent wall suction.  Patient had return of bowel function on 01/06 and NG tube was clamped and he tolerated clear  liquids.  NG tube was removed and diet was advanced.  Patient's symptoms resolved and he felt well and desired to go home.  Patient was cleared for discharge and verbalized understanding of discharge instructions and return precautions.         Goals of Care Treatment Preferences:  Code Status: Full Code      Consults:   Consults (From admission, onward)        Status Ordering Provider     Inpatient consult to General Surgery  Once        Provider:  (Not yet assigned)    Completed YUAN FERRARI     IP consult to dietary  Once        Provider:  (Not yet assigned)    Completed YUAN FERRARI          No new Assessment & Plan notes have been filed under this hospital service since the last note was generated.  Service: Hospital Medicine    Final Active Diagnoses:    Diagnosis Date Noted POA    PRINCIPAL PROBLEM:  Small bowel obstruction [K56.609] 01/05/2023 Yes    Coronary artery disease involving native heart [I25.10] 09/10/2021 Yes    Tobacco use disorder, severe, dependence [F17.200] 09/10/2021 Yes    Kidney mass [N28.89] 04/06/2021 Yes      Problems Resolved During this Admission:       Discharged Condition: good    Disposition: Home or Self Care    Follow Up:   Follow-up Information     Antoine Estrada MD Follow up in 3 day(s).    Specialty: Urology  Why: further evaluation of kidney mass  Contact information:  [address] [place] [address]   [address]  [place] [address]  [phone]             Bryan Brandt MD Follow up on 1/13/2023.    Specialty: Family Medicine  Why: hospital follow up and discuss kidney mass 10:40 am  Contact information:  [address]  [place] [address]  [phone]                       Patient Instructions:      Diet Cardiac     Notify your health care provider if you experience any of the following:  temperature >100.4     Notify your health care provider if you experience any of the following:  persistent nausea and vomiting or diarrhea     Notify your health care provider if  you experience any of the following:  severe uncontrolled pain     Notify your health care provider if you experience any of the following:  difficulty breathing or increased cough     Notify your health care provider if you experience any of the following:  increased confusion or weakness     Activity as tolerated       Significant Diagnostic Studies: Labs:   CMP   Recent Labs   Lab 01/06/23  0359 01/07/23 0355    138   K 3.7 4.0    107   CO2 23 24   GLU 92 90   BUN 10 7   CREATININE 0.7 0.8   CALCIUM 9.0 9.0   PROT 6.9 6.6   ALBUMIN 3.8 3.5   BILITOT 1.7* 1.8*   ALKPHOS 59 53*   AST 17 17   ALT 18 15   ANIONGAP 11 7*   , CBC   Recent Labs   Lab 01/06/23 0359 01/07/23 0355   WBC 8.44 12.02   HGB 14.4 13.8*   HCT 42.9 40.8    218    and All labs within the past 24 hours have been reviewed    Pending Diagnostic Studies:     None         Medications:  Reconciled Home Medications:      Medication List      CONTINUE taking these medications    aspirin 81 MG EC tablet  Commonly known as: ECOTRIN  Take 1 tablet (81 mg total) by mouth once daily.     clopidogreL 75 mg tablet  Commonly known as: PLAVIX  Take 1 tablet (75 mg total) by mouth once daily.     isosorbide mononitrate 30 MG 24 hr tablet  Commonly known as: IMDUR  Take 1 tablet (30 mg total) by mouth once daily.     metoprolol succinate 25 MG 24 hr tablet  Commonly known as: TOPROL-XL  Take 1 tablet (25 mg total) by mouth once daily.     rosuvastatin 10 MG tablet  Commonly known as: CRESTOR  Take 1 tablet (10 mg total) by mouth once daily. Take instead of 40mg dose while on Epclusa            Indwelling Lines/Drains at time of discharge:   Lines/Drains/Airways     None                 Time spent on the discharge of patient: 36 minutes         Ladan Seay NP  Department of Hospital Medicine  [facility]

## 2023-01-07 NOTE — PLAN OF CARE
POC discussed with patient, verbalized understanding. IV to right arm remains intact and patent. Pt passing flatus and having bm. No c/o pain this shift. Voiding without difficulty. Turns independently. Safety measures maintained with side rails up and slip resistant socks on, call light in reach, pt instructed to call for needs.

## 2023-01-07 NOTE — PLAN OF CARE
Hospital follow up with Dr. Brandt scheduled for 1/13/23 at 10:40 am. AVS updated.    01/07/23 1003   Discharge Assessment   Assessment Type Discharge Planning Reassessment

## 2023-01-07 NOTE — HOSPITAL COURSE
Pt was monitor closely during his hospital stay.  General surgery was consulted and recommended NG tube placement and patient tolerated procedure well.  Patient remained NPO overnight with NG tube to low intermittent wall suction.  Patient had return of bowel function on 01/06 and NG tube was clamped and he tolerated clear liquids.  NG tube was removed and diet was advanced.  Patient's symptoms resolved and he felt well and desired to go home.  Patient was cleared for discharge and verbalized understanding of discharge instructions and return precautions.

## 2023-01-09 ENCOUNTER — TELEPHONE (OUTPATIENT)
Dept: MEDSURG UNIT | Facility: HOSPITAL | Age: 58
End: 2023-01-09
Payer: COMMERCIAL

## 2023-01-09 ENCOUNTER — HOSPITAL ENCOUNTER (OUTPATIENT)
Dept: RADIOLOGY | Facility: HOSPITAL | Age: 58
Discharge: HOME | End: 2023-01-09
Attending: STUDENT IN AN ORGANIZED HEALTH CARE EDUCATION/TRAINING PROGRAM
Payer: COMMERCIAL

## 2023-01-09 ENCOUNTER — TELEPHONE (OUTPATIENT)
Dept: HEPATOLOGY | Facility: CLINIC | Age: 58
End: 2023-01-09
Payer: COMMERCIAL

## 2023-01-09 DIAGNOSIS — N28.89 OTHER SPECIFIED DISORDERS OF KIDNEY AND URETER: ICD-10-CM

## 2023-01-09 PROCEDURE — A9585 GADOBUTROL INJECTION: HCPCS | Performed by: STUDENT IN AN ORGANIZED HEALTH CARE EDUCATION/TRAINING PROGRAM

## 2023-01-09 PROCEDURE — 74183 MRI ABD W/O CNTR FLWD CNTR: CPT | Mod: 26,,, | Performed by: RADIOLOGY

## 2023-01-09 PROCEDURE — 74183 MRI ABDOMEN W WO CONTRAST: ICD-10-PCS | Mod: 26,,, | Performed by: RADIOLOGY

## 2023-01-09 PROCEDURE — 74183 MRI ABD W/O CNTR FLWD CNTR: CPT | Mod: TC

## 2023-01-09 PROCEDURE — 25500020 PHARM REV CODE 255: Performed by: STUDENT IN AN ORGANIZED HEALTH CARE EDUCATION/TRAINING PROGRAM

## 2023-01-09 RX ORDER — GADOBUTROL 604.72 MG/ML
7 INJECTION INTRAVENOUS
Status: COMPLETED | OUTPATIENT
Start: 2023-01-09 | End: 2023-01-09

## 2023-01-09 RX ADMIN — GADOBUTROL 7 ML: 604.72 INJECTION INTRAVENOUS at 03:01

## 2023-01-09 NOTE — TELEPHONE ENCOUNTER
----- Message from Tosha Vyas RN sent at 1/9/2023  9:13 AM CST -----  Regarding: Imaging  Patient had CT of abd done on 1/5/23.  He wants to know if he still needs to complete MRI scheduled 1/10/23.

## 2023-01-09 NOTE — TELEPHONE ENCOUNTER
Although I was going to glance at MRI for his routine HCC screening, it actually was ordered by urology to monitor a renal cyst.  I would prefer they decide if recent CT is sufficient.    Keep upcoming f/u w/ me    thx

## 2023-01-10 ENCOUNTER — PATIENT OUTREACH (OUTPATIENT)
Dept: ADMINISTRATIVE | Facility: CLINIC | Age: 58
End: 2023-01-10
Payer: COMMERCIAL

## 2023-01-10 NOTE — PROGRESS NOTES
C3 nurse spoke with Anatoly Hatch III for a TCC post hospital discharge follow up call. The patient has a scheduled HOSFU appointment with Bryan Brandt MD on 1/13/23 @ 1040.

## 2023-01-17 ENCOUNTER — OFFICE VISIT (OUTPATIENT)
Dept: HEPATOLOGY | Facility: CLINIC | Age: 58
End: 2023-01-17
Payer: COMMERCIAL

## 2023-01-17 DIAGNOSIS — Z86.19 HEPATITIS C VIRUS INFECTION CURED AFTER ANTIVIRAL DRUG THERAPY: ICD-10-CM

## 2023-01-17 DIAGNOSIS — K74.60 HEPATIC CIRRHOSIS, UNSPECIFIED HEPATIC CIRRHOSIS TYPE, UNSPECIFIED WHETHER ASCITES PRESENT: Primary | ICD-10-CM

## 2023-01-17 PROCEDURE — 1111F PR DISCHARGE MEDS RECONCILED W/ CURRENT OUTPATIENT MED LIST: ICD-10-PCS | Mod: CPTII,95,, | Performed by: PHYSICIAN ASSISTANT

## 2023-01-17 PROCEDURE — 1160F RVW MEDS BY RX/DR IN RCRD: CPT | Mod: CPTII,95,, | Performed by: PHYSICIAN ASSISTANT

## 2023-01-17 PROCEDURE — 1160F PR REVIEW ALL MEDS BY PRESCRIBER/CLIN PHARMACIST DOCUMENTED: ICD-10-PCS | Mod: CPTII,95,, | Performed by: PHYSICIAN ASSISTANT

## 2023-01-17 PROCEDURE — 99213 OFFICE O/P EST LOW 20 MIN: CPT | Mod: 95,,, | Performed by: PHYSICIAN ASSISTANT

## 2023-01-17 PROCEDURE — 99213 PR OFFICE/OUTPT VISIT, EST, LEVL III, 20-29 MIN: ICD-10-PCS | Mod: 95,,, | Performed by: PHYSICIAN ASSISTANT

## 2023-01-17 PROCEDURE — 1111F DSCHRG MED/CURRENT MED MERGE: CPT | Mod: CPTII,95,, | Performed by: PHYSICIAN ASSISTANT

## 2023-01-17 PROCEDURE — 1159F PR MEDICATION LIST DOCUMENTED IN MEDICAL RECORD: ICD-10-PCS | Mod: CPTII,95,, | Performed by: PHYSICIAN ASSISTANT

## 2023-01-17 PROCEDURE — 1159F MED LIST DOCD IN RCRD: CPT | Mod: CPTII,95,, | Performed by: PHYSICIAN ASSISTANT

## 2023-01-17 NOTE — Clinical Note
CBC, CMP, INR, AFP, FERRITIN / FE STUDIES w/ U/S due 7/2023. Tell pt that we will cancel u/s if Urology orders another MRI for that time.

## 2023-01-17 NOTE — PROGRESS NOTES
"HEPATOLOGY VIDEO VISIT NOTE - HCV clinic  The patient location is: work  Visit type: audiovisual  (converted to audio due to difficulty hearing)    Each patient to whom he or she provides medical services by telemedicine is:  (1) informed of the relationship between the physician and patient and the respective role of any other health care provider with respect to management of the patient; and (2) notified that he or she may decline to receive medical services by telemedicine and may withdraw from such care at any time.      CHIEF COMPLAINT: HCV Cirrhosis    HISTORY       This is a 57 y.o. White male with advanced fibrosis due to HCV and ongoing alcohol, being seen for f/u.     Interval history:  Completed HCV rx, now cured    Routine cirrhosis labs / imaging  MRI to monitor kidney cyst 1/9/23 - no liver lesions  Labs 1/9/23 - normal AFP,LFT. Stable CBC, INR    Current symptoms of hepatic decompensation:  Ascites:              none  TBili elevation:   none  HE:                    none  EV bleed:           none    Recently hospitalized w/ abd pain, found to have SBO. Doing better now.  Still drinking alcohol. Aware he should quit.    Cirrhosis history:  FibroScan F2-3 / FibroSURE F4 (2022)  Well compensated  No evidence of portal HTN  MELD 1/2023 - 6    Cirrhosis health maintenance:  - HCC screening - Up to date 1/2023  - Varices screening -  EGD not indicated  - HAV immunity - s/p twinrix  - HBV immunity - s/p twinrix    HCV history:  S/p 12 weeks epclusa (SVR12 - 9/2022) - treated / cured  - Treatment naive prior to epclusa  - Genotype 2b    PMH, PSH, SOCIAL HX, FAMILY HX      Reviewed in Epic  Pertinent findings:  FAMILY HX: neg for liver diease  SOCIAL HX: Works installing duct work  Alcohol - ~ 30 yr hx heavy use "I'm an alcoholic"  He knows there are "good things behind AA doors if he wants it"  Drugs - prior IN drug use      ROS: as per HPI      PHYSICAL EXAM:  Friendly White male, in no acute distress; alert " and oriented to person, place and time  LUNGS: Normal respiratory effort.  NEURO/PSYCH: Memory intact. Thought and speech pattern appropriate. Behavior normal. No depression or anxiety noted.      PERTINENT DIAGNOSTIC RESULTS      Lab Results   Component Value Date    WBC 9.62 01/09/2023    HGB 13.1 (L) 01/09/2023     01/09/2023     Lab Results   Component Value Date    INR 1.0 01/09/2023     Lab Results   Component Value Date    AST 27 01/09/2023    ALT 21 01/09/2023    BILITOT 0.7 01/09/2023    ALBUMIN 4.1 01/09/2023    ALKPHOS 52 (L) 01/09/2023    CREATININE 0.8 01/09/2023    BUN 12 01/09/2023     01/09/2023    K 4.0 01/09/2023    AFP 2.3 01/09/2023         ASSESSMENT        57 y.o. White male with:  ADVANCED LIVER FIBROSIS  -- MELD 6  -- HCC screen - up to date 1/2023    HISTORY OF HCV, treated / cured  -- S/p epclusa - SVR12 - 9/2022    ONGOING ALCOHOL ABUSE    ELEVATED FE STUDIES  -- HH DNA analysis: 1 copy C282Y, no H63D or S65C  -- 1/2022 Elevated Fe studies: Ferritin 799, Fe 201, Fe sat 57%    PLAN      D/c alcohol  CBC, CMP, INR, AFP, FERRITIN / FE STUDIES w/ U/S due 7/2023  -- If pt will need contrast imaging at that time for kidney cyst/lesion surveillance U/S can be cancelled  3.    Yearly clinic visit, earlier PRN    Discussed need for lifelong liver monitoring and liver cancer screening  Discussed risk of liver disease progression / liver failure w/ continued alcohol  Discussed HCV is cured, relapse not expected.     __________________________________________________________________    Duration of encounter: 26 min  This includes face-to-face time and non face-to-face time preparing to see the patient (eg, review of tests), obtaining and/or reviewing separately obtained history, documenting clinical information in the electronic or other health record, independently interpreting resultsand communicating results to the patient/family/caregiver, or care coordination.

## 2023-01-18 ENCOUNTER — TELEPHONE (OUTPATIENT)
Dept: HEPATOLOGY | Facility: CLINIC | Age: 58
End: 2023-01-18
Payer: COMMERCIAL

## 2023-01-18 NOTE — TELEPHONE ENCOUNTER
----- Message from Jennifer B. Scheuermann, PA sent at 1/17/2023  1:46 PM CST -----  CBC, CMP, INR, AFP, FERRITIN / FE STUDIES w/ U/S due 7/2023. Tell pt that we will cancel u/s if Urology orders another MRI for that time.

## 2023-01-18 NOTE — TELEPHONE ENCOUNTER
Attempt made to reach patient.  Msg from PA Scheuermann left on his VM and mailed to him.  Testing scheduled 7/11/23; appt reminder notice mailed.

## 2023-01-26 ENCOUNTER — TELEPHONE (OUTPATIENT)
Dept: UROLOGY | Facility: CLINIC | Age: 58
End: 2023-01-26
Payer: COMMERCIAL

## 2023-01-26 NOTE — TELEPHONE ENCOUNTER
----- Message from Ade Liriano MD sent at 1/25/2023  4:02 PM CST -----  Pt cancelled follow up, needs to be rescheduled.

## 2023-01-26 NOTE — TELEPHONE ENCOUNTER
Call placed to make patient an follow up appt and to discuss MRI results, no answer, message left with call back number.

## 2023-01-27 ENCOUNTER — TELEPHONE (OUTPATIENT)
Dept: UROLOGY | Facility: CLINIC | Age: 58
End: 2023-01-27
Payer: COMMERCIAL

## 2023-01-27 NOTE — TELEPHONE ENCOUNTER
----- Message from Aed Liriano MD sent at 1/25/2023  4:02 PM CST -----  Pt cancelled follow up, needs to be rescheduled.

## 2023-03-15 ENCOUNTER — PATIENT MESSAGE (OUTPATIENT)
Dept: ADMINISTRATIVE | Facility: HOSPITAL | Age: 58
End: 2023-03-15
Payer: COMMERCIAL

## 2023-04-11 ENCOUNTER — PATIENT MESSAGE (OUTPATIENT)
Dept: ADMINISTRATIVE | Facility: HOSPITAL | Age: 58
End: 2023-04-11
Payer: COMMERCIAL

## 2023-06-16 DIAGNOSIS — I25.10 CORONARY ARTERY DISEASE INVOLVING NATIVE CORONARY ARTERY OF NATIVE HEART WITHOUT ANGINA PECTORIS: ICD-10-CM

## 2023-06-20 RX ORDER — CLOPIDOGREL BISULFATE 75 MG/1
TABLET ORAL
Qty: 90 TABLET | Refills: 3 | Status: SHIPPED | OUTPATIENT
Start: 2023-06-20 | End: 2023-12-08 | Stop reason: SDUPTHER

## 2023-06-20 RX ORDER — METOPROLOL SUCCINATE 25 MG/1
TABLET, EXTENDED RELEASE ORAL
Qty: 90 TABLET | Refills: 3 | Status: SHIPPED | OUTPATIENT
Start: 2023-06-20 | End: 2023-12-08 | Stop reason: SDUPTHER

## 2023-06-25 NOTE — TELEPHONE ENCOUNTER
Care Due:                  Date            Visit Type   Department     Provider  --------------------------------------------------------------------------------                                EP -                              PRIMARY      [facility]    Bryan Rios  Last Visit: 09-      CARE ([facility])   MEDICINE       N[place]ari                              EP -                              PRIMARY      [facility]    Bryan Rios  Next Visit: 09-      CARE ([facility])   MEDICINE       Naccari                                                            Last  Test          Frequency    Reason                     Performed    Due Date  --------------------------------------------------------------------------------    Lipid Panel.  12 months..  rosuvastatin.............  09-   09-    [employer] Embedded Care Due Messages. Reference number: [id number].   6/25/2023 1:31:43 PM CDT   TAKE 1 TO 2 TABLETS AT BEDTIME AS NEEDED 7/10/17  Yes Matt Marques MD   promethazine (PHENERGAN) 25 MG tablet TAKE 1 TABLET EVERY 8 HOURS AS NEEDED FOR NAUSEA 6/7/17  Yes Matt Marques MD   furosemide (LASIX) 40 MG tablet TAKE 1 TABLET ONE TIME DAILY 5/31/17  Yes Matt Marques MD   DULoxetine (CYMBALTA) 60 MG extended release capsule TAKE 1 CAPSULE BY MOUTH EVERY DAY 3/27/17  Yes Matt Marques MD   fluorometholone (FML) 0.1 % ophthalmic suspension Place 1 drop into both eyes 2 times daily    Yes Historical Provider, MD   MILK THISTLE PO Take 1 capsule by mouth Daily with lunch   Yes Historical Provider, MD   Icosapent Ethyl (VASCEPA) 1 G CAPS capsule Take 2 capsules by mouth 2 times daily 3/14/17  Yes YARELIS Velasquez   Respiratory Therapy Supplies (SPIROMETER) KIT Use TID-QID PRN 3/14/17  Yes YARELIS Velasquez   metoclopramide (REGLAN) 10 MG tablet Take 10 mg by mouth 4 times daily   Yes Historical Provider, MD   magnesium hydroxide (MILK OF MAGNESIA) 400 MG/5ML suspension Take 30 mLs by mouth daily as needed for Constipation 11/23/16  Yes Genevieve Garcia MD   atropine 1 % ophthalmic solution Place 1 drop into both eyes 2 times daily   Yes Historical Provider, MD   Alcohol Swabs (ALCOHOL WIPES) PADS PRN as Needed/Tests up to 8 times daily 8/19/16  Yes YARELIS Vleasquez   Lactobacillus (ACIDOPHILUS) 100 MG CAPS Take 1 capsule by mouth daily  Patient taking differently: Take 1 capsule by mouth Daily with lunch  6/16/16  Yes YARELIS Velasquez   PRODIGY LANCETS 21G MISC Test 5 times a day 5/23/16  Yes Toribio Guerrero MD   Multiple Vitamin (MULTI-VITAMIN DAILY PO) Take 1 tablet by mouth daily    Yes Historical Provider, MD   loperamide (IMODIUM) 2 MG capsule TAKE ONE (1) CAPSULE BY MOUTH FOUR TIMES A DAY AS NEEDED FOR DIARRHEA 3/24/16  Yes Shanique Hernandez MD   Cholecalciferol (VITAMIN D3) 5000 UNITS CAPS TAKE ONE (1) CAPSULE BY MOUTH ONCE DAILY  Patient taking differently: TAKE ONE (1)

## 2023-06-25 NOTE — TELEPHONE ENCOUNTER
Per pharmacy report:  Last ordered: 9 months ago by Bryan Brandt MD Last refill: 6/16/2023     LR--9-13-22        LOV--9-7-22       [facility]--9-8-23

## 2023-06-26 RX ORDER — ROSUVASTATIN CALCIUM 10 MG/1
TABLET, COATED ORAL
Qty: 90 TABLET | Refills: 3 | Status: SHIPPED | OUTPATIENT
Start: 2023-06-26 | End: 2023-12-08 | Stop reason: SDUPTHER

## 2023-07-13 ENCOUNTER — TELEPHONE (OUTPATIENT)
Dept: HEPATOLOGY | Facility: CLINIC | Age: 58
End: 2023-07-13
Payer: COMMERCIAL

## 2023-07-13 NOTE — TELEPHONE ENCOUNTER
I spoke with patient and msg from PA Scheuermann relayed.  He states that he is very busy with work and will call back for scheduling.  Msg from PA Scheuermann mailed to him as a reminder.

## 2023-07-13 NOTE — TELEPHONE ENCOUNTER
Appears pt missed recent labs / u/s  Pls r/s:  CBC, CMP, INR, AFP, FERRITIN / FE STUDIES  U/S  Visit - in person or video    Remind him even though Hep C is cured, he still needs liver monitoring and liver cancer screening  thanks

## 2023-12-08 ENCOUNTER — OFFICE VISIT (OUTPATIENT)
Dept: CARDIOLOGY | Facility: CLINIC | Age: 58
End: 2023-12-08
Payer: COMMERCIAL

## 2023-12-08 VITALS
HEIGHT: 71 IN | DIASTOLIC BLOOD PRESSURE: 70 MMHG | SYSTOLIC BLOOD PRESSURE: 120 MMHG | BODY MASS INDEX: 22.26 KG/M2 | WEIGHT: 159 LBS

## 2023-12-08 DIAGNOSIS — F10.90 CHRONIC ALCOHOL USE: ICD-10-CM

## 2023-12-08 DIAGNOSIS — F17.200 TOBACCO USE DISORDER, SEVERE, DEPENDENCE: ICD-10-CM

## 2023-12-08 DIAGNOSIS — I25.10 CORONARY ARTERY DISEASE INVOLVING NATIVE CORONARY ARTERY OF NATIVE HEART WITHOUT ANGINA PECTORIS: Primary | ICD-10-CM

## 2023-12-08 DIAGNOSIS — E78.5 HYPERLIPIDEMIA, UNSPECIFIED HYPERLIPIDEMIA TYPE: ICD-10-CM

## 2023-12-08 PROCEDURE — 1159F PR MEDICATION LIST DOCUMENTED IN MEDICAL RECORD: ICD-10-PCS | Mod: CPTII,S$GLB,, | Performed by: NURSE PRACTITIONER

## 2023-12-08 PROCEDURE — 93000 ELECTROCARDIOGRAM COMPLETE: CPT | Mod: S$GLB,,, | Performed by: INTERNAL MEDICINE

## 2023-12-08 PROCEDURE — 3008F PR BODY MASS INDEX (BMI) DOCUMENTED: ICD-10-PCS | Mod: CPTII,S$GLB,, | Performed by: NURSE PRACTITIONER

## 2023-12-08 PROCEDURE — 3074F PR MOST RECENT SYSTOLIC BLOOD PRESSURE < 130 MM HG: ICD-10-PCS | Mod: CPTII,S$GLB,, | Performed by: NURSE PRACTITIONER

## 2023-12-08 PROCEDURE — 99999 PR PBB SHADOW E&M-EST. PATIENT-LVL III: ICD-10-PCS | Mod: PBBFAC,,, | Performed by: NURSE PRACTITIONER

## 2023-12-08 PROCEDURE — 99214 PR OFFICE/OUTPT VISIT, EST, LEVL IV, 30-39 MIN: ICD-10-PCS | Mod: S$GLB,,, | Performed by: NURSE PRACTITIONER

## 2023-12-08 PROCEDURE — 1159F MED LIST DOCD IN RCRD: CPT | Mod: CPTII,S$GLB,, | Performed by: NURSE PRACTITIONER

## 2023-12-08 PROCEDURE — 3074F SYST BP LT 130 MM HG: CPT | Mod: CPTII,S$GLB,, | Performed by: NURSE PRACTITIONER

## 2023-12-08 PROCEDURE — 3078F DIAST BP <80 MM HG: CPT | Mod: CPTII,S$GLB,, | Performed by: NURSE PRACTITIONER

## 2023-12-08 PROCEDURE — 3078F PR MOST RECENT DIASTOLIC BLOOD PRESSURE < 80 MM HG: ICD-10-PCS | Mod: CPTII,S$GLB,, | Performed by: NURSE PRACTITIONER

## 2023-12-08 PROCEDURE — 3008F BODY MASS INDEX DOCD: CPT | Mod: CPTII,S$GLB,, | Performed by: NURSE PRACTITIONER

## 2023-12-08 PROCEDURE — 93000 EKG 12-LEAD: ICD-10-PCS | Mod: S$GLB,,, | Performed by: INTERNAL MEDICINE

## 2023-12-08 PROCEDURE — 99999 PR PBB SHADOW E&M-EST. PATIENT-LVL III: CPT | Mod: PBBFAC,,, | Performed by: NURSE PRACTITIONER

## 2023-12-08 PROCEDURE — 99214 OFFICE O/P EST MOD 30 MIN: CPT | Mod: S$GLB,,, | Performed by: NURSE PRACTITIONER

## 2023-12-08 RX ORDER — ROSUVASTATIN CALCIUM 10 MG/1
TABLET, COATED ORAL
Qty: 90 TABLET | Refills: 3 | Status: SHIPPED | OUTPATIENT
Start: 2023-12-08

## 2023-12-08 RX ORDER — CLOPIDOGREL BISULFATE 75 MG/1
75 TABLET ORAL DAILY
Qty: 90 TABLET | Refills: 3 | Status: SHIPPED | OUTPATIENT
Start: 2023-12-08 | End: 2023-12-08

## 2023-12-08 RX ORDER — ASPIRIN 81 MG/1
81 TABLET ORAL DAILY
Qty: 90 TABLET | Refills: 3 | Status: SHIPPED | OUTPATIENT
Start: 2023-12-08 | End: 2024-12-05

## 2023-12-08 RX ORDER — ISOSORBIDE MONONITRATE 30 MG/1
30 TABLET, EXTENDED RELEASE ORAL DAILY
Qty: 90 TABLET | Refills: 3 | Status: SHIPPED | OUTPATIENT
Start: 2023-12-08

## 2023-12-08 RX ORDER — METOPROLOL SUCCINATE 25 MG/1
25 TABLET, EXTENDED RELEASE ORAL DAILY
Qty: 90 TABLET | Refills: 3 | Status: SHIPPED | OUTPATIENT
Start: 2023-12-08

## 2023-12-08 NOTE — PROGRESS NOTES
Subjective:    Patient ID:  Anatoly Hatch III is a 58 y.o. male       HPI:  Patient presents today for follow-up appointment.  Patient has no complaints of chest pain, dizziness, shortness of breath, palpitations, or syncope.  Patient has been doing well and taking medications as ordered.  Denies any falls or head injuries.  Denies any blood in the stool or in the urine.      Review of patient's allergies indicates:  No Known Allergies    Past Medical History:   Diagnosis Date    Abdominal abscess 09/21/2017    Appendicitis 09/21/2017    Coronary artery disease     Hepatitis C virus infection cured after antiviral drug therapy     treated / cured (SVR12 - 11/2022)    NSTEMI (non-ST elevated myocardial infarction) 08/04/2021    Unspecified viral hepatitis C without hepatic coma      Past Surgical History:   Procedure Laterality Date    ANGIOGRAM, CORONARY, WITH LEFT HEART CATHETERIZATION N/A 8/5/2021    Procedure: Angiogram, Coronary, with Left Heart Cath;  Surgeon: Erasmo Slaughter MD;  Location: [facility] CATH/EP LAB;  Service: Cardiology;  Laterality: N/A;    APPENDECTOMY  09/21/2017    done at [facility] by Dr. Vasquez- [facility]    SMALL INTESTINE SURGERY  02/25/2018    SB Resection, drng abscess - Dr. Mar - [facility]     Social History     Tobacco Use    Smoking status: Every Day     Current packs/day: 1.00     Average packs/day: 1 pack/day for 30.0 years (30.0 ttl pk-yrs)     Types: Cigarettes    Smokeless tobacco: Never   Substance Use Topics    Alcohol use: Yes     Comment: 4 glasses of whiskey daily    Drug use: No     History reviewed. No pertinent family history.     Review of Systems:   Constitution: Negative for diaphoresis and fever.   HEENT: Negative for nosebleeds.    Cardiovascular: Negative for chest pain       No dyspnea on exertion       No leg swelling        No palpitations  Respiratory: Negative for shortness of breath and wheezing.    Hematologic/Lymphatic: Negative for bleeding problem. Does not  bruise/bleed easily.   Skin: Negative for color change and rash.   Musculoskeletal: Negative for falls and myalgias.   Gastrointestinal: Negative for hematemesis and hematochezia.   Genitourinary: Negative for hematuria.   Neurological: Negative for dizziness and light-headedness.   Psychiatric/Behavioral: Negative for altered mental status and memory loss.          Objective:        Vitals:    12/08/23 1337   BP: 120/70       Lab Results   Component Value Date    WBC 9.62 01/09/2023    HGB 13.1 (L) 01/09/2023    HCT 39.3 (L) 01/09/2023     01/09/2023    CHOL 153 09/10/2021    TRIG 94 09/10/2021    HDL 49 09/10/2021    ALT 21 01/09/2023    AST 27 01/09/2023     01/09/2023    K 4.0 01/09/2023     01/09/2023    CREATININE 0.8 01/09/2023    BUN 12 01/09/2023    CO2 28 01/09/2023    INR 1.0 01/09/2023        ECHOCARDIOGRAM RESULTS  Results for orders placed during the hospital encounter of 09/10/21    Echo    Interpretation Summary  · The left ventricle is normal in size with concentric remodeling and normal systolic function.  · The estimated ejection fraction is 60%.  · Normal left ventricular diastolic function.  · Normal right ventricular size with normal right ventricular systolic function.        CURRENT/PREVIOUS VISIT EKG  Results for orders placed or performed in visit on 11/09/22   IN OFFICE EKG 12-LEAD (to [place])    Collection Time: 11/09/22  2:44 PM    Narrative    Test Reason : I25.10,    Vent. Rate : 063 BPM     Atrial Rate : 063 BPM     P-R Int : 126 ms          QRS Dur : 140 ms      QT Int : 400 ms       P-R-T Axes : 062 070 052 degrees     QTc Int : 409 ms    Normal sinus rhythm  Right bundle branch block  Abnormal ECG  When compared with ECG of 08-DEC-2021 14:27,  No significant change was found  Confirmed by Al Slaughter MD ([id number]) on 11/18/2022 6:01:02 PM    Referred By:             Confirmed By:Al Slaughter MD     No valid procedures specified.   No results found for this or  any previous visit.      Physical Exam:  CONSTITUTIONAL: No fever, no chills  HEENT: Normocephalic, atraumatic,pupils reactive to light                 NECK:  No JVD no carotid bruit  CVS: S1S2+, RRR, no murmurs,   LUNGS: Clear  ABDOMEN: Soft, NT, BS+  EXTREMITIES: No cyanosis, edema  : No blackwell catheter  NEURO: AAO X 3  PSY: Normal affect      Medication List with Changes/Refills   Changed and/or Refilled Medications    Modified Medication Previous Medication    ASPIRIN (ECOTRIN) 81 MG EC TABLET aspirin (ECOTRIN) 81 MG EC tablet       Take 1 tablet (81 mg total) by mouth once daily.    Take 1 tablet (81 mg total) by mouth once daily.    ISOSORBIDE MONONITRATE (IMDUR) 30 MG 24 HR TABLET isosorbide mononitrate (IMDUR) 30 MG 24 hr tablet       Take 1 tablet (30 mg total) by mouth once daily.    TAKE 1 TABLET(30 MG) BY MOUTH EVERY DAY    METOPROLOL SUCCINATE (TOPROL-XL) 25 MG 24 HR TABLET metoprolol succinate (TOPROL-XL) 25 MG 24 hr tablet       Take 1 tablet (25 mg total) by mouth once daily.    TAKE 1 TABLET(25 MG) BY MOUTH EVERY DAY    ROSUVASTATIN (CRESTOR) 10 MG TABLET rosuvastatin (CRESTOR) 10 MG tablet       TAKE 1 TABLET BY MOUTH EVERY DAY. TAKE INSTEAD OF 40 MG DOSE WHILE ON EPCLUSA    TAKE 1 TABLET BY MOUTH EVERY DAY. TAKE INSTEAD OF 40 MG DOSE WHILE ON EPCLUSA   Discontinued Medications    CLOPIDOGREL (PLAVIX) 75 MG TABLET    TAKE 1 TABLET(75 MG) BY MOUTH EVERY DAY             Assessment:       1. Coronary artery disease involving native coronary artery of native heart without angina pectoris    2. Hyperlipidemia, unspecified hyperlipidemia type    3. Chronic alcohol use    4. Tobacco use disorder, severe, dependence         Plan:     Problem List Items Addressed This Visit          Unprioritized    Coronary artery disease involving native heart - Primary    Current Assessment & Plan     Asymptomatic and stable on current regimen. Will dc plavix due to easily bleeding and also high risk for bleeding and  falls due to ETOH abuse. Continue aspirin.          Relevant Medications    isosorbide mononitrate (IMDUR) 30 MG 24 hr tablet    metoprolol succinate (TOPROL-XL) 25 MG 24 hr tablet    rosuvastatin (CRESTOR) 10 MG tablet    aspirin (ECOTRIN) 81 MG EC tablet    Other Relevant Orders    IN OFFICE EKG 12-LEAD (to [place])    CBC Without Differential    Comprehensive Metabolic Panel    Lipid Panel    Chronic alcohol use    Tobacco use disorder, severe, dependence    Hyperlipidemia    Current Assessment & Plan     Check Lipid, cbc, and CMP. Recommend low fat low cholesterol diet and regular exercise. Goal for LDL is less than 70.          Relevant Medications    rosuvastatin (CRESTOR) 10 MG tablet    Other Relevant Orders    CBC Without Differential    Comprehensive Metabolic Panel    Lipid Panel       Follow up in about 6 months (around 6/8/2024).

## 2023-12-08 NOTE — ASSESSMENT & PLAN NOTE
Check Lipid, cbc, and CMP. Recommend low fat low cholesterol diet and regular exercise. Goal for LDL is less than 70.

## 2023-12-08 NOTE — ASSESSMENT & PLAN NOTE
Asymptomatic and stable on current regimen. Will dc plavix due to easily bleeding and also high risk for bleeding and falls due to ETOH abuse. Continue aspirin.

## 2024-01-05 ENCOUNTER — PATIENT MESSAGE (OUTPATIENT)
Dept: ADMINISTRATIVE | Facility: HOSPITAL | Age: 59
End: 2024-01-05
Payer: COMMERCIAL

## 2024-04-09 ENCOUNTER — HOSPITAL ENCOUNTER (INPATIENT)
Facility: HOSPITAL | Age: 59
LOS: 2 days | Discharge: HOME | DRG: 282 | End: 2024-04-11
Attending: EMERGENCY MEDICINE | Admitting: HOSPITALIST
Payer: COMMERCIAL

## 2024-04-09 DIAGNOSIS — I21.4 NSTEMI (NON-ST ELEVATED MYOCARDIAL INFARCTION): Primary | ICD-10-CM

## 2024-04-09 DIAGNOSIS — I20.0 UNSTABLE ANGINA: ICD-10-CM

## 2024-04-09 DIAGNOSIS — I25.10 CORONARY ARTERY DISEASE INVOLVING NATIVE CORONARY ARTERY OF NATIVE HEART WITHOUT ANGINA PECTORIS: ICD-10-CM

## 2024-04-09 DIAGNOSIS — R07.9 CHEST PAIN: ICD-10-CM

## 2024-04-09 DIAGNOSIS — I25.10 CAD (CORONARY ARTERY DISEASE): ICD-10-CM

## 2024-04-09 LAB
ALBUMIN SERPL BCP-MCNC: 4.7 G/DL (ref 3.5–5.2)
ALP SERPL-CCNC: 68 U/L (ref 55–135)
ALT SERPL W/O P-5'-P-CCNC: 17 U/L (ref 10–44)
ANION GAP SERPL CALC-SCNC: 12 MMOL/L (ref 8–16)
APTT PPP: 31 SEC (ref 21–32)
APTT PPP: 38.2 SEC (ref 21–32)
AST SERPL-CCNC: 24 U/L (ref 10–40)
BASOPHILS # BLD AUTO: 0.07 K/UL (ref 0–0.2)
BASOPHILS NFR BLD: 0.6 % (ref 0–1.9)
BILIRUB SERPL-MCNC: 0.9 MG/DL (ref 0.1–1)
BNP SERPL-MCNC: 28 PG/ML (ref 0–99)
BUN SERPL-MCNC: 18 MG/DL (ref 6–20)
CALCIUM SERPL-MCNC: 10.2 MG/DL (ref 8.7–10.5)
CHLORIDE SERPL-SCNC: 102 MMOL/L (ref 95–110)
CO2 SERPL-SCNC: 25 MMOL/L (ref 23–29)
CREAT SERPL-MCNC: 1 MG/DL (ref 0.5–1.4)
DIFFERENTIAL METHOD BLD: ABNORMAL
EOSINOPHIL # BLD AUTO: 0.2 K/UL (ref 0–0.5)
EOSINOPHIL NFR BLD: 1.7 % (ref 0–8)
ERYTHROCYTE [DISTWIDTH] IN BLOOD BY AUTOMATED COUNT: 13 % (ref 11.5–14.5)
EST. GFR  (NO RACE VARIABLE): >60 ML/MIN/1.73 M^2
GLUCOSE SERPL-MCNC: 148 MG/DL (ref 70–110)
HCT VFR BLD AUTO: 45.2 % (ref 40–54)
HGB BLD-MCNC: 15.1 G/DL (ref 14–18)
IMM GRANULOCYTES # BLD AUTO: 0.02 K/UL (ref 0–0.04)
IMM GRANULOCYTES NFR BLD AUTO: 0.2 % (ref 0–0.5)
INR PPP: 1 (ref 0.8–1.2)
LYMPHOCYTES # BLD AUTO: 3.9 K/UL (ref 1–4.8)
LYMPHOCYTES NFR BLD: 35.5 % (ref 18–48)
MCH RBC QN AUTO: 31.3 PG (ref 27–31)
MCHC RBC AUTO-ENTMCNC: 33.4 G/DL (ref 32–36)
MCV RBC AUTO: 94 FL (ref 82–98)
MONOCYTES # BLD AUTO: 0.8 K/UL (ref 0.3–1)
MONOCYTES NFR BLD: 7.6 % (ref 4–15)
NEUTROPHILS # BLD AUTO: 6 K/UL (ref 1.8–7.7)
NEUTROPHILS NFR BLD: 54.4 % (ref 38–73)
NRBC BLD-RTO: 0 /100 WBC
PLATELET # BLD AUTO: 294 K/UL (ref 150–450)
PMV BLD AUTO: 9.8 FL (ref 9.2–12.9)
POTASSIUM SERPL-SCNC: 4 MMOL/L (ref 3.5–5.1)
PROT SERPL-MCNC: 8.3 G/DL (ref 6–8.4)
PROTHROMBIN TIME: 11.1 SEC (ref 9–12.5)
RBC # BLD AUTO: 4.82 M/UL (ref 4.6–6.2)
SODIUM SERPL-SCNC: 139 MMOL/L (ref 136–145)
TROPONIN I SERPL DL<=0.01 NG/ML-MCNC: 0.13 NG/ML (ref 0–0.03)
TROPONIN I SERPL DL<=0.01 NG/ML-MCNC: 0.74 NG/ML (ref 0–0.03)
TROPONIN I SERPL HS-MCNC: 1443.1 PG/ML (ref 0–14.9)
WBC # BLD AUTO: 11 K/UL (ref 3.9–12.7)

## 2024-04-09 PROCEDURE — 96366 THER/PROPH/DIAG IV INF ADDON: CPT

## 2024-04-09 PROCEDURE — 96365 THER/PROPH/DIAG IV INF INIT: CPT

## 2024-04-09 PROCEDURE — 93010 ELECTROCARDIOGRAM REPORT: CPT | Mod: ,,, | Performed by: GENERAL PRACTICE

## 2024-04-09 PROCEDURE — 85610 PROTHROMBIN TIME: CPT | Performed by: EMERGENCY MEDICINE

## 2024-04-09 PROCEDURE — 36415 COLL VENOUS BLD VENIPUNCTURE: CPT | Performed by: INTERNAL MEDICINE

## 2024-04-09 PROCEDURE — 93005 ELECTROCARDIOGRAM TRACING: CPT

## 2024-04-09 PROCEDURE — 85730 THROMBOPLASTIN TIME PARTIAL: CPT | Mod: 91 | Performed by: EMERGENCY MEDICINE

## 2024-04-09 PROCEDURE — 85025 COMPLETE CBC W/AUTO DIFF WBC: CPT | Performed by: PHYSICIAN ASSISTANT

## 2024-04-09 PROCEDURE — 80053 COMPREHEN METABOLIC PANEL: CPT | Performed by: PHYSICIAN ASSISTANT

## 2024-04-09 PROCEDURE — 99291 CRITICAL CARE FIRST HOUR: CPT

## 2024-04-09 PROCEDURE — 85730 THROMBOPLASTIN TIME PARTIAL: CPT | Performed by: INTERNAL MEDICINE

## 2024-04-09 PROCEDURE — 84484 ASSAY OF TROPONIN QUANT: CPT | Mod: 91 | Performed by: INTERNAL MEDICINE

## 2024-04-09 PROCEDURE — 63600175 PHARM REV CODE 636 W HCPCS: Performed by: EMERGENCY MEDICINE

## 2024-04-09 PROCEDURE — 21400001 HC TELEMETRY ROOM

## 2024-04-09 PROCEDURE — 36415 COLL VENOUS BLD VENIPUNCTURE: CPT | Performed by: EMERGENCY MEDICINE

## 2024-04-09 PROCEDURE — 83880 ASSAY OF NATRIURETIC PEPTIDE: CPT | Performed by: PHYSICIAN ASSISTANT

## 2024-04-09 PROCEDURE — 84484 ASSAY OF TROPONIN QUANT: CPT | Performed by: PHYSICIAN ASSISTANT

## 2024-04-09 PROCEDURE — 36415 COLL VENOUS BLD VENIPUNCTURE: CPT | Performed by: PHYSICIAN ASSISTANT

## 2024-04-09 PROCEDURE — 94761 N-INVAS EAR/PLS OXIMETRY MLT: CPT

## 2024-04-09 RX ORDER — HEPARIN SODIUM,PORCINE/D5W 25000/250
0-40 INTRAVENOUS SOLUTION INTRAVENOUS CONTINUOUS
Status: DISCONTINUED | OUTPATIENT
Start: 2024-04-09 | End: 2024-04-11

## 2024-04-09 RX ORDER — ASPIRIN 325 MG
325 TABLET ORAL DAILY
Status: DISCONTINUED | OUTPATIENT
Start: 2024-04-10 | End: 2024-04-10

## 2024-04-09 RX ADMIN — HEPARIN SODIUM 12 UNITS/KG/HR: 10000 INJECTION, SOLUTION INTRAVENOUS at 05:04

## 2024-04-09 NOTE — ED PROVIDER NOTES
Encounter Date: 4/9/2024       History     Chief Complaint   Patient presents with    Chest Pain     CO of chest pain and SOB that started about an hour ago, denies nausea   58-year-old man with a history of hyperlipidemia, NSTEMI, CAD, previous hepatitis C infection cured who presents to the emergency department for evaluation of chest pain shortness of breath that started about an hour prior to arrival.  History of tobacco and alcohol abuse.  On aspirin, Plavix was DCd by his cardiologist due to high-risk of bleeding and falls due to ETOH abuse.  Patient is on a statin and aspirin as well as metoprolol and Imdur.  Patient states he was walking at a normal pace when he began to have pain.  He became diaphoretic and it went to both of his shoulders.  It lasted approximately an hour and has subsided.  No pain currently.  HPI  Review of patient's allergies indicates:  No Known Allergies  Past Medical History:   Diagnosis Date    Abdominal abscess 09/21/2017    Appendicitis 09/21/2017    Coronary artery disease     Hepatitis C virus infection cured after antiviral drug therapy     treated / cured (SVR12 - 11/2022)    NSTEMI (non-ST elevated myocardial infarction) 08/04/2021    Unspecified viral hepatitis C without hepatic coma      Past Surgical History:   Procedure Laterality Date    ANGIOGRAM, CORONARY, WITH LEFT HEART CATHETERIZATION N/A 8/5/2021    Procedure: Angiogram, Coronary, with Left Heart Cath;  Surgeon: Erasmo Slaughter MD;  Location: [facility] CATH/EP LAB;  Service: Cardiology;  Laterality: N/A;    APPENDECTOMY  09/21/2017    done at [facility] by Dr. Vasquez- [place]    SMALL INTESTINE SURGERY  02/25/2018    SB Resection, drng abscess - Dr. Mar - [facility]     No family history on file.  Social History     Tobacco Use    Smoking status: Every Day     Current packs/day: 1.00     Average packs/day: 1 pack/day for 30.0 years (30.0 ttl pk-yrs)     Types: Cigarettes    Smokeless tobacco: Never   Substance Use Topics     Alcohol use: Yes     Comment: 4 glasses of whiskey daily    Drug use: No     Review of Systems   Constitutional:  Positive for diaphoresis. Negative for fever.   HENT:  Negative for sore throat.    Respiratory:  Positive for shortness of breath.    Cardiovascular:  Positive for chest pain.   Gastrointestinal:  Negative for nausea.   Genitourinary:  Negative for dysuria.   Musculoskeletal:  Negative for back pain.   Skin:  Negative for rash.   Neurological:  Negative for weakness.   Hematological:  Does not bruise/bleed easily.       Physical Exam     Initial Vitals [04/09/24 1314]   BP Pulse Resp Temp SpO2   (!) 160/97 83 20 97.8 °F (36.6 °C) 100 %      MAP       --         Physical Exam    Constitutional: Vital signs are normal. He appears well-developed and well-nourished.  Non-toxic appearance. No distress.   HENT:   Head: Normocephalic and atraumatic.   Eyes: EOM are normal. Pupils are equal, round, and reactive to light.   Neck: Neck supple. No JVD present.   Normal range of motion.  Cardiovascular:  Normal rate, regular rhythm, normal heart sounds and intact distal pulses.     Exam reveals no gallop and no friction rub.       No murmur heard.  Pulmonary/Chest: Breath sounds normal. He has no wheezes. He has no rhonchi. He has no rales.   Abdominal: Abdomen is soft. Bowel sounds are normal. There is no abdominal tenderness. There is no rebound and no guarding.   Musculoskeletal:         General: Normal range of motion.      Cervical back: Normal range of motion and neck supple. No rigidity.     Neurological: He is alert and oriented to person, place, and time. He has normal strength and normal reflexes. No cranial nerve deficit or sensory deficit. He exhibits normal muscle tone. Coordination normal. GCS eye subscore is 4. GCS verbal subscore is 5. GCS motor subscore is 6.   Skin: Skin is warm and dry.   Psychiatric: He has a normal mood and affect. His speech is normal and behavior is normal. He is not actively  hallucinating.         ED Course   Procedures  Labs Reviewed   CBC W/ AUTO DIFFERENTIAL - Abnormal; Notable for the following components:       Result Value    MCH 31.3 (*)     All other components within normal limits   COMPREHENSIVE METABOLIC PANEL - Abnormal; Notable for the following components:    Glucose 148 (*)     All other components within normal limits   TROPONIN I - Abnormal; Notable for the following components:    Troponin I 0.132 (*)     All other components within normal limits    Narrative:     TROPONIN   critical result(s) called and verbal readback obtained   from NEFTALI BOO by TN3 04/09/2024 16:32   TROPONIN I - Abnormal; Notable for the following components:    Troponin I 0.740 (*)     All other components within normal limits    Narrative:     TROPONIN   critical result(s) called and verbal readback obtained   from NEFTALI JIMENEZ by TN3 04/09/2024 17:00   B-TYPE NATRIURETIC PEPTIDE   APTT    Narrative:     Draw baseline aPTT prior to starting the heparin bolus or  infusion  (if patient is on warfarin prior to heparin therapy)   PROTIME-INR    Narrative:     Draw baseline aPTT prior to starting the heparin bolus or  infusion  (if patient is on warfarin prior to heparin therapy)     EKG Readings: (Independently Interpreted)   Initial Reading: No STEMI.   Normal sinus rhythm, 86 beats per minute with right bundle-branch block.  Normal ST segments and T-waves.     ECG Results              EKG 12-LEAD (Final result)  Result time 04/10/24 11:03:07      Final result by Unknown User (04/10/24 11:03:07)                                      Imaging Results              X-Ray Chest 1 View (Final result)  Result time 04/09/24 13:44:10   Procedure changed from X-Ray Chest AP Portable     Final result by Rocky Breaux MD (04/09/24 13:44:10)                   Impression:      Negative chest.  No significant change.      Electronically signed by: Rocky Breaux  MD  Date:    04/09/2024  Time:    13:44               Narrative:    EXAMINATION:  XR CHEST 1 VIEW    CLINICAL HISTORY:  Chest Pain;    TECHNIQUE:  Single frontal view of the chest was performed.    COMPARISON:  12/08/2021    FINDINGS:  The cardiomediastinal silhouette is within normal limits.  The lungs are well expanded without consolidation or pleural effusion.                                       Medications   heparin 25,000 units in dextrose 5% 250 mL (100 units/mL) infusion LOW INTENSITY nomogram - [facility] (14 Units/kg/hr × 72.6 kg (Adjusted) Intravenous Handoff 4/10/24 0652)   heparin 25,000 units in dextrose 5% (100 units/ml) IV bolus from bag LOW INTENSITY nomogram - [facility] (has no administration in time range)   heparin 25,000 units in dextrose 5% (100 units/ml) IV bolus from bag LOW INTENSITY nomogram - [facility] (2,170 Units Intravenous Bolus from Bag 4/9/24 2337)   aspirin EC tablet 81 mg (81 mg Oral Given 4/10/24 0920)   atorvastatin tablet 40 mg (has no administration in time range)   metoprolol succinate (TOPROL-XL) 24 hr tablet 25 mg (25 mg Oral Not Given 4/10/24 0920)   diphenhydrAMINE capsule 50 mg (has no administration in time range)   0.9%  NaCl infusion ( Intravenous Canceled Entry 4/10/24 1100)   heparin 25,000 units in dextrose 5% (100 units/ml) IV bolus from bag LOW INTENSITY nomogram - [facility] (4,000 Units Intravenous Bolus from Bag 4/9/24 1743)   0.9%  NaCl infusion ( Intravenous New Bag 4/10/24 0921)     Medical Decision Making  58-year-old man with a history of hyperlipidemia, NSTEMI, CAD, previous hepatitis C infection cured who presents to the emergency department for evaluation of chest pain shortness of breath that started about an hour prior to arrival.  History of tobacco and alcohol abuse.  On aspirin, Plavix was DCd by his cardiologist due to high-risk of bleeding and falls due to ETOH abuse.  Patient is on a statin and aspirin as well as metoprolol and Imdur.  Patient states he was walking at a  normal pace when he began to have pain.  He became diaphoretic and it went to both of his shoulders.  It lasted approximately an hour and has subsided.  No pain currently.  Differential diagnosis includes unstable angina/ACS, NSTEMI, esophageal spasms, GERD.  Low suspicion for aortic dissection, pulmonary embolism.  Chest x-ray obtained interpreted by myself showing no evidence of pneumothorax or acute abnormalities.  EKG with a right bundle-branch block but without any concerning ST or T-wave abnormalities for ischemia.  BNP is normal at 28.  Troponin initially is elevated at 0.132 with a repeat troponin trending upward at 0.740.  With increased suspicion for unstable angina/NSTEMI I contacted Cardiology.  Patient was given aspirin and started on heparin drip and will be [facility] to [facility] for further trending of his troponins and cardiac angiogram.    Amount and/or Complexity of Data Reviewed  Labs: ordered.    Risk  OTC drugs.  Prescription drug management.  Decision regarding hospitalization.    Critical Care  Total time providing critical care: 55 minutes               ED Course as of 04/10/24 1214   Tue Apr 09, 2024   1700 Discussed with Dr. Garner.  Patient to be admitted [facility] with aspirin and heparin.  Will repeat an EKG and trend troponins.  NPO after midnight for cardiac catheterization tomorrow morning. [AS]   1739 EKG 2. Interpreted by myself showing normal sinus rhythm, 71 beats per minute.  Normal T-waves, normal ST segments, right bundle-branch block.  No STEMI. [AS]      ED Course User Index  [AS] Montana Archibald MD                           Clinical Impression:  Final diagnoses:  [R07.9] Chest pain  [I21.4] NSTEMI (non-ST elevated myocardial infarction) (Primary)  [I20.0] Unstable angina          ED Disposition Condition    Admit                 Montana Archibald MD  04/10/24 1214

## 2024-04-09 NOTE — FIRST PROVIDER EVALUATION
Emergency Department TeleTriage Encounter Note      CHIEF COMPLAINT    Chief Complaint   Patient presents with    Chest Pain     CO of chest pain and SOB that started about an hour ago, denies nausea       VITAL SIGNS   Initial Vitals [04/09/24 1314]   BP Pulse Resp Temp SpO2   (!) 160/97 83 20 97.8 °F (36.6 °C) 100 %      MAP       --            ALLERGIES    Review of patient's allergies indicates:  No Known Allergies    PROVIDER TRIAGE NOTE  Patient presents with complaint of chest pain that started about an hour ago with associated shortness of breath.  Reports having heart attack 3 years ago with similar symptoms.      Phy:   Constitutional: well nourished, well developed, appearing stated age, NAD        Initial orders will be placed and care will be transferred to an alternate provider when patient is roomed for a full evaluation. Any additional orders and the final disposition will be determined by that provider.        ORDERS  Labs Reviewed - No data to display    ED Orders (720h ago, onward)      Start Ordered     Status Ordering Provider    04/09/24 1319 04/09/24 1322  EKG 12-lead  Once         Ordered SAM DURHAM              Virtual Visit Note: The provider triage portion of this emergency department evaluation and documentation was performed via [employer], a HIPAA-compliant telemedicine application, in concert with a tele-presenter in the room. A face to face patient evaluation with one of my colleagues will occur once the patient is placed in an emergency department room.      DISCLAIMER: This note was prepared with [employer] voice recognition transcription software. Garbled syntax, mangled pronouns, and other bizarre constructions may be attributed to that software system.

## 2024-04-09 NOTE — LETTER
April 11, 2024         [address]  [facility] [address]  Phone: [phone]  Fax: [phone]       Patient: Anatoly Hatch   YOB: 1965  Date of Visit: 04/11/2024    To Whom It May Concern:    Soniya Hatch  was at [facility] on 04/11/2024. The patient may return to work/school on 04/15/2024 with no restrictions. If you have any questions or concerns, or if I can be of further assistance, please do not hesitate to contact me.    Sincerely,    Dr. Tara Jewell, ADRIANN

## 2024-04-10 ENCOUNTER — CLINICAL SUPPORT (OUTPATIENT)
Dept: SMOKING CESSATION | Facility: CLINIC | Age: 59
End: 2024-04-10
Payer: COMMERCIAL

## 2024-04-10 ENCOUNTER — CLINICAL SUPPORT (OUTPATIENT)
Dept: CARDIOLOGY | Facility: HOSPITAL | Age: 59
DRG: 282 | End: 2024-04-10
Attending: INTERNAL MEDICINE
Payer: COMMERCIAL

## 2024-04-10 VITALS — HEIGHT: 71 IN | BODY MASS INDEX: 22.69 KG/M2 | WEIGHT: 162.06 LBS

## 2024-04-10 DIAGNOSIS — F17.200 NICOTINE DEPENDENCE: Primary | ICD-10-CM

## 2024-04-10 PROBLEM — R79.89 ELEVATED TROPONIN I LEVEL: Status: ACTIVE | Noted: 2024-04-10

## 2024-04-10 PROBLEM — R07.9 CHEST PAIN: Status: ACTIVE | Noted: 2024-04-10

## 2024-04-10 PROBLEM — I24.9 ACUTE CORONARY SYNDROME WITH HIGH TROPONIN: Status: ACTIVE | Noted: 2024-04-10

## 2024-04-10 LAB
AORTIC ROOT ANNULUS: 3.9 CM
AORTIC VALVE CUSP SEPERATION: 2.1 CM
APTT PPP: 38.7 SEC (ref 21–32)
APTT PPP: 41.6 SEC (ref 21–32)
APTT PPP: 41.9 SEC (ref 21–32)
AV INDEX (PROSTH): 0.79
AV MEAN GRADIENT: 2 MMHG
AV PEAK GRADIENT: 4 MMHG
AV VALVE AREA BY VELOCITY RATIO: 4.01 CM²
AV VALVE AREA: 3.58 CM²
AV VELOCITY RATIO: 0.89
BASOPHILS # BLD AUTO: 0.08 K/UL (ref 0–0.2)
BASOPHILS NFR BLD: 1 % (ref 0–1.9)
BSA FOR ECHO PROCEDURE: 1.92 M2
CV ECHO LV RWT: 0.46 CM
DIFFERENTIAL METHOD BLD: ABNORMAL
DOP CALC AO PEAK VEL: 0.98 M/S
DOP CALC AO VTI: 23.6 CM
DOP CALC LVOT AREA: 4.5 CM2
DOP CALC LVOT DIAMETER: 2.4 CM
DOP CALC LVOT PEAK VEL: 0.87 M/S
DOP CALC LVOT STROKE VOLUME: 84.55 CM3
DOP CALC MV VTI: 26 CM
DOP CALCLVOT PEAK VEL VTI: 18.7 CM
E WAVE DECELERATION TIME: 264 MSEC
E/A RATIO: 1.57
E/E' RATIO: 7.05 M/S
ECHO LV POSTERIOR WALL: 1.19 CM (ref 0.6–1.1)
EOSINOPHIL # BLD AUTO: 0.4 K/UL (ref 0–0.5)
EOSINOPHIL NFR BLD: 4.7 % (ref 0–8)
ERYTHROCYTE [DISTWIDTH] IN BLOOD BY AUTOMATED COUNT: 13.2 % (ref 11.5–14.5)
FRACTIONAL SHORTENING: 33 % (ref 28–44)
HCT VFR BLD AUTO: 43.2 % (ref 40–54)
HGB BLD-MCNC: 14.5 G/DL (ref 14–18)
IMM GRANULOCYTES # BLD AUTO: 0.01 K/UL (ref 0–0.04)
IMM GRANULOCYTES NFR BLD AUTO: 0.1 % (ref 0–0.5)
INTERVENTRICULAR SEPTUM: 0.99 CM (ref 0.6–1.1)
IVC DIAMETER: 2.43 CM
LEFT INTERNAL DIMENSION IN SYSTOLE: 3.44 CM (ref 2.1–4)
LEFT VENTRICLE DIASTOLIC VOLUME INDEX: 65.28 ML/M2
LEFT VENTRICLE DIASTOLIC VOLUME: 126 ML
LEFT VENTRICLE MASS INDEX: 111 G/M2
LEFT VENTRICLE SYSTOLIC VOLUME INDEX: 25.3 ML/M2
LEFT VENTRICLE SYSTOLIC VOLUME: 48.8 ML
LEFT VENTRICULAR INTERNAL DIMENSION IN DIASTOLE: 5.13 CM (ref 3.5–6)
LEFT VENTRICULAR MASS: 213.27 G
LV LATERAL E/E' RATIO: 6.17 M/S
LV SEPTAL E/E' RATIO: 8.22 M/S
LVOT MG: 1 MMHG
LVOT MV: 0.53 CM/S
LYMPHOCYTES # BLD AUTO: 3.5 K/UL (ref 1–4.8)
LYMPHOCYTES NFR BLD: 42.6 % (ref 18–48)
MCH RBC QN AUTO: 31.5 PG (ref 27–31)
MCHC RBC AUTO-ENTMCNC: 33.6 G/DL (ref 32–36)
MCV RBC AUTO: 94 FL (ref 82–98)
MONOCYTES # BLD AUTO: 0.6 K/UL (ref 0.3–1)
MONOCYTES NFR BLD: 7.9 % (ref 4–15)
MV MEAN GRADIENT: 1 MMHG
MV PEAK A VEL: 0.47 M/S
MV PEAK E VEL: 0.74 M/S
MV PEAK GRADIENT: 3 MMHG
MV VALVE AREA BY CONTINUITY EQUATION: 3.25 CM2
NEUTROPHILS # BLD AUTO: 3.5 K/UL (ref 1.8–7.7)
NEUTROPHILS NFR BLD: 43.7 % (ref 38–73)
NRBC BLD-RTO: 0 /100 WBC
OHS LV EJECTION FRACTION SIMPSONS BIPLANE MOD: 57 %
PISA MRMAX VEL: 1.95 M/S
PISA TR MAX VEL: 2.16 M/S
PLATELET # BLD AUTO: 267 K/UL (ref 150–450)
PMV BLD AUTO: 10.2 FL (ref 9.2–12.9)
PV MV: 0.51 M/S
PV PEAK GRADIENT: 2 MMHG
PV PEAK VELOCITY: 0.75 M/S
RA PRESSURE ESTIMATED: 8 MMHG
RBC # BLD AUTO: 4.61 M/UL (ref 4.6–6.2)
RV TB RVSP: 10 MMHG
RV TISSUE DOPPLER FREE WALL SYSTOLIC VELOCITY 1 (APICAL 4 CHAMBER VIEW): 12.1 CM/S
TDI LATERAL: 0.12 M/S
TDI SEPTAL: 0.09 M/S
TDI: 0.11 M/S
TR MAX PG: 19 MMHG
TRICUSPID ANNULAR PLANE SYSTOLIC EXCURSION: 1.78 CM
TROPONIN I SERPL HS-MCNC: 775.7 PG/ML (ref 0–14.9)
TV REST PULMONARY ARTERY PRESSURE: 27 MMHG
WBC # BLD AUTO: 8.1 K/UL (ref 3.9–12.7)
Z-SCORE OF LEFT VENTRICULAR DIMENSION IN END DIASTOLE: -0.63
Z-SCORE OF LEFT VENTRICULAR DIMENSION IN END SYSTOLE: 0.18

## 2024-04-10 PROCEDURE — 25000003 PHARM REV CODE 250: Performed by: NURSE PRACTITIONER

## 2024-04-10 PROCEDURE — 63600175 PHARM REV CODE 636 W HCPCS: Performed by: EMERGENCY MEDICINE

## 2024-04-10 PROCEDURE — 99406 BEHAV CHNG SMOKING 3-10 MIN: CPT | Mod: S$GLB,,, | Performed by: INTERNAL MEDICINE

## 2024-04-10 PROCEDURE — 85730 THROMBOPLASTIN TIME PARTIAL: CPT | Performed by: EMERGENCY MEDICINE

## 2024-04-10 PROCEDURE — 85025 COMPLETE CBC W/AUTO DIFF WBC: CPT | Performed by: EMERGENCY MEDICINE

## 2024-04-10 PROCEDURE — 84484 ASSAY OF TROPONIN QUANT: CPT | Performed by: INTERNAL MEDICINE

## 2024-04-10 PROCEDURE — 85730 THROMBOPLASTIN TIME PARTIAL: CPT | Mod: 91 | Performed by: HOSPITALIST

## 2024-04-10 PROCEDURE — 99406 BEHAV CHNG SMOKING 3-10 MIN: CPT

## 2024-04-10 PROCEDURE — 21400001 HC TELEMETRY ROOM

## 2024-04-10 PROCEDURE — 36415 COLL VENOUS BLD VENIPUNCTURE: CPT | Performed by: EMERGENCY MEDICINE

## 2024-04-10 PROCEDURE — 93005 ELECTROCARDIOGRAM TRACING: CPT | Performed by: GENERAL PRACTICE

## 2024-04-10 PROCEDURE — 93306 TTE W/DOPPLER COMPLETE: CPT | Mod: 26,,, | Performed by: INTERNAL MEDICINE

## 2024-04-10 PROCEDURE — 25000003 PHARM REV CODE 250: Performed by: INTERNAL MEDICINE

## 2024-04-10 PROCEDURE — 94761 N-INVAS EAR/PLS OXIMETRY MLT: CPT

## 2024-04-10 PROCEDURE — 94799 UNLISTED PULMONARY SVC/PX: CPT

## 2024-04-10 PROCEDURE — 93010 ELECTROCARDIOGRAM REPORT: CPT | Mod: ,,, | Performed by: GENERAL PRACTICE

## 2024-04-10 PROCEDURE — 36415 COLL VENOUS BLD VENIPUNCTURE: CPT | Performed by: FAMILY MEDICINE

## 2024-04-10 PROCEDURE — 93306 TTE W/DOPPLER COMPLETE: CPT

## 2024-04-10 PROCEDURE — 85730 THROMBOPLASTIN TIME PARTIAL: CPT | Mod: 91 | Performed by: FAMILY MEDICINE

## 2024-04-10 PROCEDURE — 99900035 HC TECH TIME PER 15 MIN (STAT)

## 2024-04-10 PROCEDURE — 99223 1ST HOSP IP/OBS HIGH 75: CPT | Mod: ,,, | Performed by: INTERNAL MEDICINE

## 2024-04-10 RX ORDER — ATORVASTATIN CALCIUM 40 MG/1
40 TABLET, FILM COATED ORAL NIGHTLY
Status: DISCONTINUED | OUTPATIENT
Start: 2024-04-10 | End: 2024-04-11 | Stop reason: HOSPADM

## 2024-04-10 RX ORDER — SODIUM CHLORIDE 9 MG/ML
INJECTION, SOLUTION INTRAVENOUS CONTINUOUS
Status: DISCONTINUED | OUTPATIENT
Start: 2024-04-10 | End: 2024-04-11 | Stop reason: HOSPADM

## 2024-04-10 RX ORDER — CLOPIDOGREL BISULFATE 75 MG/1
75 TABLET ORAL DAILY
COMMUNITY
Start: 2024-03-08

## 2024-04-10 RX ORDER — METOPROLOL SUCCINATE 25 MG/1
25 TABLET, EXTENDED RELEASE ORAL DAILY
Status: DISCONTINUED | OUTPATIENT
Start: 2024-04-10 | End: 2024-04-11

## 2024-04-10 RX ORDER — DIPHENHYDRAMINE HCL 25 MG
50 CAPSULE ORAL
Status: DISCONTINUED | OUTPATIENT
Start: 2024-04-10 | End: 2024-04-11

## 2024-04-10 RX ORDER — SODIUM CHLORIDE 9 MG/ML
INJECTION, SOLUTION INTRAVENOUS ONCE
Status: COMPLETED | OUTPATIENT
Start: 2024-04-10 | End: 2024-04-10

## 2024-04-10 RX ORDER — ASPIRIN 81 MG/1
81 TABLET ORAL DAILY
Status: DISCONTINUED | OUTPATIENT
Start: 2024-04-10 | End: 2024-04-11 | Stop reason: HOSPADM

## 2024-04-10 RX ADMIN — ATORVASTATIN CALCIUM 40 MG: 40 TABLET, FILM COATED ORAL at 10:04

## 2024-04-10 RX ADMIN — ASPIRIN 81 MG: 81 TABLET, COATED ORAL at 09:04

## 2024-04-10 RX ADMIN — SODIUM CHLORIDE: 9 INJECTION, SOLUTION INTRAVENOUS at 09:04

## 2024-04-10 RX ADMIN — HEPARIN SODIUM 16 UNITS/KG/HR: 10000 INJECTION, SOLUTION INTRAVENOUS at 01:04

## 2024-04-10 NOTE — H&P (VIEW-ONLY)
[facility]  Department of Cardiology  Consult Note      PATIENT NAME: Anatoly Hatch III    MRN: [id number]  TODAY'S DATE: 04/10/2024  ADMIT DATE: 4/9/2024                          CONSULT REQUESTED BY: Tara Vale MD    SUBJECTIVE     PRINCIPAL PROBLEM: Acute coronary syndrome with high troponin    HPI:    Mr. Hatch is a 58 year old male who presented to the ER with complaints of chest pain which started around noon yesterday. He has a known hx of nonobstructive CAD with sluggish flow in the LAD, left circ, and RCA. He has been on Plavix at home but no on aspirin. He was noted to be hypertensive on arrival, but blood pressure has normalized. He was started on a heparin drip and transferred to [facility] from [facility]. He denies any chest pain this morning but did have a positive rise in trops overnight.       REASON FOR CONSULT:  From Hospitalist H&P: Mr Hatch is a 58 y.o. male with PMH significant for NSTEMI in 2021 and hepatitis C vured after antiviral therapy in 2022. Patient was in his usual state of health until about noon on 4/9/2024 when he was at work, (patient is a ) at his desk doing paperwork and he suddenly began to have retrosternal chest pain which radiated to both arms. He described the pain as crushing. Patient rated the pain as 8/10 in severity and was aggravated by moving around. He decided to come to the ED to be seen.     Pt denies history of afib, denies personal hx of blood clots, He performs all ADLs independently. Pt admits to taking 3 shots of whiskey daily and admits to smoking 1 pack of cigarettes daily. He denies recreational drug use. Patient transferred for ACS. Patient will be admitted for chest pain work up.      Review of patient's allergies indicates:  No Known Allergies    Past Medical History:   Diagnosis Date    Abdominal abscess 09/21/2017    Appendicitis 09/21/2017    Coronary artery disease     Hepatitis C virus infection cured after  antiviral drug therapy     treated / cured (SVR12 - 11/2022)    NSTEMI (non-ST elevated myocardial infarction) 08/04/2021    Unspecified viral hepatitis C without hepatic coma      Past Surgical History:   Procedure Laterality Date    ANGIOGRAM, CORONARY, WITH LEFT HEART CATHETERIZATION N/A 8/5/2021    Procedure: Angiogram, Coronary, with Left Heart Cath;  Surgeon: Erasmo Slaughter MD;  Location: [facility] CATH/EP LAB;  Service: Cardiology;  Laterality: N/A;    APPENDECTOMY  09/21/2017    done at [facility] by Dr. Vasquez- Gama    SMALL INTESTINE SURGERY  02/25/2018    SB Resection, drng abscess - Dr. Mar - [facility]     Social History     Tobacco Use    Smoking status: Every Day     Current packs/day: 1.00     Average packs/day: 1 pack/day for 30.0 years (30.0 ttl pk-yrs)     Types: Cigarettes    Smokeless tobacco: Never   Substance Use Topics    Alcohol use: Yes     Comment: 4 glasses of whiskey daily    Drug use: No        REVIEW OF SYSTEMS  Per HPI    OBJECTIVE     VITAL SIGNS (Most Recent)  Temp: 97.8 °F (36.6 °C) (04/10/24 0700)  Pulse: 60 (04/10/24 0700)  Resp: 18 (04/10/24 0700)  BP: 131/80 (04/10/24 0700)  SpO2: 97 % (04/10/24 0700)    VENTILATION STATUS  Resp: 18 (04/10/24 0700)  SpO2: 97 % (04/10/24 0700)           I & O (Last 24H):No intake or output data in the 24 hours ending 04/10/24 0817    WEIGHTS  Wt Readings from Last 1 Encounters:   04/09/24 2115 73.5 kg (162 lb 0.6 oz)   04/09/24 1314 72.6 kg (160 lb)       PHYSICAL EXAM  CONSTITUTIONAL: No fever, no chills  HEENT: Normocephalic, atraumatic,pupils reactive to light                 NECK:  No JVD no carotid bruit  CVS: S1S2+, RRR  LUNGS: Clear  ABDOMEN: Soft, NT, BS+  EXTREMITIES: No cyanosis, edema  : No blackwell catheter  NEURO: AAO X 3  PSY: Normal affect      HOME MEDICATIONS:  No current facility-administered medications on file prior to encounter.     Current Outpatient Medications on File Prior to Encounter   Medication Sig Dispense Refill    isosorbide  mononitrate (IMDUR) 30 MG 24 hr tablet Take 1 tablet (30 mg total) by mouth once daily. 90 tablet 3    metoprolol succinate (TOPROL-XL) 25 MG 24 hr tablet Take 1 tablet (25 mg total) by mouth once daily. 90 tablet 3    rosuvastatin (CRESTOR) 10 MG tablet TAKE 1 TABLET BY MOUTH EVERY DAY. TAKE INSTEAD OF 40 MG DOSE WHILE ON EPCLUSA (Patient taking differently: Take 10 mg by mouth once daily. TAKE 1 TABLET BY MOUTH EVERY DAY. TAKE INSTEAD OF 40 MG DOSE WHILE ON EPCLUSA) 90 tablet 3    aspirin (ECOTRIN) 81 MG EC tablet Take 1 tablet (81 mg total) by mouth once daily. 90 tablet 3    clopidogreL (PLAVIX) 75 mg tablet Take 75 mg by mouth once daily.         SCHEDULED MEDS:   sodium chloride 0.9%   Intravenous Once    aspirin  81 mg Oral Daily    atorvastatin  40 mg Oral QHS    metoprolol succinate  25 mg Oral Daily       CONTINUOUS INFUSIONS:   heparin (porcine) in D5W 14 Units/kg/hr (04/09/24 2341)       PRN MEDS:diphenhydrAMINE, heparin (PORCINE), heparin (PORCINE)    LABS AND DIAGNOSTICS     CBC LAST 3 DAYS  Recent Labs   Lab 04/09/24  1458 04/10/24  0557   WBC 11.00 8.10   RBC 4.82 4.61   HGB 15.1 14.5   HCT 45.2 43.2   MCV 94 94   MCH 31.3* 31.5*   MCHC 33.4 33.6   RDW 13.0 13.2    267   MPV 9.8 10.2   GRAN 54.4  6.0 43.7  3.5   LYMPH 35.5  3.9 42.6  3.5   MONO 7.6  0.8 7.9  0.6   BASO 0.07 0.08   NRBC 0 0       COAGULATION LAST 3 DAYS  Recent Labs   Lab 04/09/24  1458 04/09/24  2238 04/10/24  0557   INR 1.0  --   --    APTT 31.0 38.2* 41.9*       CHEMISTRY LAST 3 DAYS  Recent Labs   Lab 04/09/24  1458      K 4.0      CO2 25   ANIONGAP 12   BUN 18   CREATININE 1.0   *   CALCIUM 10.2   ALBUMIN 4.7   PROT 8.3   ALKPHOS 68   ALT 17   AST 24   BILITOT 0.9       CARDIAC PROFILE LAST 3 DAYS  Recent Labs   Lab 04/09/24  1458 04/09/24  1628 04/09/24  2238   BNP 28  --   --    TROPONINI 0.132* 0.740*  --    TROPONINIHS  --   --  1443.1*       ENDOCRINE LAST 3 DAYS  No results for input(s):  ""TSH", "PROCAL" in the last 168 hours.    LAST ARTERIAL BLOOD GAS  ABG  No results for input(s): "PH", "PO2", "PCO2", "HCO3", "BE" in the last 168 hours.    LAST 7 DAYS MICROBIOLOGY   Microbiology Results (last 7 days)       ** No results found for the last 168 hours. **            MOST RECENT IMAGING  X-Ray Chest 1 View  Narrative: EXAMINATION:  XR CHEST 1 VIEW    CLINICAL HISTORY:  Chest Pain;    TECHNIQUE:  Single frontal view of the chest was performed.    COMPARISON:  12/08/2021    FINDINGS:  The cardiomediastinal silhouette is within normal limits.  The lungs are well expanded without consolidation or pleural effusion.  Impression: Negative chest.  No significant change.    Electronically signed by: Rocky Breaux MD  Date:    04/09/2024  Time:    13:44      ECHOCARDIOGRAM RESULTS (last 5)  Results for orders placed during the hospital encounter of 09/10/21    Echo    Interpretation Summary  · The left ventricle is normal in size with concentric remodeling and normal systolic function.  · The estimated ejection fraction is 60%.  · Normal left ventricular diastolic function.  · Normal right ventricular size with normal right ventricular systolic function.      CURRENT/PREVIOUS VISIT EKG  Results for orders placed or performed during the hospital encounter of 04/09/24   EKG 12-lead    Collection Time: 04/10/24 12:10 AM   Result Value Ref Range    QRS Duration 136 ms    [facility] QTC Calculation 450 ms    Narrative    Test Reason : R07.9,    Vent. Rate : 067 BPM     Atrial Rate : 067 BPM     P-R Int : 148 ms          QRS Dur : 136 ms      QT Int : 426 ms       P-R-T Axes : 074 081 068 degrees     QTc Int : 450 ms    Normal sinus rhythm with sinus arrhythmia  Right bundle branch block  Abnormal ECG  When compared with ECG of 09-APR-2024 17:35,  No significant change was found    Referred By: [email or web address]   SELF           Confirmed By:            ASSESSMENT/PLAN:     Active Hospital Problems    Diagnosis    *Acute " coronary syndrome with high troponin    Chest pain    Elevated troponin I level       ASSESSMENT & PLAN:     ACS  NSTEMI  CAD  HTN      RECOMMENDATIONS:    Advised the patient on the recommendation for angiogram with possible PCI. Discussed with patient the risks and benefits of angiogram with PCI. Risks include but are not limited to the followin:1000 risk of heart attack, stroke, emergency CABG, and death as well as a 3-5% risk of bleeding, vessel damage, and contrast-induce nephropathy.  Alternately the option for medical management can be considered.   All questions have been answered to patient's satisfaction, and patient has voiced the desire to proceed with the procedure.   Keep NPO.         Ariella Noriega NP  Date of Service: 04/10/2024  8:17 AM

## 2024-04-10 NOTE — H&P
[facility] Medicine  History & Physical    Patient Name: Anatoly Hatch III  MRN: [id number]  Patient Class: IP- Inpatient  Admission Date: 4/9/2024  Attending Physician: Ofe Olmos MD   Primary Care Provider: Bryan Brandt MD         Patient information was obtained from ER records.     Subjective:     Principal Problem:Acute coronary syndrome with high troponin    Chief Complaint:   Chief Complaint   Patient presents with    Chest Pain     CO of chest pain and SOB that started about an hour ago, denies nausea        HPI: Mr Hatch is a 58 y.o. male with PMH significant for NSTEMI in 2021 and hepatitis C vured after antiviral therapy in 2022. Patient was in his usual state of health until about noon on 4/9/2024 when he was at work, (patient is a ) at his desk doing paperwork and he suddenly began to have retrosternal chest pain which radiated to both arms. He described the pain as crushing. Patient rated the pain as 8/10 in severity and was aggravated by moving around. He decided to come to the ED to be seen.    Pt denies history of afib, denies personal hx of blood clots, He performs all ADLs independently. Pt admits to taking 3 shots of whiskey daily and admits to smoking 1 pack of cigarettes daily. He denies recreational drug use. Patient transferred for ACS. Patient will be admitted for chest pain work up.    No new subjective & objective note has been filed under this hospital service since the last note was generated.    Assessment/Plan:     * Acute coronary syndrome with high troponin  Acute Coronary Syndrome  --Started on Aspirin and Heparin drip  --Follow up PTT  --Orthostatic vital signs.  --2D echo.  --EKG.  --Serial Cardiac enzymes.  --Lipid panel  --TSH.   -- Aspirin   -- Continue Heparin drip for 48hrs  -- Cardiology consult for possible PCI/cardiac cath  -- Chest Xray       Elevated troponin I level  -- Repeat serial Troponins      Chest  pain  Pain control PRN  See ACS above        VTE Risk Mitigation (From admission, onward)           Ordered     heparin 25,000 units in dextrose 5% (100 units/ml) IV bolus from bag LOW INTENSITY nomogram - [facility]  As needed (PRN)        Question:  Heparin Infusion Adjustment (DO NOT MODIFY ANSWER)  Answer:  [email or web address]\epic\Images\Pharmacy\HeparinInfusions\heparin LOW INTENSITY nomogram for [facility] [id number]    04/09/24 1657     heparin 25,000 units in dextrose 5% (100 units/ml) IV bolus from bag LOW INTENSITY nomogram - [facility]  As needed (PRN)        Question:  Heparin Infusion Adjustment (DO NOT MODIFY ANSWER)  Answer:  \\[email or web address]\epic\Images\Pharmacy\HeparinInfusions\heparin LOW INTENSITY nomogram for [facility] [id number].pdf    04/09/24 1657     heparin 25,000 units in dextrose 5% 250 mL (100 units/mL) infusion LOW INTENSITY nomogram - [facility]  Continuous        Question:  Begin at (units/kg/hr)  Answer:  12    04/09/24 1657                  Physical Exam  orientation, language, memory, judgment, normal  Physical Exam:  GA: comfortable, no acute distress.   HEENT: No scleral icterus or JVD.   Pulmonary: Clear to auscultation A/L. No wheezing, crackles, or rhonchi.  saturating at 94-99% on room air  Cardiac: RRR S1 & S2 w/o rubs/murmurs/gallops.   Abdominal: Bowel sounds present x 4. No appreciable hepatosplenomegaly.  Skin: No jaundice, rashes.   Pulses: 1+  bilat     Neuro:  --sedation: none  --GCS: [id number]   --Mental Status:  follows commands,   --CN II-XII grossly intact. eye   --Pupils -->3 mm, PERRL.   --brainstem: intact  --Motor: full strength 5/5 bilaterally   --sensory: see motor  --Reflexes: not tested                  Juwan Myers MD  Department of Hospital Medicine  [facility]

## 2024-04-10 NOTE — SUBJECTIVE & OBJECTIVE
Past Medical History:   Diagnosis Date    Abdominal abscess 09/21/2017    Appendicitis 09/21/2017    Coronary artery disease     Hepatitis C virus infection cured after antiviral drug therapy     treated / cured (SVR12 - 11/2022)    NSTEMI (non-ST elevated myocardial infarction) 08/04/2021    Unspecified viral hepatitis C without hepatic coma        Past Surgical History:   Procedure Laterality Date    ANGIOGRAM, CORONARY, WITH LEFT HEART CATHETERIZATION N/A 8/5/2021    Procedure: Angiogram, Coronary, with Left Heart Cath;  Surgeon: Erasmo Slaughter MD;  Location: [facility] CATH/EP LAB;  Service: Cardiology;  Laterality: N/A;    APPENDECTOMY  09/21/2017    done at [facility] by Dr. Vasquez- Gama    SMALL INTESTINE SURGERY  02/25/2018    SB Resection, drjosephine abscess - Dr. Mar - [facility]       Review of patient's allergies indicates:  No Known Allergies    No current facility-administered medications on file prior to encounter.     Current Outpatient Medications on File Prior to Encounter   Medication Sig    isosorbide mononitrate (IMDUR) 30 MG 24 hr tablet Take 1 tablet (30 mg total) by mouth once daily.    metoprolol succinate (TOPROL-XL) 25 MG 24 hr tablet Take 1 tablet (25 mg total) by mouth once daily.    rosuvastatin (CRESTOR) 10 MG tablet TAKE 1 TABLET BY MOUTH EVERY DAY. TAKE INSTEAD OF 40 MG DOSE WHILE ON EPCLUSA    aspirin (ECOTRIN) 81 MG EC tablet Take 1 tablet (81 mg total) by mouth once daily.     Family History    None       Tobacco Use    Smoking status: Every Day     Current packs/day: 1.00     Average packs/day: 1 pack/day for 30.0 years (30.0 ttl pk-yrs)     Types: Cigarettes    Smokeless tobacco: Never   Substance and Sexual Activity    Alcohol use: Yes     Comment: 4 glasses of whiskey daily    Drug use: No    Sexual activity: Not on file     Review of Systems  Objective:     Vital Signs (Most Recent):  Temp: 98.3 °F (36.8 °C) (04/10/24 0340)  Pulse: 75 (04/10/24 0340)  Resp: 18 (04/10/24 0340)  BP: 119/88  "(04/10/24 0340)  SpO2: 97 % (04/10/24 0340) Vital Signs (24h Range):  Temp:  [97.7 °F (36.5 °C)-98.3 °F (36.8 °C)] 98.3 °F (36.8 °C)  Pulse:  [65-83] 75  Resp:  [18-20] 18  SpO2:  [95 %-100 %] 97 %  BP: (119-163)/() 119/88     Weight: 73.5 kg (162 lb 0.6 oz)  Body mass index is 22.6 kg/m².     Physical Exam      Physical Exam   Constitutional: He is oriented to person, place, and time. He appears well-developed and well-nourished.   HENT:   Head: Normocephalic and atraumatic.   Eyes: Pupils are equal, round, and reactive to light.   Neck: Normal range of motion. Neck supple.   Cardiovascular: Normal rate, regular rhythm and normal heart sounds.    Pulmonary/Chest: Effort normal and breath sounds normal.   Abdominal: Soft. Bowel sounds are normal.   Musculoskeletal: Normal range of motion.   Neurological: He is alert and oriented to person, place, and time.   Skin: Skin is warm and dry.   Psychiatric: He has a normal mood and affect. His behavior is normal.   Pulses: 1+ DP bilat        Significant Labs: All pertinent labs within the past 24 hours have been reviewed.  A1C: No results for input(s): "HGBA1C" in the last 4320 hours.  ABGs: No results for input(s): "PH", "PCO2", "HCO3", "POCSATURATED", "BE", "TOTALHB", "COHB", "METHB", "O2HB", "POCFIO2", "PO2" in the last 48 hours.  Bilirubin:   Recent Labs   Lab 04/09/24  1458   BILITOT 0.9     Blood Culture: No results for input(s): "LABBLOO" in the last 48 hours.  BMP:   Recent Labs   Lab 04/09/24  1458   *      K 4.0      CO2 25   BUN 18   CREATININE 1.0   CALCIUM 10.2     CBC:   Recent Labs   Lab 04/09/24  1458   WBC 11.00   HGB 15.1   HCT 45.2        CMP:   Recent Labs   Lab 04/09/24  1458      K 4.0      CO2 25   *   BUN 18   CREATININE 1.0   CALCIUM 10.2   PROT 8.3   ALBUMIN 4.7   BILITOT 0.9   ALKPHOS 68   AST 24   ALT 17   ANIONGAP 12     Cardiac Markers:   Recent Labs   Lab 04/09/24  1458   BNP 28 " "    Coagulation:   Recent Labs   Lab 04/09/24  1458 04/09/24 2238   INR 1.0  --    APTT 31.0 38.2*     Lactic Acid: No results for input(s): "LACTATE" in the last 48 hours.  Lipase: No results for input(s): "LIPASE" in the last 48 hours.  Lipid Panel: No results for input(s): "CHOL", "HDL", "LDLCALC", "TRIG", "CHOLHDL" in the last 48 hours.  Magnesium: No results for input(s): "MG" in the last 48 hours.  Pathology Results  (Last 10 years)      None          POCT Glucose: No results for input(s): "POCTGLUCOSE" in the last 48 hours.  Prealbumin: No results for input(s): "PREALBUMIN" in the last 48 hours.  Respiratory Culture: No results for input(s): "GSRESP", "RESPIRATORYC" in the last 48 hours.  Troponin:   Recent Labs   Lab 04/09/24 1458 04/09/24 1628 04/09/24 2238   TROPONINI 0.132* 0.740*  --    TROPONINIHS  --   --  1443.1*     TSH: No results for input(s): "TSH" in the last 4320 hours.  Urine Culture: No results for input(s): "LABURIN" in the last 48 hours.  Urine Studies: No results for input(s): "COLORU", "APPEARANCEUA", "PHUR", "SPECGRAV", "PROTEINUA", "GLUCUA", "KETONESU", "BILIRUBINUA", "OCCULTUA", "NITRITE", "UROBILINOGEN", "LEUKOCYTESUR", "RBCUA", "WBCUA", "BACTERIA", "SQUAMEPITHEL", "HYALINECASTS" in the last 48 hours.    Invalid input(s): "WRIGHTSUR"  Recent Lab Results         04/09/24 2238 04/09/24 1628 04/09/24  1458        Albumin     4.7       ALP     68       ALT     17       Anion Gap     12       PTT 38.2  Comment: Refer to local heparin nomogram for intensity/dose specific   therapeutic   range.       31.0  Comment: Refer to local heparin nomogram for intensity/dose specific   therapeutic   range.         AST     24       Baso #     0.07       Basophil %     0.6       BILIRUBIN TOTAL     0.9  Comment: For infants and newborns, interpretation of results should be based  on gestational age, weight and in agreement with clinical  observations.    Premature Infant recommended reference " ranges:  Up to 24 hours.............<8.0 mg/dL  Up to 48 hours............<12.0 mg/dL  3-5 days..................<15.0 mg/dL  6-29 days.................<15.0 mg/dL         BNP     28  Comment: Values of less than 100 pg/ml are consistent with non-CHF populations.       BUN     18       Calcium     10.2       Chloride     102       CO2     25       Creatinine     1.0       Differential Method     Automated       eGFR     >60       Eos #     0.2       Eos %     1.7       Glucose     148       Gran # (ANC)     6.0       Gran %     54.4       Hematocrit     45.2       Hemoglobin     15.1       Immature Grans (Abs)     0.02  Comment: Mild elevation in immature granulocytes is non specific and   can be seen in a variety of conditions including stress response,   acute inflammation, trauma and pregnancy. Correlation with other   laboratory and clinical findings is essential.         Immature Granulocytes     0.2       INR     1.0  Comment: Coumadin Therapy:  2.0 - 3.0 for INR for all indicators except mechanical heart valves  and antiphospholipid syndromes which should use 2.5 - 3.5.         Lymph #     3.9       Lymph %     35.5       MCH     31.3       MCHC     33.4       MCV     94       Mono #     0.8       Mono %     7.6       MPV     9.8       nRBC     0       Platelet Count     294       Potassium     4.0       PROTEIN TOTAL     8.3       PT     11.1       RBC     4.82       RDW     13.0       Sodium     139       Troponin I   0.740  Comment: The reference interval for Troponin I represents the 99th percentile   cutoff   for our facility and is consistent with 3rd generation assay   performance.  TROPONIN   critical result(s) called and verbal readback obtained   from NEFTALI JIMENEZ by TN3 04/09/2024 17:00     0.132  Comment: The reference interval for Troponin I represents the 99th percentile   cutoff   for our facility and is consistent with 3rd generation assay   performance.  TROPONIN   critical result(s) called  and verbal readback obtained   from NEFTALI BOO by TN3 04/09/2024 16:32         Troponin I High Sensitivity 1443.1  Comment: Troponin results differ between methods. Do not use   results between Troponin methods interchangeably.    Alkaline Phospatase levels above 400 U/L may   cause false positive results.    Access hsTnI should not be used for patients taking   Asfotase rey (Strensiq).  Critical result [facility] 1443.1 pg/mL called to and read back by Yamini Pruett RN ([facility]) at 09-Apr-2024 23:56 by [facility].  Result Rechecked             WBC     11.00               Significant Imaging: I have reviewed all relevant imaging within the past 24hrs

## 2024-04-10 NOTE — NURSING
Cath lab called and advised that Angio is cancelled for today and re scheduled for tomorrow 04/11/2024. Pt may have a diet. I advised Pt's nurse of this.

## 2024-04-10 NOTE — NURSING
Nurses Note -- 4 Eyes      4/10/2024   1:10 AM      Skin assessed during: Admit      [x] No Altered Skin Integrity Present    [x]Prevention Measures Documented      [] Yes- Altered Skin Integrity Present or Discovered   [] LDA Added if Not in Epic (Describe Wound)   [] New Altered Skin Integrity was Present on Admit and Documented in LDA   [] Wound Image Taken    Wound Care Consulted? No    Attending Nurse:  Andrew Youssef RN/Staff Member:   [id number]

## 2024-04-10 NOTE — NURSING
"0924- Pt HR-43. Pt states "feels fine." Pt denies having any chest pain. Dr. Vale notified via secure chat. Dr. Vale ordered to hold metoprolol 25 mg. Call light in reach, bed in lowest position, wheels locked, side rails up x2, and bed alarm refused. Plan of care ongoing.   "

## 2024-04-10 NOTE — H&P
[facility] Medicine  History & Physical    Patient Name: Anatoly Hatch III  MRN: [id number]  Patient Class: IP- Inpatient  Admission Date: 4/9/2024  Attending Physician: Ofe Olmos MD   Primary Care Provider: Bryan Brandt MD         Patient information was obtained from ER records.     Subjective:     Principal Problem:Acute coronary syndrome with high troponin    Chief Complaint:   Chief Complaint   Patient presents with    Chest Pain     CO of chest pain and SOB that started about an hour ago, denies nausea        HPI: Mr Hatch is a 58 y.o. male with PMH significant for NSTEMI in 2021 and hepatitis C vured after antiviral therapy in 2022. Patient was in his usual state of health until about noon on 4/9/2024 when he was at work, (patient is a ) at his desk doing paperwork and he suddenly began to have retrosternal chest pain which radiated to both arms. He described the pain as crushing. Patient rated the pain as 8/10 in severity and was aggravated by moving around. He decided to come to the ED to be seen.    Pt denies history of afib, denies personal hx of blood clots, He performs all ADLs independently. Pt admits to taking 3 shots of whiskey daily and admits to smoking 1 pack of cigarettes daily. He denies recreational drug use. Patient transferred for ACS. Patient will be admitted for chest pain work up.    Past Medical History:   Diagnosis Date    Abdominal abscess 09/21/2017    Appendicitis 09/21/2017    Coronary artery disease     Hepatitis C virus infection cured after antiviral drug therapy     treated / cured (SVR12 - 11/2022)    NSTEMI (non-ST elevated myocardial infarction) 08/04/2021    Unspecified viral hepatitis C without hepatic coma        Past Surgical History:   Procedure Laterality Date    ANGIOGRAM, CORONARY, WITH LEFT HEART CATHETERIZATION N/A 8/5/2021    Procedure: Angiogram, Coronary, with Left Heart Cath;  Surgeon: Erasmo Slaughter  MD;  Location: [facility] CATH/EP LAB;  Service: Cardiology;  Laterality: N/A;    APPENDECTOMY  09/21/2017    done at [facility] by Dr. Vasquez- Gama    SMALL INTESTINE SURGERY  02/25/2018    SB Resection, drjosephine abscess - Dr. Mar - [facility]       Review of patient's allergies indicates:  No Known Allergies    No current facility-administered medications on file prior to encounter.     Current Outpatient Medications on File Prior to Encounter   Medication Sig    isosorbide mononitrate (IMDUR) 30 MG 24 hr tablet Take 1 tablet (30 mg total) by mouth once daily.    metoprolol succinate (TOPROL-XL) 25 MG 24 hr tablet Take 1 tablet (25 mg total) by mouth once daily.    rosuvastatin (CRESTOR) 10 MG tablet TAKE 1 TABLET BY MOUTH EVERY DAY. TAKE INSTEAD OF 40 MG DOSE WHILE ON EPCLUSA    aspirin (ECOTRIN) 81 MG EC tablet Take 1 tablet (81 mg total) by mouth once daily.     Family History    None       Tobacco Use    Smoking status: Every Day     Current packs/day: 1.00     Average packs/day: 1 pack/day for 30.0 years (30.0 ttl pk-yrs)     Types: Cigarettes    Smokeless tobacco: Never   Substance and Sexual Activity    Alcohol use: Yes     Comment: 4 glasses of whiskey daily    Drug use: No    Sexual activity: Not on file     Review of Systems  Objective:     Vital Signs (Most Recent):  Temp: 98.3 °F (36.8 °C) (04/10/24 0340)  Pulse: 75 (04/10/24 0340)  Resp: 18 (04/10/24 0340)  BP: 119/88 (04/10/24 0340)  SpO2: 97 % (04/10/24 0340) Vital Signs (24h Range):  Temp:  [97.7 °F (36.5 °C)-98.3 °F (36.8 °C)] 98.3 °F (36.8 °C)  Pulse:  [65-83] 75  Resp:  [18-20] 18  SpO2:  [95 %-100 %] 97 %  BP: (119-163)/() 119/88     Weight: 73.5 kg (162 lb 0.6 oz)  Body mass index is 22.6 kg/m².     Physical Exam      Physical Exam   Constitutional: He is oriented to person, place, and time. He appears well-developed and well-nourished.   HENT:   Head: Normocephalic and atraumatic.   Eyes: Pupils are equal, round, and reactive to light.   Neck: Normal  "range of motion. Neck supple.   Cardiovascular: Normal rate, regular rhythm and normal heart sounds.    Pulmonary/Chest: Effort normal and breath sounds normal.   Abdominal: Soft. Bowel sounds are normal.   Musculoskeletal: Normal range of motion.   Neurological: He is alert and oriented to person, place, and time.   Skin: Skin is warm and dry.   Psychiatric: He has a normal mood and affect. His behavior is normal.   Pulses: 1+ DP bilat        Significant Labs: All pertinent labs within the past 24 hours have been reviewed.  A1C: No results for input(s): "HGBA1C" in the last 4320 hours.  ABGs: No results for input(s): "PH", "PCO2", "HCO3", "POCSATURATED", "BE", "TOTALHB", "COHB", "METHB", "O2HB", "POCFIO2", "PO2" in the last 48 hours.  Bilirubin:   Recent Labs   Lab 04/09/24  1458   BILITOT 0.9     Blood Culture: No results for input(s): "LABBLOO" in the last 48 hours.  BMP:   Recent Labs   Lab 04/09/24  1458   *      K 4.0      CO2 25   BUN 18   CREATININE 1.0   CALCIUM 10.2     CBC:   Recent Labs   Lab 04/09/24  1458   WBC 11.00   HGB 15.1   HCT 45.2        CMP:   Recent Labs   Lab 04/09/24  1458      K 4.0      CO2 25   *   BUN 18   CREATININE 1.0   CALCIUM 10.2   PROT 8.3   ALBUMIN 4.7   BILITOT 0.9   ALKPHOS 68   AST 24   ALT 17   ANIONGAP 12     Cardiac Markers:   Recent Labs   Lab 04/09/24  1458   BNP 28     Coagulation:   Recent Labs   Lab 04/09/24  1458 04/09/24  2238   INR 1.0  --    APTT 31.0 38.2*     Lactic Acid: No results for input(s): "LACTATE" in the last 48 hours.  Lipase: No results for input(s): "LIPASE" in the last 48 hours.  Lipid Panel: No results for input(s): "CHOL", "HDL", "LDLCALC", "TRIG", "CHOLHDL" in the last 48 hours.  Magnesium: No results for input(s): "MG" in the last 48 hours.  Pathology Results  (Last 10 years)      None          POCT Glucose: No results for input(s): "POCTGLUCOSE" in the last 48 hours.  Prealbumin: No results for " "input(s): "PREALBUMIN" in the last 48 hours.  Respiratory Culture: No results for input(s): "GSRESP", "RESPIRATORYC" in the last 48 hours.  Troponin:   Recent Labs   Lab 04/09/24  1458 04/09/24  1628 04/09/24 2238   TROPONINI 0.132* 0.740*  --    TROPONINIHS  --   --  1443.1*     TSH: No results for input(s): "TSH" in the last 4320 hours.  Urine Culture: No results for input(s): "LABURIN" in the last 48 hours.  Urine Studies: No results for input(s): "COLORU", "APPEARANCEUA", "PHUR", "SPECGRAV", "PROTEINUA", "GLUCUA", "KETONESU", "BILIRUBINUA", "OCCULTUA", "NITRITE", "UROBILINOGEN", "LEUKOCYTESUR", "RBCUA", "WBCUA", "BACTERIA", "SQUAMEPITHEL", "HYALINECASTS" in the last 48 hours.    Invalid input(s): "WRIGHTSUR"  Recent Lab Results         04/09/24 2238   04/09/24  1628   04/09/24  1458        Albumin     4.7       ALP     68       ALT     17       Anion Gap     12       PTT 38.2  Comment: Refer to local heparin nomogram for intensity/dose specific   therapeutic   range.       31.0  Comment: Refer to local heparin nomogram for intensity/dose specific   therapeutic   range.         AST     24       Baso #     0.07       Basophil %     0.6       BILIRUBIN TOTAL     0.9  Comment: For infants and newborns, interpretation of results should be based  on gestational age, weight and in agreement with clinical  observations.    Premature Infant recommended reference ranges:  Up to 24 hours.............<8.0 mg/dL  Up to 48 hours............<12.0 mg/dL  3-5 days..................<15.0 mg/dL  6-29 days.................<15.0 mg/dL         BNP     28  Comment: Values of less than 100 pg/ml are consistent with non-CHF populations.       BUN     18       Calcium     10.2       Chloride     102       CO2     25       Creatinine     1.0       Differential Method     Automated       eGFR     >60       Eos #     0.2       Eos %     1.7       Glucose     148       Gran # (ANC)     6.0       Gran %     54.4       Hematocrit     45.2    "    Hemoglobin     15.1       Immature Grans (Abs)     0.02  Comment: Mild elevation in immature granulocytes is non specific and   can be seen in a variety of conditions including stress response,   acute inflammation, trauma and pregnancy. Correlation with other   laboratory and clinical findings is essential.         Immature Granulocytes     0.2       INR     1.0  Comment: Coumadin Therapy:  2.0 - 3.0 for INR for all indicators except mechanical heart valves  and antiphospholipid syndromes which should use 2.5 - 3.5.         Lymph #     3.9       Lymph %     35.5       MCH     31.3       MCHC     33.4       MCV     94       Mono #     0.8       Mono %     7.6       MPV     9.8       nRBC     0       Platelet Count     294       Potassium     4.0       PROTEIN TOTAL     8.3       PT     11.1       RBC     4.82       RDW     13.0       Sodium     139       Troponin I   0.740  Comment: The reference interval for Troponin I represents the 99th percentile   cutoff   for our facility and is consistent with 3rd generation assay   performance.  TROPONIN   critical result(s) called and verbal readback obtained   from NEFTALI JIMENEZ by TN3 04/09/2024 17:00     0.132  Comment: The reference interval for Troponin I represents the 99th percentile   cutoff   for our facility and is consistent with 3rd generation assay   performance.  TROPONIN   critical result(s) called and verbal readback obtained   from NEFTALI BOO by TN3 04/09/2024 16:32         Troponin I High Sensitivity 1443.1  Comment: Troponin results differ between methods. Do not use   results between Troponin methods interchangeably.    Alkaline Phospatase levels above 400 U/L may   cause false positive results.    Access hsTnI should not be used for patients taking   Asfotase rey (Strensiq).  Critical result TNIHS 1443.1 pg/mL called to and read back by Yamini Pruett RN ([facility]) at 09-Apr-2024 23:56 by Darius.  Result Rechecked             WBC     11.00                Significant Imaging: I have reviewed all relevant imaging within the past 24hrs  Assessment/Plan:     * Acute coronary syndrome with high troponin  Acute Coronary Syndrome  --Started on Aspirin and Heparin drip  --Follow up PTT  --Orthostatic vital signs.  --2D echo.  --EKG.  --Serial Cardiac enzymes.  --Lipid panel  --TSH.   -- Aspirin   -- Continue Heparin drip for 48hrs  -- Cardiology consult for possible PCI/cardiac cath  -- Chest Xray       Elevated troponin I level  -- Repeat serial Troponins      Chest pain  Pain control PRN  See ACS above        VTE Risk Mitigation (From admission, onward)           Ordered     heparin 25,000 units in dextrose 5% (100 units/ml) IV bolus from bag LOW INTENSITY nomogram - [facility]  As needed (PRN)        Question:  Heparin Infusion Adjustment (DO NOT MODIFY ANSWER)  Answer:  [email or web address]\epic\Images\Pharmacy\HeparinInfusions\heparin LOW INTENSITY nomogram for [facility] [id number]    04/09/24 1657     heparin 25,000 units in dextrose 5% (100 units/ml) IV bolus from bag LOW INTENSITY nomogram - [facility]  As needed (PRN)        Question:  Heparin Infusion Adjustment (DO NOT MODIFY ANSWER)  Answer:  \\[email or web address]\epic\Images\Pharmacy\HeparinInfusions\heparin LOW INTENSITY nomogram for [facility] [id number]    04/09/24 1657     heparin 25,000 units in dextrose 5% 250 mL (100 units/mL) infusion LOW INTENSITY nomogram - [facility]  Continuous        Question:  Begin at (units/kg/hr)  Answer:  12    04/09/24 1657                                    Juwan Myers MD  Department of Hospital Medicine  [facility]

## 2024-04-10 NOTE — ASSESSMENT & PLAN NOTE
Acute Coronary Syndrome  --Started on Aspirin and Heparin drip  --Follow up PTT  --Orthostatic vital signs.  --2D echo.  --EKG.  --Serial Cardiac enzymes.  --Lipid panel  --TSH.   -- Aspirin   -- Continue Heparin drip for 48hrs  -- Cardiology consult for possible PCI/cardiac cath  -- Chest Xray

## 2024-04-10 NOTE — PROGRESS NOTES
04/10/24 1000   Tobacco Cessation Intervention   Do you use any type of tobacco product? Yes   Are you interested in quitting use of tobacco products? Thinking about quitting   Are you interested in Nicotine Replacement for symptom relief? No   $ Smoking Cessation Charges Smoking Cessation - Intermediate (Non-CTTS)   COPD Questionnaire   How often do you cough? 3   How often do you have phlegm (mucus) in your chest? 2   How often does your chest feel tight? 2   When you walk up a hill or one flight of stairs, how often are you breathless? 0   How often are you limited doing any activities at home? 0   How often are you confident leaving the house despite your lung condition? 0   How often do you sleep soundly? 3   How often do you have energy? 2   Total score 12

## 2024-04-10 NOTE — HPI
Mr Hatch is a 58 y.o. male with PMH significant for NSTEMI in 2021 and hepatitis C vured after antiviral therapy in 2022. Patient was in his usual state of health until about noon on 4/9/2024 when he was at work, (patient is a ) at his desk doing paperwork and he suddenly began to have retrosternal chest pain which radiated to both arms. He described the pain as crushing. Patient rated the pain as 8/10 in severity and was aggravated by moving around. He decided to come to the ED to be seen.    Pt denies history of afib, denies personal hx of blood clots, He performs all ADLs independently. Pt admits to taking 3 shots of whiskey daily and admits to smoking 1 pack of cigarettes daily. He denies recreational drug use. Patient transferred for ACS. Patient will be admitted for chest pain work up.

## 2024-04-10 NOTE — CONSULTS
[facility]  Department of Cardiology  Consult Note      PATIENT NAME: Anatoly Hatch III    MRN: [id number]  TODAY'S DATE: 04/10/2024  ADMIT DATE: 4/9/2024                          CONSULT REQUESTED BY: Tara Vale MD    SUBJECTIVE     PRINCIPAL PROBLEM: Acute coronary syndrome with high troponin    HPI:    Mr. Hatch is a 58 year old male who presented to the ER with complaints of chest pain which started around noon yesterday. He has a known hx of nonobstructive CAD with sluggish flow in the LAD, left circ, and RCA. He has been on Plavix at home but no on aspirin. He was noted to be hypertensive on arrival, but blood pressure has normalized. He was started on a heparin drip and transferred to [facility] from [facility]. He denies any chest pain this morning but did have a positive rise in trops overnight.       REASON FOR CONSULT:  From Hospitalist H&P: Mr Hatch is a 58 y.o. male with PMH significant for NSTEMI in 2021 and hepatitis C vured after antiviral therapy in 2022. Patient was in his usual state of health until about noon on 4/9/2024 when he was at work, (patient is a ) at his desk doing paperwork and he suddenly began to have retrosternal chest pain which radiated to both arms. He described the pain as crushing. Patient rated the pain as 8/10 in severity and was aggravated by moving around. He decided to come to the ED to be seen.     Pt denies history of afib, denies personal hx of blood clots, He performs all ADLs independently. Pt admits to taking 3 shots of whiskey daily and admits to smoking 1 pack of cigarettes daily. He denies recreational drug use. Patient transferred for ACS. Patient will be admitted for chest pain work up.      Review of patient's allergies indicates:  No Known Allergies    Past Medical History:   Diagnosis Date    Abdominal abscess 09/21/2017    Appendicitis 09/21/2017    Coronary artery disease     Hepatitis C virus infection cured after  antiviral drug therapy     treated / cured (SVR12 - 11/2022)    NSTEMI (non-ST elevated myocardial infarction) 08/04/2021    Unspecified viral hepatitis C without hepatic coma      Past Surgical History:   Procedure Laterality Date    ANGIOGRAM, CORONARY, WITH LEFT HEART CATHETERIZATION N/A 8/5/2021    Procedure: Angiogram, Coronary, with Left Heart Cath;  Surgeon: Erasmo Slaughter MD;  Location: [facility] CATH/EP LAB;  Service: Cardiology;  Laterality: N/A;    APPENDECTOMY  09/21/2017    done at [facility] by Dr. Vasquez- Gama    SMALL INTESTINE SURGERY  02/25/2018    SB Resection, drng abscess - Dr. Mar - [facility]     Social History     Tobacco Use    Smoking status: Every Day     Current packs/day: 1.00     Average packs/day: 1 pack/day for 30.0 years (30.0 ttl pk-yrs)     Types: Cigarettes    Smokeless tobacco: Never   Substance Use Topics    Alcohol use: Yes     Comment: 4 glasses of whiskey daily    Drug use: No        REVIEW OF SYSTEMS  Per HPI    OBJECTIVE     VITAL SIGNS (Most Recent)  Temp: 97.8 °F (36.6 °C) (04/10/24 0700)  Pulse: 60 (04/10/24 0700)  Resp: 18 (04/10/24 0700)  BP: 131/80 (04/10/24 0700)  SpO2: 97 % (04/10/24 0700)    VENTILATION STATUS  Resp: 18 (04/10/24 0700)  SpO2: 97 % (04/10/24 0700)           I & O (Last 24H):No intake or output data in the 24 hours ending 04/10/24 0817    WEIGHTS  Wt Readings from Last 1 Encounters:   04/09/24 2115 73.5 kg (162 lb 0.6 oz)   04/09/24 1314 72.6 kg (160 lb)       PHYSICAL EXAM  CONSTITUTIONAL: No fever, no chills  HEENT: Normocephalic, atraumatic,pupils reactive to light                 NECK:  No JVD no carotid bruit  CVS: S1S2+, RRR  LUNGS: Clear  ABDOMEN: Soft, NT, BS+  EXTREMITIES: No cyanosis, edema  : No blackwell catheter  NEURO: AAO X 3  PSY: Normal affect      HOME MEDICATIONS:  No current facility-administered medications on file prior to encounter.     Current Outpatient Medications on File Prior to Encounter   Medication Sig Dispense Refill    isosorbide  mononitrate (IMDUR) 30 MG 24 hr tablet Take 1 tablet (30 mg total) by mouth once daily. 90 tablet 3    metoprolol succinate (TOPROL-XL) 25 MG 24 hr tablet Take 1 tablet (25 mg total) by mouth once daily. 90 tablet 3    rosuvastatin (CRESTOR) 10 MG tablet TAKE 1 TABLET BY MOUTH EVERY DAY. TAKE INSTEAD OF 40 MG DOSE WHILE ON EPCLUSA (Patient taking differently: Take 10 mg by mouth once daily. TAKE 1 TABLET BY MOUTH EVERY DAY. TAKE INSTEAD OF 40 MG DOSE WHILE ON EPCLUSA) 90 tablet 3    aspirin (ECOTRIN) 81 MG EC tablet Take 1 tablet (81 mg total) by mouth once daily. 90 tablet 3    clopidogreL (PLAVIX) 75 mg tablet Take 75 mg by mouth once daily.         SCHEDULED MEDS:   sodium chloride 0.9%   Intravenous Once    aspirin  81 mg Oral Daily    atorvastatin  40 mg Oral QHS    metoprolol succinate  25 mg Oral Daily       CONTINUOUS INFUSIONS:   heparin (porcine) in D5W 14 Units/kg/hr (04/09/24 2341)       PRN MEDS:diphenhydrAMINE, heparin (PORCINE), heparin (PORCINE)    LABS AND DIAGNOSTICS     CBC LAST 3 DAYS  Recent Labs   Lab 04/09/24  1458 04/10/24  0557   WBC 11.00 8.10   RBC 4.82 4.61   HGB 15.1 14.5   HCT 45.2 43.2   MCV 94 94   MCH 31.3* 31.5*   MCHC 33.4 33.6   RDW 13.0 13.2    267   MPV 9.8 10.2   GRAN 54.4  6.0 43.7  3.5   LYMPH 35.5  3.9 42.6  3.5   MONO 7.6  0.8 7.9  0.6   BASO 0.07 0.08   NRBC 0 0       COAGULATION LAST 3 DAYS  Recent Labs   Lab 04/09/24  1458 04/09/24  2238 04/10/24  0557   INR 1.0  --   --    APTT 31.0 38.2* 41.9*       CHEMISTRY LAST 3 DAYS  Recent Labs   Lab 04/09/24  1458      K 4.0      CO2 25   ANIONGAP 12   BUN 18   CREATININE 1.0   *   CALCIUM 10.2   ALBUMIN 4.7   PROT 8.3   ALKPHOS 68   ALT 17   AST 24   BILITOT 0.9       CARDIAC PROFILE LAST 3 DAYS  Recent Labs   Lab 04/09/24  1458 04/09/24  1628 04/09/24  2238   BNP 28  --   --    TROPONINI 0.132* 0.740*  --    TROPONINIHS  --   --  1443.1*       ENDOCRINE LAST 3 DAYS  No results for input(s):  ""TSH", "PROCAL" in the last 168 hours.    LAST ARTERIAL BLOOD GAS  ABG  No results for input(s): "PH", "PO2", "PCO2", "HCO3", "BE" in the last 168 hours.    LAST 7 DAYS MICROBIOLOGY   Microbiology Results (last 7 days)       ** No results found for the last 168 hours. **            MOST RECENT IMAGING  X-Ray Chest 1 View  Narrative: EXAMINATION:  XR CHEST 1 VIEW    CLINICAL HISTORY:  Chest Pain;    TECHNIQUE:  Single frontal view of the chest was performed.    COMPARISON:  12/08/2021    FINDINGS:  The cardiomediastinal silhouette is within normal limits.  The lungs are well expanded without consolidation or pleural effusion.  Impression: Negative chest.  No significant change.    Electronically signed by: Rocky Breaux MD  Date:    04/09/2024  Time:    13:44      ECHOCARDIOGRAM RESULTS (last 5)  Results for orders placed during the hospital encounter of 09/10/21    Echo    Interpretation Summary  · The left ventricle is normal in size with concentric remodeling and normal systolic function.  · The estimated ejection fraction is 60%.  · Normal left ventricular diastolic function.  · Normal right ventricular size with normal right ventricular systolic function.      CURRENT/PREVIOUS VISIT EKG  Results for orders placed or performed during the hospital encounter of 04/09/24   EKG 12-lead    Collection Time: 04/10/24 12:10 AM   Result Value Ref Range    QRS Duration 136 ms    [facility] QTC Calculation 450 ms    Narrative    Test Reason : R07.9,    Vent. Rate : 067 BPM     Atrial Rate : 067 BPM     P-R Int : 148 ms          QRS Dur : 136 ms      QT Int : 426 ms       P-R-T Axes : 074 081 068 degrees     QTc Int : 450 ms    Normal sinus rhythm with sinus arrhythmia  Right bundle branch block  Abnormal ECG  When compared with ECG of 09-APR-2024 17:35,  No significant change was found    Referred By: [email or web address]   SELF           Confirmed By:            ASSESSMENT/PLAN:     Active Hospital Problems    Diagnosis    *Acute " coronary syndrome with high troponin    Chest pain    Elevated troponin I level       ASSESSMENT & PLAN:     ACS  NSTEMI  CAD  HTN      RECOMMENDATIONS:    Advised the patient on the recommendation for angiogram with possible PCI. Discussed with patient the risks and benefits of angiogram with PCI. Risks include but are not limited to the followin:1000 risk of heart attack, stroke, emergency CABG, and death as well as a 3-5% risk of bleeding, vessel damage, and contrast-induce nephropathy.  Alternately the option for medical management can be considered.   All questions have been answered to patient's satisfaction, and patient has voiced the desire to proceed with the procedure.   Keep NPO.         Ariella Noriega NP  Date of Service: 04/10/2024  8:17 AM

## 2024-04-10 NOTE — PLAN OF CARE
[facility]  Initial Discharge Assessment    Assessment completed at bedside with Pt and all information on FaceSheet confirmed, including demographics, PCP, pharmacy and insurance. Pt has not addressed advance directives. He currently lives by hisself in [place]. His PCP is Bryan Brandt MD, and last appointment was about 15 months ago. His preferred pharmacy is [facility]. He denies any DME/HH/HD/Blood Thinners. For transportation to appointments, he usually drives hisself and pt will drive himself home at discharge.  At discharge, to provide. He denies any recent hospitalizations. Plan for discharge is to return home. Case Management to continue to follow for discharge planning needs.      Primary Care Provider: Bryan Brandt MD    Admission Diagnosis: Chest pain [R07.9]    Admission Date: 4/9/2024  Expected Discharge Date: 4/11/2024    Transition of Care Barriers: (P) None    Payor: [employer] / Plan: [facility] SHARED SERVICES  / Product Type: Commercial /     Extended Emergency Contact Information  Primary Emergency Contact: Elizabet Hatch  Address: unknown           [address]  Mobile Phone: [phone]  Relation: Sister    Discharge Plan A: (P) Home  Discharge Plan B: (P) Home      [id number] [place] [facility] [address] AT [facility] [place] [address] & [facility]  [address] [facility] [address]  Phone: [phone] Fax: [phone]    [facility]  [address]  [facility] [address]  Phone: [phone] Fax: [phone]    [facility] - [place] - [address]  [address] [facility] [address]  [facility] [address]  Phone: [phone] Fax: [phone]    [id number] - [place] - [address] AT [facility] &   [address]  [facility] [address]  Phone: [phone] Fax: [phone]      Initial Assessment (most recent)       Adult Discharge Assessment -  04/10/24 1531          Discharge Assessment    Assessment Type Discharge Planning Assessment (P)      Confirmed/corrected address, phone number and insurance Yes (P)      Confirmed Demographics Correct on Facesheet (P)      Source of Information patient (P)      When was your last doctors appointment? -- (P)    Pt reported his last doctor's appointment was about 15 months ago.    Does patient/caregiver understand observation status Yes (P)      Reason For Admission Chest pain (P)      People in Home alone (P)      Do you expect to return to your current living situation? Yes (P)      Do you have help at home or someone to help you manage your care at home? Yes (P)      Who are your caregiver(s) and their phone number(s)? Elizabet Hatch (Sister) [phone] (Mobile) (P)      Prior to hospitilization cognitive status: Alert/Oriented (P)      Current cognitive status: Alert/Oriented (P)      Walking or Climbing Stairs Difficulty no (P)      Home Accessibility wheelchair accessible (P)      Home Layout Able to live on 1st floor (P)      Equipment Currently Used at Home none (P)      Readmission within 30 days? No (P)      Patient currently being followed by outpatient case management? No (P)      Do you currently have service(s) that help you manage your care at home? No (P)      Do you have prescription coverage? Yes (P)      Coverage [facility] SHARED SERVICE (P)      Do you have any problems affording any of your prescribed medications? No (P)      Is the patient taking medications as prescribed? yes (P)      Who is going to help you get home at discharge? Elizabet Hatch (Sister) [phone] (Mobile) (P)      How do you get to doctors appointments? car, drives self (P)      Are you on dialysis? No (P)      Do you take coumadin? No (P)      Discharge Plan A Home (P)      Discharge Plan B Home (P)      DME Needed Upon Discharge  none (P)      Discharge Plan discussed with: Patient (P)       Transition of Care Barriers None (P)

## 2024-04-11 ENCOUNTER — PATIENT MESSAGE (OUTPATIENT)
Dept: CASE MANAGEMENT | Facility: HOSPITAL | Age: 59
End: 2024-04-11

## 2024-04-11 VITALS
RESPIRATION RATE: 18 BRPM | HEIGHT: 71 IN | DIASTOLIC BLOOD PRESSURE: 90 MMHG | OXYGEN SATURATION: 100 % | SYSTOLIC BLOOD PRESSURE: 119 MMHG | HEART RATE: 64 BPM | TEMPERATURE: 99 F | BODY MASS INDEX: 22.69 KG/M2 | WEIGHT: 162.06 LBS

## 2024-04-11 LAB
APTT PPP: 45 SEC (ref 21–32)
APTT PPP: 47.1 SEC (ref 21–32)
BASOPHILS # BLD AUTO: 0.07 K/UL (ref 0–0.2)
BASOPHILS NFR BLD: 0.7 % (ref 0–1.9)
DIFFERENTIAL METHOD BLD: ABNORMAL
EOSINOPHIL # BLD AUTO: 0.4 K/UL (ref 0–0.5)
EOSINOPHIL NFR BLD: 4.6 % (ref 0–8)
ERYTHROCYTE [DISTWIDTH] IN BLOOD BY AUTOMATED COUNT: 13.2 % (ref 11.5–14.5)
HCT VFR BLD AUTO: 42.7 % (ref 40–54)
HGB BLD-MCNC: 14.4 G/DL (ref 14–18)
IMM GRANULOCYTES # BLD AUTO: 0.03 K/UL (ref 0–0.04)
IMM GRANULOCYTES NFR BLD AUTO: 0.3 % (ref 0–0.5)
LYMPHOCYTES # BLD AUTO: 4.8 K/UL (ref 1–4.8)
LYMPHOCYTES NFR BLD: 50.7 % (ref 18–48)
MCH RBC QN AUTO: 31.2 PG (ref 27–31)
MCHC RBC AUTO-ENTMCNC: 33.7 G/DL (ref 32–36)
MCV RBC AUTO: 93 FL (ref 82–98)
MONOCYTES # BLD AUTO: 0.7 K/UL (ref 0.3–1)
MONOCYTES NFR BLD: 7.9 % (ref 4–15)
NEUTROPHILS # BLD AUTO: 3.4 K/UL (ref 1.8–7.7)
NEUTROPHILS NFR BLD: 35.8 % (ref 38–73)
NRBC BLD-RTO: 0 /100 WBC
PLATELET # BLD AUTO: 278 K/UL (ref 150–450)
PMV BLD AUTO: 10.6 FL (ref 9.2–12.9)
RBC # BLD AUTO: 4.61 M/UL (ref 4.6–6.2)
WBC # BLD AUTO: 9.37 K/UL (ref 3.9–12.7)

## 2024-04-11 PROCEDURE — C1894 INTRO/SHEATH, NON-LASER: HCPCS | Performed by: INTERNAL MEDICINE

## 2024-04-11 PROCEDURE — 99152 MOD SED SAME PHYS/QHP 5/>YRS: CPT | Performed by: INTERNAL MEDICINE

## 2024-04-11 PROCEDURE — 63600175 PHARM REV CODE 636 W HCPCS: Performed by: INTERNAL MEDICINE

## 2024-04-11 PROCEDURE — 36415 COLL VENOUS BLD VENIPUNCTURE: CPT | Performed by: EMERGENCY MEDICINE

## 2024-04-11 PROCEDURE — 93458 L HRT ARTERY/VENTRICLE ANGIO: CPT | Performed by: INTERNAL MEDICINE

## 2024-04-11 PROCEDURE — C1887 CATHETER, GUIDING: HCPCS | Performed by: INTERNAL MEDICINE

## 2024-04-11 PROCEDURE — 93458 L HRT ARTERY/VENTRICLE ANGIO: CPT | Mod: 26,,, | Performed by: INTERNAL MEDICINE

## 2024-04-11 PROCEDURE — C1769 GUIDE WIRE: HCPCS | Performed by: INTERNAL MEDICINE

## 2024-04-11 PROCEDURE — B2111ZZ FLUOROSCOPY OF MULTIPLE CORONARY ARTERIES USING LOW OSMOLAR CONTRAST: ICD-10-PCS | Performed by: INTERNAL MEDICINE

## 2024-04-11 PROCEDURE — 36415 COLL VENOUS BLD VENIPUNCTURE: CPT | Performed by: FAMILY MEDICINE

## 2024-04-11 PROCEDURE — 25000003 PHARM REV CODE 250: Performed by: NURSE PRACTITIONER

## 2024-04-11 PROCEDURE — 99152 MOD SED SAME PHYS/QHP 5/>YRS: CPT | Mod: ,,, | Performed by: INTERNAL MEDICINE

## 2024-04-11 PROCEDURE — 25000003 PHARM REV CODE 250: Performed by: INTERNAL MEDICINE

## 2024-04-11 PROCEDURE — 85730 THROMBOPLASTIN TIME PARTIAL: CPT | Performed by: FAMILY MEDICINE

## 2024-04-11 PROCEDURE — 25500020 PHARM REV CODE 255: Performed by: INTERNAL MEDICINE

## 2024-04-11 PROCEDURE — 85730 THROMBOPLASTIN TIME PARTIAL: CPT | Mod: 91 | Performed by: EMERGENCY MEDICINE

## 2024-04-11 PROCEDURE — 85025 COMPLETE CBC W/AUTO DIFF WBC: CPT | Performed by: EMERGENCY MEDICINE

## 2024-04-11 PROCEDURE — 4A023N7 MEASUREMENT OF CARDIAC SAMPLING AND PRESSURE, LEFT HEART, PERCUTANEOUS APPROACH: ICD-10-PCS | Performed by: INTERNAL MEDICINE

## 2024-04-11 RX ORDER — CLOPIDOGREL BISULFATE 75 MG/1
75 TABLET ORAL DAILY
Status: DISCONTINUED | OUTPATIENT
Start: 2024-04-11 | End: 2024-04-11 | Stop reason: HOSPADM

## 2024-04-11 RX ORDER — HEPARIN SODIUM 10000 [USP'U]/ML
INJECTION, SOLUTION INTRAVENOUS; SUBCUTANEOUS
Status: DISCONTINUED | OUTPATIENT
Start: 2024-04-11 | End: 2024-04-11

## 2024-04-11 RX ORDER — AMLODIPINE BESYLATE 2.5 MG/1
2.5 TABLET ORAL DAILY
Status: DISCONTINUED | OUTPATIENT
Start: 2024-04-11 | End: 2024-04-11

## 2024-04-11 RX ORDER — MICONAZOLE NITRATE 2 %
2 CREAM (GRAM) TOPICAL
Qty: 30 EACH | Refills: 3 | Status: SHIPPED | OUTPATIENT
Start: 2024-04-11

## 2024-04-11 RX ORDER — AMLODIPINE BESYLATE 2.5 MG/1
2.5 TABLET ORAL DAILY
Qty: 90 TABLET | Refills: 3 | Status: SHIPPED | OUTPATIENT
Start: 2024-04-12 | End: 2025-04-12

## 2024-04-11 RX ORDER — LIDOCAINE HYDROCHLORIDE 10 MG/ML
INJECTION, SOLUTION EPIDURAL; INFILTRATION; INTRACAUDAL; PERINEURAL
Status: DISCONTINUED | OUTPATIENT
Start: 2024-04-11 | End: 2024-04-11

## 2024-04-11 RX ORDER — AMLODIPINE BESYLATE 2.5 MG/1
2.5 TABLET ORAL DAILY
Status: DISCONTINUED | OUTPATIENT
Start: 2024-04-11 | End: 2024-04-11 | Stop reason: HOSPADM

## 2024-04-11 RX ORDER — FENTANYL CITRATE 50 UG/ML
INJECTION, SOLUTION INTRAMUSCULAR; INTRAVENOUS
Status: DISCONTINUED | OUTPATIENT
Start: 2024-04-11 | End: 2024-04-11

## 2024-04-11 RX ORDER — NITROGLYCERIN 5 MG/ML
INJECTION, SOLUTION INTRAVENOUS
Status: DISCONTINUED | OUTPATIENT
Start: 2024-04-11 | End: 2024-04-11

## 2024-04-11 RX ORDER — MIDAZOLAM HYDROCHLORIDE 1 MG/ML
INJECTION INTRAMUSCULAR; INTRAVENOUS
Status: DISCONTINUED | OUTPATIENT
Start: 2024-04-11 | End: 2024-04-11

## 2024-04-11 RX ADMIN — AMLODIPINE BESYLATE 2.5 MG: 2.5 TABLET ORAL at 01:04

## 2024-04-11 RX ADMIN — DIPHENHYDRAMINE HYDROCHLORIDE 50 MG: 25 CAPSULE ORAL at 07:04

## 2024-04-11 RX ADMIN — ASPIRIN 81 MG: 81 TABLET, COATED ORAL at 07:04

## 2024-04-11 RX ADMIN — CLOPIDOGREL BISULFATE 75 MG: 75 TABLET, FILM COATED ORAL at 01:04

## 2024-04-11 NOTE — HOSPITAL COURSE
Pt admitted and started on hepain drip. Trop at 0.7. Echo nonrevealing.   Pt taken for angiogram:    The pre-procedure left ventricular end diastolic pressure was 6.    Mild disease of RCA, nonobstructive    Moderate disease of left circumflex and OM around 40%, nonobstructive    Mild luminal irregularities in LAD.  Sluggish flow consistent with endothelial dysfunction.     Routine post-cath care.   Maximize medical management.   Risk factor reductions.   ASA 81mg.   Plavix for one year.   Tobacco cessation counseling.   Cardiac rehab referral.   Statin therapy.      Unsure of the etiology of patient's constellation of symptoms. MINOCA possibly due to spasms. Will treat with amlodipine. Outpatient monitor and follow-up in the clinic.     Deemed stable to be d/c.

## 2024-04-11 NOTE — DISCHARGE SUMMARY
[facility] Medicine  Discharge Summary      Patient Name: Anatoly Hatch III  MRN: [id number]  LUDIN: [id number]  Patient Class: IP- Inpatient  Admission Date: 4/9/2024  Hospital Length of Stay: 2 days  Discharge Date and Time:  04/11/2024 3:25 PM  Attending Physician: Tara Vale MD   Discharging Provider: Tara Vale MD  Primary Care Provider: Bryan Brandt MD    Primary Care Team: Networked reference to record PCT     HPI:   Mr Hatch is a 58 y.o. male with PMH significant for NSTEMI in 2021 and hepatitis C vured after antiviral therapy in 2022. Patient was in his usual state of health until about noon on 4/9/2024 when he was at work, (patient is a ) at his desk doing paperwork and he suddenly began to have retrosternal chest pain which radiated to both arms. He described the pain as crushing. Patient rated the pain as 8/10 in severity and was aggravated by moving around. He decided to come to the ED to be seen.    Pt denies history of afib, denies personal hx of blood clots, He performs all ADLs independently. Pt admits to taking 3 shots of whiskey daily and admits to smoking 1 pack of cigarettes daily. He denies recreational drug use. Patient transferred for ACS. Patient will be admitted for chest pain work up.    Procedure(s) (LRB):  Left heart cath (Left)      Hospital Course:   Pt admitted and started on hepain drip. Trop at 0.7. Echo nonrevealing.   Pt taken for angiogram:    The pre-procedure left ventricular end diastolic pressure was 6.    Mild disease of RCA, nonobstructive    Moderate disease of left circumflex and OM around 40%, nonobstructive    Mild luminal irregularities in LAD.  Sluggish flow consistent with endothelial dysfunction.     Routine post-cath care.   Maximize medical management.   Risk factor reductions.   ASA 81mg.   Plavix for one year.   Tobacco cessation counseling.   Cardiac rehab referral.   Statin  therapy.      Unsure of the etiology of patient's constellation of symptoms. MINOCA possibly due to spasms. Will treat with amlodipine. Outpatient monitor and follow-up in the clinic.     Deemed stable to be d/c.      Goals of Care Treatment Preferences:  Code Status: Full Code      Consults:     Cardiac/Vascular  * Acute coronary syndrome with high troponin  Acute Coronary Syndrome  --Started on Aspirin and Heparin drip   Pt taken for angiogram:    The pre-procedure left ventricular end diastolic pressure was 6.    Mild disease of RCA, nonobstructive    Moderate disease of left circumflex and OM around 40%, nonobstructive    Mild luminal irregularities in LAD.  Sluggish flow consistent with endothelial dysfunction.     Routine post-cath care.   Maximize medical management.   Risk factor reductions.   ASA 81mg.   Plavix for one year.   Tobacco cessation counseling.   Cardiac rehab referral.   Statin therapy.      Unsure of the etiology of patient's constellation of symptoms. MINOCA possibly due to spasms. Will treat with amlodipine. Outpatient monitor and follow-up in the clinic.     Deemed stable to be d/c.        Elevated troponin I level  -- Repeat serial Troponins      Chest pain  Pain control PRN  See ACS above        Final Active Diagnoses:    Diagnosis Date Noted POA    PRINCIPAL PROBLEM:  Acute coronary syndrome with high troponin [I24.9] 04/10/2024 Yes    Chest pain [R07.9] 04/10/2024 Yes    Elevated troponin I level [R79.89] 04/10/2024 Yes      Problems Resolved During this Admission:       Discharged Condition: stable    Disposition: Home or Self Care    Follow Up:   Follow-up Information       Guanako Garner MD Follow up.    Specialties: Interventional Cardiology, Cardiology  Why: As needed  Contact information:  [address] [facility]  [address]  [place] [address]  [phone]               Bryan Brandt MD Follow up.    Specialty: Family Medicine  Why: As needed  Contact information:  [address]  [place] [address]  [phone]                           Patient Instructions:      Ambulatory referral/consult to Smoking Cessation Program   Standing Status: Future   Referral Priority: Routine Referral Type: Consultation   Referral Reason: Specialty Services Required   Requested Specialty: CTTS   Number of Visits Requested: 1     Diet Adult Regular     Notify your health care provider if you experience any of the following:  temperature >100.4     Notify your health care provider if you experience any of the following:  persistent nausea and vomiting or diarrhea     Notify your health care provider if you experience any of the following:  severe uncontrolled pain     Activity as tolerated       Significant Diagnostic Studies: Labs: BMP: No results for input(s): "GLU", "NA", "K", "CL", "CO2", "BUN", "CREATININE", "CALCIUM", "MG" in the last 48 hours. and CBC   Recent Labs   Lab 04/10/24  0557 04/11/24  0400   WBC 8.10 9.37   HGB 14.5 14.4   HCT 43.2 42.7    278       Pending Diagnostic Studies:       None           Medications:  Reconciled Home Medications:      Medication List        START taking these medications      amLODIPine 2.5 MG tablet  Commonly known as: NORVASC  Take 1 tablet (2.5 mg total) by mouth once daily.  Start taking on: April 12, 2024     nicotine (polacrilex) 2 mg Gum  Commonly known as: NICORETTE  Take 1 each (2 mg total) by mouth every hour as needed (smoking cessation).            CHANGE how you take these medications      rosuvastatin 10 MG tablet  Commonly known as: CRESTOR  TAKE 1 TABLET BY MOUTH EVERY DAY. TAKE INSTEAD OF 40 MG DOSE WHILE ON EPCLUSA  What changed:   how much to take  how to take this  when to take this            CONTINUE taking these medications      aspirin 81 MG EC tablet  Commonly known as: ECOTRIN  Take 1 tablet (81 mg total) by mouth once daily.     clopidogreL 75 mg tablet  Commonly known as: PLAVIX  Take 75 mg by mouth once daily.     isosorbide " mononitrate 30 MG 24 hr tablet  Commonly known as: IMDUR  Take 1 tablet (30 mg total) by mouth once daily.            STOP taking these medications      metoprolol succinate 25 MG 24 hr tablet  Commonly known as: TOPROL-XL              Indwelling Lines/Drains at time of discharge:   Lines/Drains/Airways       None                   Time spent on the discharge of patient: >30 minutes         Tara Vale MD  Department of Hospital Medicine  [facility]

## 2024-04-11 NOTE — INTERVAL H&P NOTE
The patient has been examined and the H&P has been reviewed:    I concur with the findings and no changes have occurred since H&P was written.    Procedure risks, benefits and alternative options discussed and understood by patient/family.          Active Hospital Problems    Diagnosis  POA    *Acute coronary syndrome with high troponin [I24.9]  Yes    Chest pain [R07.9]  Yes    Elevated troponin I level [R79.89]  Yes      Resolved Hospital Problems   No resolved problems to display.

## 2024-04-11 NOTE — PLAN OF CARE
Nursing Transfer Note    Telephone report called to TREASURE Lomas at 1026.  Bedside handoff to TREASURE Lomas at 1059.    Transfer To: 2519    Transfer From: [facility]    Transfer via stretcher    Transfer with cardiac monitoring    Transported by NEFTALI Alcantara    Chart send with patient: Yes    Upon arrival to floor: cardiac monitor applied, patient oriented to room, call bell in reach, and bed in lowest position

## 2024-04-11 NOTE — PLAN OF CARE
Problem: Bleeding (Cardiovascular Surgery)  Goal: Bleeding (Cardiovascular Surgery)  Outcome: Ongoing, Progressing     Problem: Adjustment to Surgery (Cardiovascular Surgery)  Goal: Optimal Coping with Heart Surgery  Outcome: Ongoing, Progressing     Problem: Activity Intolerance (Cardiovascular Surgery)  Goal: Improved Activity Tolerance  Outcome: Ongoing, Progressing     Problem: Fall Injury Risk  Goal: Absence of Fall and Fall-Related Injury  Outcome: Ongoing, Progressing     Problem: Adult Inpatient Plan of Care  Goal: Readiness for Transition of Care  Outcome: Ongoing, Progressing

## 2024-04-11 NOTE — NURSING
Discharge instructions given to patient. Patient verbalized understanding of follow up appointments and medications. Bilateral PIV's removed without difficulty, catheter tips intact. Tele removed and returned to monitor room. Patient to discharge home via private vehicle.

## 2024-04-11 NOTE — PLAN OF CARE
Report received from cath lab RN. Arrived from cath lab via stretcher. No complaints of pain or distress noted. TR Band to right radial CDI with 12ml of air per cath lab report . Post angio instructions given. Resting in bed with call light in reach.

## 2024-04-11 NOTE — ASSESSMENT & PLAN NOTE
Acute Coronary Syndrome  --Started on Aspirin and Heparin drip   Pt taken for angiogram:    The pre-procedure left ventricular end diastolic pressure was 6.    Mild disease of RCA, nonobstructive    Moderate disease of left circumflex and OM around 40%, nonobstructive    Mild luminal irregularities in LAD.  Sluggish flow consistent with endothelial dysfunction.     Routine post-cath care.   Maximize medical management.   Risk factor reductions.   ASA 81mg.   Plavix for one year.   Tobacco cessation counseling.   Cardiac rehab referral.   Statin therapy.      Unsure of the etiology of patient's constellation of symptoms. MINOCA possibly due to spasms. Will treat with amlodipine. Outpatient monitor and follow-up in the clinic.     Deemed stable to be d/c.

## 2024-04-11 NOTE — PLAN OF CARE
Pt cleared for discharge from case management     04/11/24 7554   Final Note   Assessment Type Discharge Planning Assessment   Anticipated Discharge Disposition Home   What phone number can be called within the next 1-3 days to see how you are doing after discharge?   ([phone])

## 2024-04-12 LAB
OHS QRS DURATION: 134 MS
OHS QRS DURATION: 138 MS
OHS QTC CALCULATION: 443 MS
OHS QTC CALCULATION: 459 MS

## 2024-04-15 ENCOUNTER — PATIENT OUTREACH (OUTPATIENT)
Dept: ADMINISTRATIVE | Facility: CLINIC | Age: 59
End: 2024-04-15
Payer: COMMERCIAL

## 2024-04-15 NOTE — PROGRESS NOTES
C3 nurse attempted to contact Anatoly Hatch III for a TCC post hospital discharge follow up call. The patient requested a callback tomorrow at 1600 to review his medications when he has them close by.     The pt has a HOSFU with [facility] on 4/18/24 at 0900. No messages routed at this time.

## 2024-04-16 ENCOUNTER — TELEPHONE (OUTPATIENT)
Dept: CARDIOLOGY | Facility: CLINIC | Age: 59
End: 2024-04-16
Payer: COMMERCIAL

## 2024-04-16 NOTE — TELEPHONE ENCOUNTER
----- Message from Linnea Kelley RN sent at 4/16/2024  4:36 PM CDT -----  Regarding: medication question  Good afternoon,    I am a discharge nurse who just got off the phone with this pt. Aspirin 81mg daily is still on his med list, but he states he can't remember if you told him to stop taking it or not. Also, metoprolol 25mg daily is not on his Epic med list, but he states he currently takes it daily.    If you or one of your staff members wouldn't mind calling Anatoly Ayo Hatch III to clarify whether or not he should be taking these two meds, I know he would greatly appreciate it.     Thank you for your time,     Linnea Kelley RN  Transitional Care Coordinator

## 2024-04-16 NOTE — PROGRESS NOTES
C3 nurse spoke with Anatoly Hatch III for a TCC post hospital discharge follow up call. Pt denies any new symptoms but has a med question. Aspirin 81mg daily is currently on his AVS to continue taking, but he states he wasn't sure if his cardiac NP, Ariella Noriega told him to stop taking it or not. He also states he takes 25mg metoprolol daily, though it is not on his med list. He verbalized understanding to call Ariella Noriega NP to clarify whether he should be taking these two medications. C3 nurse also sent a direct message to her staff with above question.    He confirmed understanding of where to locate the OOC number to call with any new or worsening symptoms.    The patient has a HOSFU with [facility] on 4/18/24 at 0900, though he states he will likely cancel the appt since he doesn't feel like he needs to be seen for a HOSFU. He denies needing a HOSFU appt with his PCP, Bryan Brandt MD either, though he confirms he has Bryan Brandt MD's number to call if needed. Message routed to Bryan Brandt MD.

## 2024-04-29 LAB
OHS QRS DURATION: 136 MS
OHS QTC CALCULATION: 450 MS

## 2024-07-09 ENCOUNTER — PATIENT MESSAGE (OUTPATIENT)
Dept: ADMINISTRATIVE | Facility: HOSPITAL | Age: 59
End: 2024-07-09
Payer: COMMERCIAL

## 2024-08-08 ENCOUNTER — TELEPHONE (OUTPATIENT)
Dept: FAMILY MEDICINE | Facility: CLINIC | Age: 59
End: 2024-08-08
Payer: COMMERCIAL

## 2025-01-17 ENCOUNTER — OCCUPATIONAL HEALTH (OUTPATIENT)
Dept: URGENT CARE | Facility: CLINIC | Age: 60
End: 2025-01-17

## 2025-01-17 DIAGNOSIS — Z02.83 ENCOUNTER FOR DRUG SCREENING: Primary | ICD-10-CM

## 2025-01-17 PROCEDURE — 80305 DRUG TEST PRSMV DIR OPT OBS: CPT | Mod: S$GLB,,, | Performed by: EMERGENCY MEDICINE

## (undated) DEVICE — UNDERGLOVES BIOGEL PI SIZE 8

## (undated) DEVICE — APPLICATOR CHLORAPREP ORN 26ML

## (undated) DEVICE — K-WIRE TRCR PT1.25MM D 150MM L
Type: IMPLANTABLE DEVICE | Site: WRIST | Status: NON-FUNCTIONAL
Removed: 2017-03-24

## (undated) DEVICE — BIT DRILL 2.0.

## (undated) DEVICE — HEMOSTAT VASC BAND LONG 27CM

## (undated) DEVICE — PADDING CAST 4IN(OR)

## (undated) DEVICE — ADHESIVE DERMABOND ADVANCED

## (undated) DEVICE — BANDAGE ACE NON LATEX 4IN

## (undated) DEVICE — DRESSING XEROFORM 1X8IN

## (undated) DEVICE — STRAP OR TABLE 5IN X 72IN

## (undated) DEVICE — SHEATH PINNACLE 6FRX10CM W/GUIDEWIRE

## (undated) DEVICE — CATHETER GUIDE LAUNCHER JL4 SH 6FX110

## (undated) DEVICE — SUT MONOCRYL 4-0 PS-2

## (undated) DEVICE — GUIDEWIRE STEERABLE COUGAR XT .014X190CM

## (undated) DEVICE — SHEET DRAPE MEDIUM

## (undated) DEVICE — SUT ETHILON 3-0 PS2 18 BLK

## (undated) DEVICE — DRAPE STERI-DRAPE 1000 17X11IN

## (undated) DEVICE — ELECTRODE REM PLYHSV RETURN 9

## (undated) DEVICE — PACK ARTHROSCOPY W/ISO BAC

## (undated) DEVICE — LINER SUCTION 3000CC

## (undated) DEVICE — CATHETER DIAGNOSTIC DXTERITY 5FR JR4.0

## (undated) DEVICE — CATH DXTERITY JR40 100CM 5FR

## (undated) DEVICE — GLOVE SURG ULTRA TOUCH 8

## (undated) DEVICE — KIT INFLATION MERIT (IN8152, HP9200E)

## (undated) DEVICE — CATH DXTERITY JL35 100CM 5FR

## (undated) DEVICE — PADDING CAST  4X4 YD NON ST

## (undated) DEVICE — SLEEVE SCD EXPRESS CALF MEDIUM

## (undated) DEVICE — VALVE BLEEDBACK CONTROL 1003330

## (undated) DEVICE — DRESSING XEROFORM FOIL PK 1X8

## (undated) DEVICE — PACK CUSTOM UNIV BASIN SLI

## (undated) DEVICE — GAUZE SPONGE BULKEE 6X6.75IN

## (undated) DEVICE — PADDING CAST 4IN SPECIALIST

## (undated) DEVICE — DRAPE STERI U-SHAPED 47X51IN

## (undated) DEVICE — CATHETER GUIDE LAUNCHER JL4 6FX110CM

## (undated) DEVICE — TOURNIQUET SB QC DP 18X4IN

## (undated) DEVICE — CATHETER DIAGNOSTIC DXTERITY 5F PIGST

## (undated) DEVICE — BLADE SURG #15 CARBON STEEL

## (undated) DEVICE — SOL 9P NACL IRR PIC IL

## (undated) DEVICE — KIT GLIDESHEATH SLEND 6FR 10CM

## (undated) DEVICE — GUIDEWIRE DOUBLE ENDED .035 DIA. 150CML

## (undated) DEVICE — SEE MEDLINE ITEM 146270

## (undated) DEVICE — BIT DRILL QC 1.8X110MM

## (undated) DEVICE — GUIDEWIRE INQWIRE SE 3MM JTIP

## (undated) DEVICE — SHEATH PINNACLE 5FRX10CM W/GUIDEWIRE

## (undated) DEVICE — SEE MEDLINE ITEM 157117

## (undated) DEVICE — DRAPE C-ARM FOR MOBILE XRAY

## (undated) DEVICE — SPLINT PLASTER F.S 4INX15IN

## (undated) DEVICE — SEE MEDLINE ITEM 157131

## (undated) DEVICE — CATHETER DIAGNOSTIC DXTERITY 5FR JL4.0

## (undated) DEVICE — SUT CTD VICRYL BR CR/SH VIL

## (undated) DEVICE — GLOVE SURG ULTRA TOUCH 7.5

## (undated) DEVICE — BANDAGE ESMARK LATEX FREE 4INX